# Patient Record
Sex: FEMALE | Race: WHITE | Employment: UNEMPLOYED | URBAN - METROPOLITAN AREA
[De-identification: names, ages, dates, MRNs, and addresses within clinical notes are randomized per-mention and may not be internally consistent; named-entity substitution may affect disease eponyms.]

---

## 2020-08-13 ENCOUNTER — OFFICE VISIT (OUTPATIENT)
Dept: CARDIOLOGY CLINIC | Age: 50
End: 2020-08-13
Payer: COMMERCIAL

## 2020-08-13 VITALS
HEART RATE: 60 BPM | RESPIRATION RATE: 18 BRPM | BODY MASS INDEX: 32.49 KG/M2 | DIASTOLIC BLOOD PRESSURE: 80 MMHG | WEIGHT: 207 LBS | HEIGHT: 67 IN | OXYGEN SATURATION: 98 % | SYSTOLIC BLOOD PRESSURE: 120 MMHG

## 2020-08-13 DIAGNOSIS — R06.83 SNORING: ICD-10-CM

## 2020-08-13 DIAGNOSIS — I34.1 MITRAL VALVE PROLAPSE: Primary | ICD-10-CM

## 2020-08-13 PROCEDURE — 93000 ELECTROCARDIOGRAM COMPLETE: CPT | Performed by: SPECIALIST

## 2020-08-13 PROCEDURE — 99203 OFFICE O/P NEW LOW 30 MIN: CPT | Performed by: SPECIALIST

## 2020-08-13 RX ORDER — GALCANEZUMAB 120 MG/ML
INJECTION, SOLUTION SUBCUTANEOUS
COMMUNITY
Start: 2020-07-17 | End: 2022-03-24 | Stop reason: SDUPTHER

## 2020-08-13 RX ORDER — DULOXETIN HYDROCHLORIDE 30 MG/1
CAPSULE, DELAYED RELEASE ORAL
COMMUNITY
Start: 2020-07-04 | End: 2020-08-18 | Stop reason: SDUPTHER

## 2020-08-13 RX ORDER — METOPROLOL SUCCINATE 25 MG/1
TABLET, EXTENDED RELEASE ORAL
COMMUNITY
Start: 2020-06-30 | End: 2021-03-11

## 2020-08-13 RX ORDER — MONTELUKAST SODIUM 10 MG/1
10 TABLET ORAL DAILY
COMMUNITY
End: 2021-04-27

## 2020-08-13 RX ORDER — TIZANIDINE 4 MG/1
TABLET ORAL
COMMUNITY
Start: 2020-05-24 | End: 2021-12-06 | Stop reason: SDUPTHER

## 2020-08-13 RX ORDER — OLMESARTAN MEDOXOMIL AND HYDROCHLOROTHIAZIDE 40/25 40; 25 MG/1; MG/1
TABLET ORAL
COMMUNITY
Start: 2020-07-14 | End: 2020-09-08 | Stop reason: SDUPTHER

## 2020-08-13 NOTE — PROGRESS NOTES
[facility]                                                            OFFICE NOTE        Darwin Camejo M.D.,BRODIE RUBI LINCOLN   1970  [id number]    Date/Time:  8/13/202011:35 AM        ICD-10-CM ICD-9-CM    1. Mitral valve prolapse  I34.1 424.0 AMB POC EKG ROUTINE W/ 12 LEADS, INTER & REP         SUBJECTIVE:  She does have palpitations 3-4 times a week lasting few minutes  She snores loudly  No cp or sob reported       Assessment/Plan  1. Palpitations: Her electrocardiogram today reveals a normal sinus rhythm with normal intervals. She has no documentation of complex dysrhythmias in the past.  Continue Toprol and magnesium and night. We have discussed reduction in caffeine intake. We have also discussed the presence of potentially significant sleep apnea which again could have some bearings on her palpitations and hypertension. In the meanwhile before committing her to a loop monitor she does have a hand-held device for arrhythmias have asked her to use that and send me the report. 2.  Hypertension: Seems well controlled today no changes in medications at this time. 3.  Hyperlipidemia: Closely followed by her primary care physician. 4.  Loud snoring: Likely sleep apnea. Proceed with sleep evaluation. Otherwise I will see her back in 6 months or sooner if any questions or problems arise. HPI   Very pleasant 45years old lady with a past medical history remarkable for hyperlipidemia, hypertension, depression anxiety, migraine headaches, mitral valve prolapse palpitation who has just relocated for [place] and she is establishing cardiology follow-up. She was followed by cardiologist for palpitation and shortness of breath and mitral valve prolapse. To be noted that she has undergone a calcium score in November 2019 which was 0.   She reports to me a transthoracic echocardiogram in December 2019 with normal ejection fraction mitral valve prolapse. Past surgical history: D&C, appendectomy. Social history: She drinks occasionally. She does not smoke. Family history: History of aortic valve replacement in her father. Hyperlipidemia in her family was also noted. CARDIAC STUDIES                                       EKG Results     Procedure 720 Value Units Date/Time    AMB POC EKG ROUTINE W/ 12 LEADS, INTER & REP [[id number]]     Order Status:  Sent               IMAGING      MRI Results (most recent):  No results found for this or any previous visit. CT Results (most recent):  No results found for this or any previous visit. XR Results (most recent):  No results found for this or any previous visit. No past medical history on file. No past surgical history on file. Social History     Tobacco Use    Smoking status: Never Smoker    Smokeless tobacco: Never Used   Substance Use Topics    Alcohol use: Yes    Drug use: Never     No family history on file. Allergies   Allergen Reactions    Bactrim [Sulfamethoprim] Hives         Visit Vitals  /80 (BP 1 Location: Left arm, BP Patient Position: Sitting)   Pulse 69   Resp 18   Ht 5' 7\" (1.702 m)   Wt 207 lb (93.9 kg)   SpO2 98%   BMI 32.42 kg/m²         Last 3 Recorded Weights in this Encounter    08/13/20 1123   Weight: 207 lb (93.9 kg)            Review of Systems:   Pertinent items are noted in the History of Present Illness. Visit Vitals  /80 (BP 1 Location: Left arm, BP Patient Position: Sitting)   Pulse 60   Resp 18   Ht 5' 7\" (1.702 m)   Wt 207 lb (93.9 kg)   SpO2 98%   BMI 32.42 kg/m²     General Appearance:  Well developed, well nourished,alert and oriented x 3, and individual in no acute distress. Ears/Nose/Mouth/Throat:   Hearing grossly normal.         Neck: Supple. Chest:   Lungs clear to auscultation bilaterally.    Cardiovascular:  Regular rate and rhythm, S1, S2 normal, no murmur. Abdomen:   Soft, non-tender, bowel sounds are active. Extremities: No edema bilaterally. Skin: Warm and dry. Current Outpatient Medications on File Prior to Visit   Medication Sig Dispense Refill    DULoxetine (CYMBALTA) 30 mg capsule TAKE 2 CAPSULES BY MOUTH EVERY DAY      olmesartan-hydroCHLOROthiazide (BENICAR HCT) 40-25 mg per tablet TAKE 1 TABLET BY MOUTH EVERY DAY      metoprolol succinate (TOPROL-XL) 25 mg XL tablet TAKE 1 TABLET BY MOUTH EVERY DAY      Emgality Pen 120 mg/mL injection INJECT 1ML SUBCUTANEOUSLY EVERY MONTH      tiZANidine (ZANAFLEX) 4 mg tablet TAKE 1 2 TABLETS BY MOUTH AT BEDTIME      montelukast (Singulair) 10 mg tablet Take 10 mg by mouth daily. No current facility-administered medications on file prior to visit. Jonny Chinchilla had no medications administered during this visit. Current Outpatient Medications   Medication Sig    DULoxetine (CYMBALTA) 30 mg capsule TAKE 2 CAPSULES BY MOUTH EVERY DAY    olmesartan-hydroCHLOROthiazide (BENICAR HCT) 40-25 mg per tablet TAKE 1 TABLET BY MOUTH EVERY DAY    metoprolol succinate (TOPROL-XL) 25 mg XL tablet TAKE 1 TABLET BY MOUTH EVERY DAY    Emgality Pen 120 mg/mL injection INJECT 1ML SUBCUTANEOUSLY EVERY MONTH    tiZANidine (ZANAFLEX) 4 mg tablet TAKE 1 2 TABLETS BY MOUTH AT BEDTIME    montelukast (Singulair) 10 mg tablet Take 10 mg by mouth daily. No current facility-administered medications for this visit.           No results found for: CHOL, CHOLPOCT, CHOLX, CHLST, CHOLV, HDL, HDLPOC, HDLP, LDL, LDLCPOC, LDLC, DLDLP, VLDLC, VLDL, TGLX, TRIGL, TRIGP, TGLPOCT, CHHD, CHHDX    No results found for: NA, K, CL, CO2, AGAP, GLU, BUN, CREA, BUCR, GFRAA, GFRNA, CA, GFRAA    No results found for: ALT, GGT, GGTP, AP, APIT, APX, CBIL, TBIL, TBILI    No results found for: WBC, WBCLT, HGBPOC, HGB, HGBP, HCTPOC, HCT, PHCT, RBCH, PLT, MCV, HGBEXT, HCTEXT, PLTEXT    No results found for: TSH, TSH2, TSH3, TSHP, TSHEXT      No results found for: CHOL, CHOLPOCT, CHOLX, CHLST, CHOLV, HDL, HDLP, LDL, LDLC, DLDLP, TGLX, TRIGL, TRIGP, TGLPOCT, NTGLT, CHHD, CHHDX             Please note that this dictation was completed with [employer], the computer voice recognition software. Quite often unanticipated grammatical, syntax, homophones, and other interpretative errors are inadvertently transcribed by the computer software. Please disregard these errors. Please excuse any errors that have escaped final proofreading.

## 2020-08-13 NOTE — PATIENT INSTRUCTIONS
A Referral has been sent to Dr. Meera Llanos. If you are not contacted with 10 days, please call her office at [phone]. Follow up with Dr. Anabell Mcduffie in 6 months.

## 2020-08-13 NOTE — PROGRESS NOTES
Visit Vitals  /80 (BP 1 Location: Left arm, BP Patient Position: Sitting)   Pulse 60   Resp 18   Ht 5' 7\" (1.702 m)   Wt 207 lb (93.9 kg)   SpO2 98%   BMI 32.42 kg/m²

## 2020-08-17 NOTE — PROGRESS NOTES
Jc Clarke is a 45 y.o. female who was seen by synchronous (real-time) audio-video technology on 8/18/2020 for Establish Care        Assessment & Plan:     Diagnoses and all orders for this visit:    1. Chronic migraine without aura without status migrainosus, not intractable  -     acetaminophen-caff-dihydrocod (Trezix) 320.5-30-16 mg cap; Take 1 Cap by mouth as needed for Pain (Do not exceed one tab twice weekly.) for up to 30 days. Max Daily Amount: 1 Cap.  checked  Pt will transfer migraine care to Neurology in October when she establishes with provider here in [place]. 2. Anxiety and depression  -     DULoxetine (CYMBALTA) 60 mg capsule; TAKE 1 CAPSULE BY MOUTH EVERY DAY (60mg)   Well controlled, tolerating medication, continue current regimen. 3. Other secondary hypertension  -     METABOLIC PANEL, COMPREHENSIVE; Future  -     CBC WITH AUTOMATED DIFF; Future  Follows with Cardiology, BP well controlled. 4. Mitral valve prolapse  Follows with Cardiology    5. Hypercholesteremia  -     LIPID PANEL; Future    6. Ear pain, bilateral  -     REFERRAL TO ENT-OTOLARYNGOLOGY  Pt has failed treatment for otitis media and eustachian tube dysfunction- recommend in person eval by ENT for further evaluation    7. Screening for breast cancer  -     LORRAINE MAMMO BI SCREENING INCL CAD; Future    8. Screen for colon cancer  -     COLOGUARD TEST (FECAL DNA COLORECTAL CANCER SCREENING)      Follow-up and Dispositions    · Return in about 3 months (around 11/18/2020), or if symptoms worsen or fail to improve, for HTN, migraines, XOL, depression/anxiety. Subjective:       Pt here to est care. Prev PCP Dr. Jessica Polo, [facility].  Moved here in March from [place].        Pt has hx of:  HTN- told secondary HTN by Cardiology, olmesartan/hctz 40-25, metoprolol 25mg, home bp 120/80   XOL- took statin in past (last fall) and made her too nauseous  Depression, anxiety- Cymbalta 60mg  Migraines-emgality sq monthly injection, zomig prn, Trezix, previously following with Neurologist in [place], gets 10-12 migraines a month. Had neck surgery in 2010 for neck which is when migraines worsend, told has bone spurs and stenosis, saw spine specialist in [place] and told not great surgical candidate. Tracks migraines with an fab. Has appt with Neurologist in October. Takes Trezix 2/week, zomig 2/week. Mitral valve prolapse- Seen by Dr. Martin Butterfield on 8-13-20, EKG NSR and BP well controlled at visit, pt advised continue toprol and decrease caffeine, and also referred for sleep study for snoring- states had trouble in past getting sleep study approved by insurance- was $2400. Has been two UC twice since moved here in March for ear problems- once told ear infection and given abx, 2nd time told eustachian tube dysfunction and prescribed flonase and afrin. Was better initially but now having ear stopped up feeling, right ear painful, feels like fluid in ears, will have drainage and crust in ears, ears popping. Has been using flonase daily and singulair. HM  Shingles- never had   PAP- Dr. Yamila Wilson office in last few years, no hx of abnormal PAP  Mammogram- last one in [place] approx 2 years ago  FOBT- never had colonoscopy       Lifestyle  Diet: Eats Mediterranean diet. Eats some fast foods, rare fried foods. Drinks sprite occasionally, cran-grape juice, OJ. Uses to drink a lot of sweet tea. Exercise: Was going to gym pre-covid, just opened back up. [place]. ETOH: 2-3 glasses/week  Nicotine: never      Denies chest pain, chest pressure, or palpitations. Denies SOB, orthopnea, or PND. Denies lower extremity swelling. Has hx of vertigo, denies blurred vision. Denies N/V/D, constipation, or abd pain. Denies BRBPR, melena, or blood in urine.      3 most recent PHQ Screens 8/18/2020   Little interest or pleasure in doing things Not at all   Feeling down, depressed, irritable, or hopeless Not at all   Total Score PHQ 2 0 Prior to Admission medications    Medication Sig Start Date End Date Taking? Authorizing Provider   magnesium 250 mg tab Take 500 mg by mouth. Yes Provider, Historical   ZOLMitriptan (Zomig ZMT) 5 mg disintegrating tablet Take 5 mg by mouth as needed for Migraine. Yes Provider, Historical   DULoxetine (CYMBALTA) 60 mg capsule TAKE 1 CAPSULE BY MOUTH EVERY DAY (60mg) 8/18/20  Yes Micheal Cost, NP   acetaminophen-caff-dihydrocod (Trezix) 320.5-30-16 mg cap Take 1 Cap by mouth as needed for Pain (Do not exceed one tab twice weekly.) for up to 30 days. Max Daily Amount: 1 Cap. 8/18/20 9/17/20 Yes Micheal Melgoza, NP   olmesartan-hydroCHLOROthiazide (BENICAR HCT) 40-25 mg per tablet TAKE 1 TABLET BY MOUTH EVERY DAY 7/14/20  Yes Provider, Historical   metoprolol succinate (TOPROL-XL) 25 mg XL tablet TAKE 1 TABLET BY MOUTH EVERY DAY 6/30/20  Yes Provider, Historical   Emgality Pen 120 mg/mL injection INJECT 1ML SUBCUTANEOUSLY EVERY MONTH 7/17/20  Yes Provider, Historical   tiZANidine (ZANAFLEX) 4 mg tablet TAKE 1 2 TABLETS BY MOUTH AT BEDTIME 5/24/20  Yes Provider, Historical   montelukast (Singulair) 10 mg tablet Take 10 mg by mouth daily. Yes Provider, Historical   DULoxetine (CYMBALTA) 30 mg capsule TAKE 2 CAPSULES BY MOUTH EVERY DAY 7/4/20 8/18/20  Provider, Historical     Patient Active Problem List   Diagnosis Code    Migraines G43.909    HTN (hypertension) I10    Anxiety and depression F41.9, F32.9    Mitral valve prolapse I34.1    Hypercholesteremia E78.00     Patient Active Problem List    Diagnosis Date Noted    Mitral valve prolapse 08/18/2020    Hypercholesteremia 08/18/2020    Migraines     HTN (hypertension)     Anxiety and depression      Current Outpatient Medications   Medication Sig Dispense Refill    magnesium 250 mg tab Take 500 mg by mouth.  ZOLMitriptan (Zomig ZMT) 5 mg disintegrating tablet Take 5 mg by mouth as needed for Migraine.       DULoxetine (CYMBALTA) 60 mg capsule TAKE 1 CAPSULE BY MOUTH EVERY DAY (60mg) 90 Cap 2    acetaminophen-caff-dihydrocod (Trezix) 320.5-30-16 mg cap Take 1 Cap by mouth as needed for Pain (Do not exceed one tab twice weekly.) for up to 30 days. Max Daily Amount: 1 Cap. 30 Cap 0    olmesartan-hydroCHLOROthiazide (BENICAR HCT) 40-25 mg per tablet TAKE 1 TABLET BY MOUTH EVERY DAY      metoprolol succinate (TOPROL-XL) 25 mg XL tablet TAKE 1 TABLET BY MOUTH EVERY DAY      Emgality Pen 120 mg/mL injection INJECT 1ML SUBCUTANEOUSLY EVERY MONTH      tiZANidine (ZANAFLEX) 4 mg tablet TAKE 1 2 TABLETS BY MOUTH AT BEDTIME      montelukast (Singulair) 10 mg tablet Take 10 mg by mouth daily. Allergies   Allergen Reactions    Latex Hives, Itching and Other (comments)     Swelling at contact areas    Bactrim [Sulfamethoprim] Hives     Past Medical History:   Diagnosis Date    Anxiety and depression     HTN (hypertension)     Migraines      Past Surgical History:   Procedure Laterality Date    HX  SECTION      HX DILATION AND CURETTAGE      HX ORTHOPAEDIC  2010    titanium conchita, cervical spine     Family History   Problem Relation Age of Onset    Stroke Mother     Cancer Mother         brain tumor    Heart Disease Father     Prostate Cancer Father     Hypertension Sister     Heart Attack Sister     Alzheimer Maternal Grandmother     Heart Attack Maternal Grandmother     Hypertension Maternal Grandmother     Lung Cancer Paternal Grandmother     Other Paternal Grandfather         cirrhosis      Social History     Tobacco Use    Smoking status: Never Smoker    Smokeless tobacco: Never Used   Substance Use Topics    Alcohol use: Yes     Comment: 2-3 glasses of wine/month       ROS  See hpi    Objective:   No flowsheet data found.      [INSTRUCTIONS:  \"[x]\" Indicates a positive item  \"[]\" Indicates a negative item  -- DELETE ALL ITEMS NOT EXAMINED]    Constitutional: [x] Appears well-developed and well-nourished [x] No apparent distress      [] Abnormal -     Mental status: [x] Alert and awake  [x] Oriented to person/place/time [x] Able to follow commands    [] Abnormal -     Eyes:   EOM    [x]  Normal    [] Abnormal -   Sclera  [x]  Normal    [] Abnormal -          Discharge [x]  None visible   [] Abnormal -     HENT: [x] Normocephalic, atraumatic  [] Abnormal -   [x] Mouth/Throat: Mucous membranes are moist    External Ears [x] Normal  [] Abnormal -    Neck: [x] No visualized mass [] Abnormal -     Pulmonary/Chest: [x] Respiratory effort normal   [x] No visualized signs of difficulty breathing or respiratory distress        [] Abnormal -      Musculoskeletal:   [x] Normal gait with no signs of ataxia         [x] Normal range of motion of neck        [] Abnormal -     Neurological:        [x] No Facial Asymmetry (Cranial nerve 7 motor function) (limited exam due to video visit)          [x] No gaze palsy        [] Abnormal -          Skin:        [x] No significant exanthematous lesions or discoloration noted on facial skin         [] Abnormal -            Psychiatric:       [x] Normal Affect [] Abnormal -        [x] No Hallucinations    Other pertinent observable physical exam findings:-        We discussed the expected course, resolution and complications of the diagnosis(es) in detail. Medication risks, benefits, costs, interactions, and alternatives were discussed as indicated. I advised her to contact the office if her condition worsens, changes or fails to improve as anticipated. She expressed understanding with the diagnosis(es) and plan. Paul Osman, who was evaluated through a patient-initiated, synchronous (real-time) audio-video encounter, and/or her healthcare decision maker, is aware that it is a billable service, with coverage as determined by her insurance carrier. She provided verbal consent to proceed: Yes, and patient identification was verified.  It was conducted pursuant to the emergency declaration under the [facility], [address] [place] [address] authority and the [employer] and [employer] Act. A caregiver was present when appropriate. Ability to conduct physical exam was limited. I was at home. The patient was at home.       Francisco Diamond NP

## 2020-08-18 ENCOUNTER — VIRTUAL VISIT (OUTPATIENT)
Dept: INTERNAL MEDICINE CLINIC | Age: 50
End: 2020-08-18
Payer: COMMERCIAL

## 2020-08-18 DIAGNOSIS — Z12.39 SCREENING FOR BREAST CANCER: ICD-10-CM

## 2020-08-18 DIAGNOSIS — G43.709 CHRONIC MIGRAINE WITHOUT AURA WITHOUT STATUS MIGRAINOSUS, NOT INTRACTABLE: Primary | ICD-10-CM

## 2020-08-18 DIAGNOSIS — E78.00 HYPERCHOLESTEREMIA: ICD-10-CM

## 2020-08-18 DIAGNOSIS — F41.9 ANXIETY AND DEPRESSION: ICD-10-CM

## 2020-08-18 DIAGNOSIS — H92.03 EAR PAIN, BILATERAL: ICD-10-CM

## 2020-08-18 DIAGNOSIS — Z12.11 SCREEN FOR COLON CANCER: ICD-10-CM

## 2020-08-18 DIAGNOSIS — I15.8 OTHER SECONDARY HYPERTENSION: ICD-10-CM

## 2020-08-18 DIAGNOSIS — F32.A ANXIETY AND DEPRESSION: ICD-10-CM

## 2020-08-18 DIAGNOSIS — I34.1 MITRAL VALVE PROLAPSE: ICD-10-CM

## 2020-08-18 PROCEDURE — 99204 OFFICE O/P NEW MOD 45 MIN: CPT | Performed by: NURSE PRACTITIONER

## 2020-08-18 RX ORDER — ZINC GLUCONATE 10 MG
500 LOZENGE ORAL
COMMUNITY

## 2020-08-18 RX ORDER — ACETAMINOPHEN, CAFFEINE, DIHYDROCODEINE BITARTRATE 320.5; 30; 16 MG/1; MG/1; MG/1
1 CAPSULE ORAL AS NEEDED
Qty: 30 CAP | Refills: 0 | Status: SHIPPED | OUTPATIENT
Start: 2020-08-18 | End: 2020-09-30

## 2020-08-18 RX ORDER — DULOXETIN HYDROCHLORIDE 60 MG/1
CAPSULE, DELAYED RELEASE ORAL
Qty: 90 CAP | Refills: 2 | Status: SHIPPED | OUTPATIENT
Start: 2020-08-18 | End: 2020-11-04 | Stop reason: SDUPTHER

## 2020-08-18 RX ORDER — ZOLMITRIPTAN 5 MG/1
5 TABLET, ORALLY DISINTEGRATING ORAL AS NEEDED
COMMUNITY
End: 2021-04-27

## 2020-08-18 NOTE — PATIENT INSTRUCTIONS
High Blood Pressure: Care Instructions Overview It's normal for blood pressure to go up and down throughout the day. But if it stays up, you have high blood pressure. Another name for high blood pressure is hypertension. Despite what a lot of people think, high blood pressure usually doesn't cause headaches or make you feel dizzy or lightheaded. It usually has no symptoms. But it does increase your risk of stroke, heart attack, and other problems. You and your doctor will talk about your risks of these problems based on your blood pressure. Your doctor will give you a goal for your blood pressure. Your goal will be based on your health and your age. Lifestyle changes, such as eating healthy and being active, are always important to help lower blood pressure. You might also take medicine to reach your blood pressure goal. 
Follow-up care is a key part of your treatment and safety. Be sure to make and go to all appointments, and call your doctor if you are having problems. It's also a good idea to know your test results and keep a list of the medicines you take. How can you care for yourself at home? Medical treatment · If you stop taking your medicine, your blood pressure will go back up. You may take one or more types of medicine to lower your blood pressure. Be safe with medicines. Take your medicine exactly as prescribed. Call your doctor if you think you are having a problem with your medicine. · Talk to your doctor before you start taking aspirin every day. Aspirin can help certain people lower their risk of a heart attack or stroke. But taking aspirin isn't right for everyone, because it can cause serious bleeding. · See your doctor regularly. You may need to see the doctor more often at first or until your blood pressure comes down. · If you are taking blood pressure medicine, talk to your doctor before you take decongestants or anti-inflammatory medicine, such as ibuprofen. Some of these medicines can raise blood pressure. · Learn how to check your blood pressure at home. Lifestyle changes · Stay at a healthy weight. This is especially important if you put on weight around the waist. Losing even 10 pounds can help you lower your blood pressure. · If your doctor recommends it, get more exercise. Walking is a good choice. Bit by bit, increase the amount you walk every day. Try for at least 30 minutes on most days of the week. You also may want to swim, bike, or do other activities. · Avoid or limit alcohol. Talk to your doctor about whether you can drink any alcohol. · Try to limit how much sodium you eat to less than 2,300 milligrams (mg) a day. Your doctor may ask you to try to eat less than 1,500 mg a day. · Eat plenty of fruits (such as bananas and oranges), vegetables, legumes, whole grains, and low-fat dairy products. · Lower the amount of saturated fat in your diet. Saturated fat is found in animal products such as milk, cheese, and meat. Limiting these foods may help you lose weight and also lower your risk for heart disease. · Do not smoke. Smoking increases your risk for heart attack and stroke. If you need help quitting, talk to your doctor about stop-smoking programs and medicines. These can increase your chances of quitting for good. When should you call for help? Call  911 anytime you think you may need emergency care. This may mean having symptoms that suggest that your blood pressure is causing a serious heart or blood vessel problem. Your blood pressure may be over 180/120. For example, call 911 if: 
· You have symptoms of a heart attack. These may include: 
? Chest pain or pressure, or a strange feeling in the chest. 
? Sweating. ? Shortness of breath. ? Nausea or vomiting. ? Pain, pressure, or a strange feeling in the back, neck, jaw, or upper belly or in one or both shoulders or arms. ? Lightheadedness or sudden weakness. ? A fast or irregular heartbeat. · You have symptoms of a stroke. These may include: 
? Sudden numbness, tingling, weakness, or loss of movement in your face, arm, or leg, especially on only one side of your body. ? Sudden vision changes. ? Sudden trouble speaking. ? Sudden confusion or trouble understanding simple statements. ? Sudden problems with walking or balance. ? A sudden, severe headache that is different from past headaches. · You have severe back or belly pain. Do not wait until your blood pressure comes down on its own. Get help right away. Call your doctor now or seek immediate care if: 
· Your blood pressure is much higher than normal (such as 180/120 or higher), but you don't have symptoms. · You think high blood pressure is causing symptoms, such as: 
? Severe headache. 
? Blurry vision. Watch closely for changes in your health, and be sure to contact your doctor if: 
· Your blood pressure measures higher than your doctor recommends at least 2 times. That means the top number is higher or the bottom number is higher, or both. · You think you may be having side effects from your blood pressure medicine. Where can you learn more? Go to http://bkaari-brendan.info/ Enter Z048 in the search box to learn more about \"High Blood Pressure: Care Instructions. \" Current as of: December 16, 2019               Content Version: 12.5 © 7861-6600 [employer]. Care instructions adapted under license by [employer] (which disclaims liability or warranty for this information). If you have questions about a medical condition or this instruction, always ask your healthcare professional. [place] [address] any warranty or liability for your use of this information.

## 2020-08-18 NOTE — PROGRESS NOTES
Appointment made for patient with Dr Margarita Pacheco for right ear pain/drainage on 8/20/2020 at 9:20 am Office location [id number] [facility].

## 2020-08-18 NOTE — PROGRESS NOTES
Rusty Barrera  Identified pt with two pt identifiers(name and ). Chief Complaint   Patient presents with   24 [facility] Establish Care     Has chronic neck pain from bone spur-prior surgery pain rated at a 5/10    1. Have you been to the ER, urgent care clinic since your last visit? Hospitalized since your last visit? NO    2. Have you seen or consulted any other health care providers outside of the [address] [place] [address] since your last visit? Include any pap smears or colon screening. Cardiologist-Dr Karina Ruth  Sees neuro in Oct for migraines -    Today's provider has been notified of reason for visit, vitals and flowsheets obtained on patients. Reviewed record In preparation for visit, huddled with provider and have obtained necessary documentation      Health Maintenance Due   Topic    DTaP/Tdap/Td series (1 - Tdap)    PAP AKA CERVICAL CYTOLOGY     Lipid Screen     Shingrix Vaccine Age 50> (1 of 2)    Breast Cancer Screen Mammogram     FOBT Q1Y Age 54-65     Influenza Age 5 to Adult        Wt Readings from Last 3 Encounters:   20 207 lb (93.9 kg)     Temp Readings from Last 3 Encounters:   No data found for Temp     BP Readings from Last 3 Encounters:   20 120/80     Pulse Readings from Last 3 Encounters:   20 60     There were no vitals filed for this visit. Learning Assessment:  :     No flowsheet data found. Depression Screening:  :     3 most recent PHQ Screens 2020   Little interest or pleasure in doing things Not at all   Feeling down, depressed, irritable, or hopeless Not at all   Total Score PHQ 2 0       Fall Risk Assessment:  :     Fall Risk Assessment, last 12 mths 2020   Able to walk? Yes   Fall in past 12 months? No       Abuse Screening:  :     Abuse Screening Questionnaire 2020   Do you ever feel afraid of your partner? N   Are you in a relationship with someone who physically or mentally threatens you? N   Is it safe for you to go home?  Y       ADL Screening:  :     ADL Assessment 8/18/2020   Feeding yourself No Help Needed   Getting from bed to chair No Help Needed   Getting dressed No Help Needed   Bathing or showering No Help Needed   Walk across the room (includes cane/walker) No Help Needed   Using the telphone No Help Needed   Taking your medications No Help Needed   Preparing meals No Help Needed   Managing money (expenses/bills) No Help Needed   Moderately strenuous housework (laundry) No Help Needed   Shopping for personal items (toiletries/medicines) No Help Needed   Shopping for groceries No Help Needed   Driving No Help Needed   Climbing a flight of stairs No Help Needed   Getting to places beyond walking distances No Help Needed                 Medication reconciliation up to date and corrected with patient at this time.

## 2020-09-08 RX ORDER — OLMESARTAN MEDOXOMIL AND HYDROCHLOROTHIAZIDE 40/25 40; 25 MG/1; MG/1
TABLET ORAL
Qty: 90 TAB | Refills: 1 | Status: SHIPPED | OUTPATIENT
Start: 2020-09-08 | End: 2020-09-28

## 2020-09-08 NOTE — TELEPHONE ENCOUNTER
Requested Prescriptions     Signed Prescriptions Disp Refills    olmesartan-hydroCHLOROthiazide (BENICAR HCT) 40-25 mg per tablet 90 Tab 1     Sig: TAKE 1 TABLET BY MOUTH EVERY DAY     Authorizing Provider: Oliverio Chavez     Ordering User: Caitlyn Triana     Verbal order per Dr. Heavenly Siegel.     Future Appointments   Date Time Provider Namrata Singh   11/19/2020  8:30 AM GIULIANO Penny AMB   2/16/2021 11:00 AM MD MIGUEL ANGEL Franz AMB

## 2020-09-23 ENCOUNTER — TELEPHONE (OUTPATIENT)
Dept: INTERNAL MEDICINE CLINIC | Age: 50
End: 2020-09-23

## 2020-09-23 DIAGNOSIS — R19.5 POSITIVE COLORECTAL CANCER SCREENING USING COLOGUARD TEST: Primary | ICD-10-CM

## 2020-09-23 NOTE — TELEPHONE ENCOUNTER
I left a message to call back. If I do not receive a call by tomorrow, I will send a letter with the results and referral information.

## 2020-09-23 NOTE — LETTER
9/24/2020 2:58 PM 
 
Ms. Pola Maldonado [address] [facility] [address] We received a positive cologuard result via fax. I would like for you to see a gastroenterologists for further evaluation. If you have any questions, please give me a call at [phone]. Sincerely, Luma Lewis NP

## 2020-09-24 NOTE — TELEPHONE ENCOUNTER
I called the patient and verified them by name and date of birth. I informed the patient on the message from Rima Mitchell NP. She stated understanding and had no further questions. She would like for me to send the referral information via [employer].

## 2020-09-24 NOTE — TELEPHONE ENCOUNTER
The patient called back and verified them by name and date of birth. I informed the patient on the results from Colog[facility]ard.  The patient stated they started their period later that day and wanted to know if that could be a result of the positive test.

## 2020-09-28 ENCOUNTER — TELEPHONE (OUTPATIENT)
Dept: CARDIOLOGY CLINIC | Age: 50
End: 2020-09-28

## 2020-09-28 RX ORDER — OLMESARTAN MEDOXOMIL 40 MG/1
TABLET ORAL DAILY
COMMUNITY
End: 2021-02-08 | Stop reason: SDUPTHER

## 2020-09-28 RX ORDER — HYDROCHLOROTHIAZIDE 25 MG/1
25 TABLET ORAL DAILY
COMMUNITY
End: 2021-02-08 | Stop reason: SDUPTHER

## 2020-09-28 NOTE — TELEPHONE ENCOUNTER
Received notification from pharmacy that benicar HCT is backordered. Spoke to pharmacist, Lawanda Cyr. To provide separate tablets, olmesartan 40 mg daily and HCTZ 25 mg daily. Medication profile updated.

## 2020-09-29 DIAGNOSIS — G43.709 CHRONIC MIGRAINE WITHOUT AURA WITHOUT STATUS MIGRAINOSUS, NOT INTRACTABLE: ICD-10-CM

## 2020-09-30 RX ORDER — ACETAMINOPHEN, CAFFEINE, DIHYDROCODEINE BITARTRATE 320.5; 30; 16 MG/1; MG/1; MG/1
CAPSULE ORAL
Qty: 30 CAP | Refills: 0 | Status: SHIPPED | OUTPATIENT
Start: 2020-09-30 | End: 2020-10-30

## 2020-11-04 DIAGNOSIS — G43.709 CHRONIC MIGRAINE WITHOUT AURA WITHOUT STATUS MIGRAINOSUS, NOT INTRACTABLE: ICD-10-CM

## 2020-11-05 RX ORDER — ACETAMINOPHEN, CAFFEINE, DIHYDROCODEINE BITARTRATE 320.5; 30; 16 MG/1; MG/1; MG/1
CAPSULE ORAL
Qty: 30 CAP | Refills: 0 | OUTPATIENT
Start: 2020-11-05

## 2020-11-05 NOTE — TELEPHONE ENCOUNTER
Verified patients name and date of birth. Patient given information per Deysi Olivarez NP. Patient stated understanding.

## 2020-11-05 NOTE — TELEPHONE ENCOUNTER
Verified patients name and date of birth. Advised her per BAILEY Hui note. Patient states she has >15 migraines monthly and has seen a neurologist at [facility]. Neurologist is Dr Mimi Lazar. Advised he will likely need to fill this medication now. Please advise.

## 2021-02-08 RX ORDER — OLMESARTAN MEDOXOMIL AND HYDROCHLOROTHIAZIDE 40/25 40; 25 MG/1; MG/1
TABLET ORAL
Qty: 90 TAB | Refills: 1 | Status: SHIPPED | OUTPATIENT
Start: 2021-02-08 | End: 2021-08-16

## 2021-02-08 NOTE — TELEPHONE ENCOUNTER
Cardiologist: Dr. Srdihar Kimball    Last appt: 8/13/2020  Future Appointments   Date Time Provider Namrata Kingi   2/16/2021 11:00 AM MD MIGUEL ANGEL Perez BS AMB       Requested Prescriptions     Signed Prescriptions Disp Refills    olmesartan-hydroCHLOROthiazide (BENICAR HCT) 40-25 mg per tablet 90 Tab 1     Sig: TAKE 1 TABLET BY MOUTH EVERY DAY     Authorizing Provider: Kassandra Higgins     Ordering User: TUNDE HOOVER         Refills VO per Dr. Sridhar Kimball.     Changed back to combination tab

## 2021-03-11 ENCOUNTER — TELEPHONE (OUTPATIENT)
Dept: CARDIOLOGY CLINIC | Age: 51
End: 2021-03-11

## 2021-03-11 ENCOUNTER — OFFICE VISIT (OUTPATIENT)
Dept: CARDIOLOGY CLINIC | Age: 51
End: 2021-03-11
Payer: COMMERCIAL

## 2021-03-11 VITALS
RESPIRATION RATE: 18 BRPM | SYSTOLIC BLOOD PRESSURE: 130 MMHG | HEART RATE: 97 BPM | DIASTOLIC BLOOD PRESSURE: 90 MMHG | WEIGHT: 212 LBS | BODY MASS INDEX: 33.27 KG/M2 | HEIGHT: 67 IN | OXYGEN SATURATION: 97 %

## 2021-03-11 DIAGNOSIS — I34.1 MITRAL VALVE PROLAPSE: Primary | ICD-10-CM

## 2021-03-11 DIAGNOSIS — R00.2 PALPITATIONS: ICD-10-CM

## 2021-03-11 DIAGNOSIS — I15.8 OTHER SECONDARY HYPERTENSION: ICD-10-CM

## 2021-03-11 PROCEDURE — 99214 OFFICE O/P EST MOD 30 MIN: CPT | Performed by: SPECIALIST

## 2021-03-11 PROCEDURE — 93000 ELECTROCARDIOGRAM COMPLETE: CPT | Performed by: SPECIALIST

## 2021-03-11 RX ORDER — RIMEGEPANT SULFATE 75 MG/75MG
75 TABLET, ORALLY DISINTEGRATING ORAL AS NEEDED
COMMUNITY
Start: 2021-01-30 | End: 2021-10-01 | Stop reason: SDUPTHER

## 2021-03-11 RX ORDER — METOPROLOL SUCCINATE 50 MG/1
50 TABLET, EXTENDED RELEASE ORAL DAILY
Qty: 90 TAB | Refills: 1 | Status: SHIPPED | OUTPATIENT
Start: 2021-03-11 | End: 2021-04-27 | Stop reason: SDUPTHER

## 2021-03-11 NOTE — TELEPHONE ENCOUNTER
Enrolled with [employer] - Ordered and being shipped to patient's home address on file. ETA within 5-7 business days.

## 2021-03-11 NOTE — PROGRESS NOTES
.  Visit Vitals  BP (!) 130/90 (BP 1 Location: Left upper arm, BP Patient Position: Sitting, BP Cuff Size: Adult)   Pulse 97   Resp 18   Ht 5' 7\" (1.702 m)   Wt 212 lb (96.2 kg)   SpO2 97%   BMI 33.20 kg/m²

## 2021-03-11 NOTE — PROGRESS NOTES
[address] [facility]                                                            OFFICE NOTE        Luis Fernando Mcgraw M.D.,BRODIE RUBI LINCOLN   1970  [id number]    Date/Time:  3/11/135604:18 AM        ICD-10-CM ICD-9-CM    1. Mitral valve prolapse  I34.1 424.0 AMB POC EKG ROUTINE W/ 12 LEADS, INTER & REP         SUBJECTIVE:  She has noticed palpitations increased HR 4-5 times a week   no cp or sob reported  Still snoring loudly       Assessment/Plan  1. Palpitations: She continues to have palpitations about both her Apple Watch and other hand-held device not picking up any kind of rhythm issues thus far but she tells me that the heart rate for no reason will go up to 120 bpm.    Discussed options. Proceed with 2 weeks loop monitor. Obtain BMP magnesium and TSH. Proceed with sleep study. 2.  Hypertension: Slightly hypertensive today also on account of palpitation I will increase the Toprol to 50 mg daily. She is aware of potential side effects. 3.  Hyperlipidemia: Closely followed by primary care physician. 4.  Loud snoring: Likely sleep apnea. Proceed with sleep evaluation. Otherwise I will see her back in approximately 3 weeks. HPI   Very pleasant 45years old lady with a past medical history remarkable for hyperlipidemia, hypertension, depression anxiety, migraine headaches, mitral valve prolapse palpitation who has just relocated for [place] and she is establishing cardiology follow-up.     She was followed by cardiologist for palpitation and shortness of breath and mitral valve prolapse. To be noted that she has undergone a calcium score in November 2019 which was 0. She reports to me a transthoracic echocardiogram in December 2019 with normal ejection fraction mitral valve prolapse.     Past surgical history: D&C, appendectomy.     Social history: She drinks occasionally. She does not smoke.     Family history: History of aortic valve replacement in her father. Hyperlipidemia in her family was also noted. CARDIAC STUDIES                                       EKG Results     Procedure 720 Value Units Date/Time    AMB POC EKG ROUTINE W/ 12 LEADS, INTER & REP [[id number]] Resulted: 21 1059    Order Status: Completed Updated: 21 1101              IMAGING      MRI Results (most recent):  No results found for this or any previous visit. CT Results (most recent):  Results from Abstract encounter on 20   CT CARDIAC SCORE LTD       XR Results (most recent):  No results found for this or any previous visit.         Past Medical History:   Diagnosis Date    Anxiety and depression     HTN (hypertension)     Migraines      Past Surgical History:   Procedure Laterality Date    HX  SECTION      HX DILATION AND CURETTAGE      HX ORTHOPAEDIC  2010    titanium conchita, cervical spine     Social History     Tobacco Use    Smoking status: Never Smoker    Smokeless tobacco: Never Used   Substance Use Topics    Alcohol use: Yes     Comment: 2-3 glasses of wine/month    Drug use: Never     Family History   Problem Relation Age of Onset   Jenny Collins Stroke Mother     Cancer Mother         brain tumor    Heart Disease Father     Prostate Cancer Father     Hypertension Sister     Heart Attack Sister     Alzheimer Maternal Grandmother     Heart Attack Maternal Grandmother     Hypertension Maternal Grandmother     Lung Cancer Paternal Grandmother     Other Paternal Grandfather         cirrhosis      Allergies   Allergen Reactions    Latex Hives, Itching and Other (comments)     Swelling at contact areas    Pb Swelling    Bactrim [Sulfamethoprim] Hives         Visit Vitals  BP (!) 130/90 (BP 1 Location: Left upper arm, BP Patient Position: Sitting, BP Cuff Size: Adult)   Pulse 97   Resp 18   Ht 5' 7\" (1.702 m)   Wt 212 lb (96.2 kg)   SpO2 97%   BMI 33.20 kg/m²         Last 3 Recorded Weights in this Encounter    03/11/21 1053   Weight: 212 lb (96.2 kg)            Review of Systems:   Pertinent items are noted in the History of Present Illness. Visit Vitals  BP (!) 130/90 (BP 1 Location: Left upper arm, BP Patient Position: Sitting, BP Cuff Size: Adult)   Pulse 97   Resp 18   Ht 5' 7\" (1.702 m)   Wt 212 lb (96.2 kg)   SpO2 97%   BMI 33.20 kg/m²     General Appearance:  Well developed, well nourished,alert and oriented x 3, and individual in no acute distress. Ears/Nose/Mouth/Throat:   Hearing grossly normal.         Neck: Supple. Chest:   Lungs clear to auscultation bilaterally. Cardiovascular:  Regular rate and rhythm, S1, S2 normal, no murmur. Abdomen:   Soft, non-tender, bowel sounds are active. Extremities: No edema bilaterally. Skin: Warm and dry. Current Outpatient Medications on File Prior to Visit   Medication Sig Dispense Refill    Nurtec ODT 75 mg disintegrating tablet Take 75 mg by mouth daily.  olmesartan-hydroCHLOROthiazide (BENICAR HCT) 40-25 mg per tablet TAKE 1 TABLET BY MOUTH EVERY DAY 90 Tab 1    DULoxetine (CYMBALTA) 60 mg capsule TAKE 1 CAPSULE BY MOUTH EVERY DAY (60mg) 90 Cap 2    magnesium 250 mg tab Take 500 mg by mouth.  metoprolol succinate (TOPROL-XL) 25 mg XL tablet TAKE 1 TABLET BY MOUTH EVERY DAY      Emgality Pen 120 mg/mL injection INJECT 1ML SUBCUTANEOUSLY EVERY MONTH      tiZANidine (ZANAFLEX) 4 mg tablet TAKE 1 2 TABLETS BY MOUTH AT BEDTIME      ZOLMitriptan (Zomig ZMT) 5 mg disintegrating tablet Take 5 mg by mouth as needed for Migraine.  montelukast (Singulair) 10 mg tablet Take 10 mg by mouth daily. No current facility-administered medications on file prior to visit. Patrick Tejeda had no medications administered during this visit. Current Outpatient Medications   Medication Sig    Nurtec ODT 75 mg disintegrating tablet Take 75 mg by mouth daily.     olmesartan-hydroCHLOROthiazide (BENICAR HCT) 40-25 mg per tablet TAKE 1 TABLET BY MOUTH EVERY DAY    DULoxetine (CYMBALTA) 60 mg capsule TAKE 1 CAPSULE BY MOUTH EVERY DAY (60mg)    magnesium 250 mg tab Take 500 mg by mouth.  metoprolol succinate (TOPROL-XL) 25 mg XL tablet TAKE 1 TABLET BY MOUTH EVERY DAY    Emgality Pen 120 mg/mL injection INJECT 1ML SUBCUTANEOUSLY EVERY MONTH    tiZANidine (ZANAFLEX) 4 mg tablet TAKE 1 2 TABLETS BY MOUTH AT BEDTIME    ZOLMitriptan (Zomig ZMT) 5 mg disintegrating tablet Take 5 mg by mouth as needed for Migraine.  montelukast (Singulair) 10 mg tablet Take 10 mg by mouth daily. No current facility-administered medications for this visit. No results found for: CHOL, CHOLPOCT, CHOLX, CHLST, CHOLV, HDL, HDLPOC, HDLP, LDL, LDLCPOC, LDLC, DLDLP, VLDLC, VLDL, TGLX, TRIGL, TRIGP, TGLPOCT, CHHD, CHHDX    No results found for: NA, K, CL, CO2, AGAP, GLU, BUN, CREA, BUCR, GFRAA, GFRNA, CA, GFRAA    No results found for: ALT, GGT, GGTP, AP, APIT, APX, CBIL, TBIL, TBILI    No results found for: WBC, WBCLT, HGBPOC, HGB, HGBP, HCTPOC, HCT, PHCT, RBCH, PLT, MCV, HGBEXT, HCTEXT, PLTEXT    No results found for: TSH, TSH2, TSH3, TSHP, TSHEXT      No results found for: CHOL, CHOLPOCT, CHOLX, CHLST, CHOLV, HDL, HDLP, LDL, LDLC, DLDLP, TGLX, TRIGL, TRIGP, TGLPOCT, NTGLT, CHHD, CHHDX             Please note that this dictation was completed with [employer], the [employer] voice recognition software. Quite often unanticipated grammatical, syntax, homophones, and other interpretative errors are inadvertently transcribed by the computer software. Please disregard these errors. Please excuse any errors that have escaped final proofreading.

## 2021-03-11 NOTE — PATIENT INSTRUCTIONS
Please increase your Toprol to 50mg daily. A new prescription with 50mg pills will be sent to your pharmacy. An order has been placed for you for a consult for a sleep study . Call to schedule this through [facility] at [phone]. A monitor has been ordered for you. This will be mailed to the address given to us. Please read over the instructions given to you about [employer] monitors. If you have any insurance questions regarding coverage and out of pocket cost please contact the number below. If you have any billing questions regarding deductible or responsibility call ([phone]) If you need additional supplies or issue with your monitor please call ([phone]) Please follow up with Dr. Easton Plasencia in about 3-4 weeks have lab work completed at least 1 week prior.

## 2021-03-11 NOTE — TELEPHONE ENCOUNTER
----- Message from Coral Reyes RN sent at 3/11/2021 12:12 PM EST -----  Please order 2 week loop for palpitations thanks!

## 2021-03-22 ENCOUNTER — TELEPHONE (OUTPATIENT)
Dept: INTERNAL MEDICINE CLINIC | Age: 51
End: 2021-03-22

## 2021-03-22 NOTE — TELEPHONE ENCOUNTER
----- Message from Diego Horne sent at 3/19/2021 10:41 PM EDT -----  Regarding: Test Results Question  Contact: [phone]  Gal Ab wants me to have my thyroid checked.   He said that he wanted you to order the test?

## 2021-03-22 NOTE — TELEPHONE ENCOUNTER
I sent the patient a [employer] message informing her on the information per NP [facility].

## 2021-03-24 ENCOUNTER — PATIENT MESSAGE (OUTPATIENT)
Dept: CARDIOLOGY CLINIC | Age: 51
End: 2021-03-24

## 2021-03-25 NOTE — TELEPHONE ENCOUNTER
Randee Sam  You 14 minutes ago (8:35 AM)     Yes, her monitor has been extended a week until 4/8 to allow time for pt to get new electrodes     Message text       You  Gaurav Edwards; Ana Wolff; Randee Sam 29 minutes ago (8:21 AM)     Can you guys extend her monitor for a week?  She has had to take it off due to skin irritations and is waiting for new stickers    Routing comment

## 2021-03-25 NOTE — TELEPHONE ENCOUNTER
Andrew Daniels MD  You 16 hours ago (3:23 PM)     Yes thanks    Message text       Kamar Rodrigues MD 17 hours ago (2:49 PM)     Do you want me to have them extend the time she wears her monitor since she had to take it off because of skin irritation?

## 2021-03-30 RX ORDER — HYDROCHLOROTHIAZIDE 25 MG/1
TABLET ORAL
Qty: 90 TAB | Refills: 1 | OUTPATIENT
Start: 2021-03-30

## 2021-03-30 RX ORDER — OLMESARTAN MEDOXOMIL 40 MG/1
TABLET ORAL
Qty: 90 TAB | Refills: 1 | OUTPATIENT
Start: 2021-03-30

## 2021-04-15 ENCOUNTER — TELEPHONE (OUTPATIENT)
Dept: CARDIOLOGY CLINIC | Age: 51
End: 2021-04-15

## 2021-04-15 NOTE — TELEPHONE ENCOUNTER
Tae Messina MD  You 2 hours ago (1:06 PM)     Keep appointment as scheduled   Loop ok so far    Message text       Andrea Desouza MD 6 hours ago (8:48 AM)     Please review monitor result in my [facility] [place] folder. Follow up 4/27/21 VV. Let me know if I need to call before then.     Routing comment

## 2021-04-27 ENCOUNTER — VIRTUAL VISIT (OUTPATIENT)
Dept: CARDIOLOGY CLINIC | Age: 51
End: 2021-04-27
Payer: COMMERCIAL

## 2021-04-27 DIAGNOSIS — R00.2 PALPITATIONS: ICD-10-CM

## 2021-04-27 DIAGNOSIS — I15.8 OTHER SECONDARY HYPERTENSION: Primary | ICD-10-CM

## 2021-04-27 DIAGNOSIS — R06.83 SNORING: ICD-10-CM

## 2021-04-27 PROCEDURE — 99214 OFFICE O/P EST MOD 30 MIN: CPT | Performed by: SPECIALIST

## 2021-04-27 RX ORDER — METOPROLOL SUCCINATE 100 MG/1
100 TABLET, EXTENDED RELEASE ORAL DAILY
Qty: 90 TAB | Refills: 1 | Status: SHIPPED | OUTPATIENT
Start: 2021-04-27 | End: 2022-02-14

## 2021-04-27 RX ORDER — BUTALBITAL, ACETAMINOPHEN AND CAFFEINE 50; 325; 40 MG/1; MG/1; MG/1
1 TABLET ORAL AS NEEDED
COMMUNITY
Start: 2021-04-04 | End: 2021-12-06 | Stop reason: SDUPTHER

## 2021-04-27 NOTE — PROGRESS NOTES
[facility] Card[facility] Telemedicine Encounter                                                         Pursuant to the emergency declaration under the [facility], [facility] waiver authority and the [employer] Act, this Virtual  Visit was conducted, with patient's consent, to reduce the patient's risk of exposure to COVID-19 and provide continuity of care for an established patient. Services were provided through a video synchronous discussion virtually to substitute for in-person clinic visit. Subjective            HPI  Very pleasant 45years old lady with a past medical history remarkable for hyperlipidemia, hypertension, depression anxiety, migraine headaches, mitral valve prolapse palpitation who has just relocated for [place] and she is establishing cardiology follow-up.     She was followed by cardiologist for palpitation and shortness of breath and mitral valve prolapse.  To be noted that she has undergone a calcium score in 2019 which was 0.  She reports to me a transthoracic echocardiogram in 2019 with normal ejection fraction mitral valve prolapse.     Past surgical history: D&C, appendectomy.     Social history: She drinks occasionally.  She does not smoke.     Family history: History of aortic valve replacement in her father. Equilla Miracle in her family was also noted.           Past Medical History:   Diagnosis Date    Anxiety and depression     HTN (hypertension)     Migraines          Past Surgical History:   Procedure Laterality Date    HX  SECTION      HX DILATION AND CURETTAGE      HX ORTHOPAEDIC  2010    titanium conchita, cervical spine         Social History     Tobacco Use    Smoking status: Never Smoker    Smokeless tobacco: Never Used   Substance Use Topics    Alcohol use: Yes     Comment: 2-3 glasses of wine/month    Drug use: Never             There were no vitals taken for this visit. Wt Readings from Last 3 Encounters:   03/11/21 212 lb (96.2 kg)   08/13/20 207 lb (93.9 kg)           Review of Symptoms  11 systems reviewed, negative other than as stated in the HPI    Physical Exam:    Due to this being a TeleHealth evaluation, many elements of the physical examination are unable to be assessed. General: Well developed, in no acute distress, cooperative and alert  HEENT: Pupils equal/round. No marked JVD visible on video. Respiratory: No audible wheezing, no signs of respiratory distress, lips non cyanotic  Extremities:  No edema  Neuro: A&Ox3, speech clear, no facial droop, answering questions appropriately  Skin: Skin color is normal. No rashes or lesions. Non diaphoretic on visible skin during exam          Current Outpatient Medications on File Prior to Visit   Medication Sig Dispense Refill    Nurtec ODT 75 mg disintegrating tablet Take 75 mg by mouth as needed for Migraine.  metoprolol succinate (TOPROL-XL) 50 mg XL tablet Take 1 Tab by mouth daily. 90 Tab 1    olmesartan-hydroCHLOROthiazide (BENICAR HCT) 40-25 mg per tablet TAKE 1 TABLET BY MOUTH EVERY DAY 90 Tab 1    DULoxetine (CYMBALTA) 60 mg capsule TAKE 1 CAPSULE BY MOUTH EVERY DAY (60mg) 90 Cap 2    magnesium 250 mg tab Take 500 mg by mouth.  Emgality Pen 120 mg/mL injection INJECT 1ML SUBCUTANEOUSLY EVERY MONTH      tiZANidine (ZANAFLEX) 4 mg tablet TAKE 1 2 TABLETS BY MOUTH AT BEDTIME      butalbital-acetaminophen-caffeine (FIORICET, ESGIC) -40 mg per tablet Take 1 Tab by mouth as needed for Migraine. No current facility-administered medications on file prior to visit.              No results found for: CHOL, CHOLPOCT, CHOLX, CHLST, CHOLV, HDL, HDLPOC, HDLP, LDL, LDLCPOC, LDLC, DLDLP, VLDLC, VLDL, TGLX, TRIGL, TRIGP, TGLPOCT, CHHD, CHHDX      Lab Results   Component Value Date/Time    Sodium 138 03/11/2021 12:11 PM    Potassium 4.2 03/11/2021 12:11 PM    Chloride 105 03/11/2021 12:11 PM    CO2 29 03/11/2021 12:11 PM    Anion gap 4 (L) 03/11/2021 12:11 PM    Glucose 100 03/11/2021 12:11 PM    BUN 22 (H) 03/11/2021 12:11 PM    Creatinine 0.80 03/11/2021 12:11 PM    BUN/Creatinine ratio 28 (H) 03/11/2021 12:11 PM    GFR est AA >60 03/11/2021 12:11 PM    GFR est non-AA >60 03/11/2021 12:11 PM    Calcium 9.6 03/11/2021 12:11 PM         No results found for: ALT, GGT, GGTP, AP, APIT, APX, CBIL, TBIL, TBILI      No results found for: WBC, WBCLT, HGBPOC, HGB, HGBP, HCTPOC, HCT, PHCT, RBCH, PLT, MCV, HGBEXT, HCTEXT, PLTEXT          Assessment  /Plan  :  1.  Palpitations: She continues to have off-and-on palpitations although she did not have many palpitation when she was with the loop monitor. Loop monitor failed to reveal any complex dysrhythmias. Sinus tachycardia was observed. At this time she is tolerating well the 50 mg of Toprol. Also on account of hypertension I will increase Toprol to 100 mg daily. She will let me know if any issues occur with the increased dose of Toprol. To be noted that her BMP magnesium and TSH were essentially normal in March 2021. Sleep study not done yet. 2.  Hypertension: She remains hypertensive. Continue with Benicar HCT and increase Toprol to 100 mg daily. She will keep a log of her blood pressure. 3.  CAD: There is no history of CAD but she has a strong family history for coronary events early age and her sister had a myocardial infarction at age 43 and most of her family members have significant elevation in cholesterol and coronary artery disease. I have asked her to proceed with stress echocardiogram to rule out ischemia evaluate ejection fraction and evaluate rhythm issues and potential of blood pressure elevations with exercise. I will likely ask her to consider a calcium score even if the stress test is normal to better risk stratify her for coronary artery disease.     4.  Hyperlipidemia: She tells me she has a cholesterol in the 300s. Unable to tolerate 1 statin in the past.  All of her family has a very high cholesterol probably familial hypercholesterolemia. We will discuss this further at the next office visit we may consider the utilization of PCSK9 meters. Otherwise see her back 1 week after her stress echocardiogram.  Stress echocardiogram to be done approximately 2 weeks from the time she increases the Toprol to 100 mg daily     5. FRANCISCO: needs ruling out              We discussed the expected course, resolution and complications of the diagnosis(es) in detail. Medication risks, benefits, costs, interactions, and alternatives were discussed as indicated. I advised her   to contact the office if her condition worsens, changes or fails to improve as anticipated. she expressed understanding with the diagnosis(es) and plan        Patricia Cook MD      Greater than 20 minutes was spent in direct video patient care, planning and chart review. This visit was conducted using [employer] telemedicine services.

## 2021-04-27 NOTE — PATIENT INSTRUCTIONS
Please start taking Toprol XL 100mg daily. You will be scheduled for a Treadmill Stress Test after your appointment today. Please wear comfortable clothing (shorts or pants with a shirt or blouse) and walking/athletic shoes.     Do not eat or drink anything, except water, for at least 2 hours prior to your test.    Do not take Toprol XL 24hours prior to your test.    Follow up with Dr. Ml Perdomo after your test.

## 2021-05-27 ENCOUNTER — ANCILLARY PROCEDURE (OUTPATIENT)
Dept: CARDIOLOGY CLINIC | Age: 51
End: 2021-05-27
Payer: COMMERCIAL

## 2021-05-27 VITALS
WEIGHT: 212 LBS | DIASTOLIC BLOOD PRESSURE: 84 MMHG | SYSTOLIC BLOOD PRESSURE: 110 MMHG | HEIGHT: 67 IN | BODY MASS INDEX: 33.27 KG/M2

## 2021-05-27 DIAGNOSIS — R00.2 PALPITATIONS: ICD-10-CM

## 2021-05-27 PROCEDURE — 93351 STRESS TTE COMPLETE: CPT | Performed by: SPECIALIST

## 2021-05-28 LAB
ECHO AO ASC DIAM: 3.2 CM
ECHO AO ROOT DIAM: 3.28 CM
STRESS ANGINA INDEX: 0
STRESS BASELINE DIAS BP: 84 MMHG
STRESS BASELINE HR: 96 BPM
STRESS BASELINE SYS BP: 110 MMHG
STRESS ESTIMATED WORKLOAD: 7 METS
STRESS EXERCISE DUR MIN: NORMAL
STRESS O2 SAT PEAK: 99 %
STRESS PEAK DIAS BP: 100 MMHG
STRESS PEAK SYS BP: 200 MMHG
STRESS PERCENT HR ACHIEVED: 90 %
STRESS POST PEAK HR: 151 BPM
STRESS RATE PRESSURE PRODUCT: NORMAL BPM*MMHG
STRESS TARGET HR: 168 BPM

## 2021-06-01 ENCOUNTER — VIRTUAL VISIT (OUTPATIENT)
Dept: CARDIOLOGY CLINIC | Age: 51
End: 2021-06-01
Payer: COMMERCIAL

## 2021-06-01 DIAGNOSIS — E78.00 HYPERCHOLESTEREMIA: ICD-10-CM

## 2021-06-01 DIAGNOSIS — R00.2 PALPITATIONS: Primary | ICD-10-CM

## 2021-06-01 PROCEDURE — 99214 OFFICE O/P EST MOD 30 MIN: CPT | Performed by: SPECIALIST

## 2021-06-01 RX ORDER — ROSUVASTATIN CALCIUM 5 MG/1
5 TABLET, COATED ORAL
Qty: 90 TABLET | Refills: 2 | Status: SHIPPED | OUTPATIENT
Start: 2021-06-01 | End: 2022-03-01

## 2021-06-01 NOTE — PATIENT INSTRUCTIONS
Please START taking Crestor 5mg nightly. Please have FASTING lab work done in 2 months (8 weeks). Follow up with Dr. Ml Perdomo in 6 months.

## 2021-06-01 NOTE — PROGRESS NOTES
[facility] Card[facility] Telemedicine Encounter                                                         Pursuant to the emergency declaration under the [facility] waiver authority and the [employer] Act, this Virtual  Visit was conducted, with patient's consent, to reduce the patient's risk of exposure to COVID-19 and provide continuity of care for an established patient. Services were provided through a video synchronous discussion virtually to substitute for in-person clinic visit. Subjective    She is feeling ok not much palpitations   No cp or sob reported        HPI  Very pleasant 45years old lady with a past medical history remarkable for hyperlipidemia, hypertension, depression anxiety, migraine headaches, mitral valve prolapse palpitation who has just relocated for [place] and she is establishing cardiology follow-up.     She was followed by cardiologist for palpitation and shortness of breath and mitral valve prolapse.  To be noted that she has undergone a calcium score in November 2019 which was 0.  She reports to me a transthoracic echocardiogram in December 2019 with normal ejection fraction mitral valve prolapse.     Past surgical history: D&C, appendectomy.     Social history: She drinks occasionally.  She does not smoke.     Family history: History of aortic valve replacement in her father. Chloé Hansen in her family was also noted.             05/27/21    ECHO STRESS 05/28/2021 5/28/2021    Interpretation Summary  · Baseline ECG: Normal sinus rhythm, non-specific ST-T wave abnormalities. · Echo: Normal stress echocardiogram. Low risk study.     Signed by: Mulu Rico MD on 5/28/2021  4:42 PM                        Past Medical History:   Diagnosis Date    Anxiety and depression     HTN (hypertension)     Migraines          Past Surgical History:   Procedure Laterality Date    HX  SECTION      HX DILATION AND CURETTAGE      HX ORTHOPAEDIC  2010    titanium conchita, cervical spine         Social History     Tobacco Use    Smoking status: Never Smoker    Smokeless tobacco: Never Used   Substance Use Topics    Alcohol use: Yes     Comment: 2-3 glasses of wine/month    Drug use: Never             There were no vitals taken for this visit. Wt Readings from Last 3 Encounters:   21 212 lb (96.2 kg)   21 212 lb (96.2 kg)   20 207 lb (93.9 kg)           Review of Symptoms  11 systems reviewed, negative other than as stated in the HPI    Physical Exam:    Due to this being a TeleHealth evaluation, many elements of the physical examination are unable to be assessed. General: Well developed, in no acute distress, cooperative and alert  HEENT: Pupils equal/round. No marked JVD visible on video. Respiratory: No audible wheezing, no signs of respiratory distress, lips non cyanotic  Extremities:  No edema  Neuro: A&Ox3, speech clear, no facial droop, answering questions appropriately  Skin: Skin color is normal. No rashes or lesions. Non diaphoretic on visible skin during exam          Current Outpatient Medications on File Prior to Visit   Medication Sig Dispense Refill    butalbital-acetaminophen-caffeine (FIORICET, ESGIC) -40 mg per tablet Take 1 Tab by mouth as needed for Migraine.  metoprolol succinate (TOPROL-XL) 100 mg tablet Take 1 Tab by mouth daily. 90 Tab 1    Nurtec ODT 75 mg disintegrating tablet Take 75 mg by mouth as needed for Migraine.  olmesartan-hydroCHLOROthiazide (BENICAR HCT) 40-25 mg per tablet TAKE 1 TABLET BY MOUTH EVERY DAY 90 Tab 1    DULoxetine (CYMBALTA) 60 mg capsule TAKE 1 CAPSULE BY MOUTH EVERY DAY (60mg) 90 Cap 2    magnesium 250 mg tab Take 500 mg by mouth.       Emgality Pen 120 mg/mL injection INJECT 1ML SUBCUTANEOUSLY EVERY MONTH      tiZANidine (ZANAFLEX) 4 mg tablet TAKE 1 2 TABLETS BY MOUTH AT BEDTIME No current facility-administered medications on file prior to visit. No results found for: CHOL, CHOLPOCT, CHOLX, CHLST, CHOLV, HDL, HDLPOC, HDLP, LDL, LDLCPOC, LDLC, DLDLP, VLDLC, VLDL, TGLX, TRIGL, TRIGP, TGLPOCT, CHHD, CHHDX      Lab Results   Component Value Date/Time    Sodium 138 03/11/2021 12:11 PM    Potassium 4.2 03/11/2021 12:11 PM    Chloride 105 03/11/2021 12:11 PM    CO2 29 03/11/2021 12:11 PM    Anion gap 4 (L) 03/11/2021 12:11 PM    Glucose 100 03/11/2021 12:11 PM    BUN 22 (H) 03/11/2021 12:11 PM    Creatinine 0.80 03/11/2021 12:11 PM    BUN/Creatinine ratio 28 (H) 03/11/2021 12:11 PM    GFR est AA >60 03/11/2021 12:11 PM    GFR est non-AA >60 03/11/2021 12:11 PM    Calcium 9.6 03/11/2021 12:11 PM         No results found for: ALT, GGT, GGTP, AP, APIT, APX, CBIL, TBIL, TBILI      No results found for: WBC, WBCLT, HGBPOC, HGB, HGBP, HCTPOC, HCT, PHCT, RBCH, PLT, MCV, HGBEXT, HCTEXT, PLTEXT          Assessment  /Plan  :    1.  Palpitations: No much palpitations reported. She is now on increased dose of Toprol 100 mg daily and able to tolerate with no issues. Loop monitor from April 2021 failed to reveal any complex dysrhythmias. To be noted that her BMP magnesium and TSH were normal in March 2021. Next    Sleep study not done yet. 2.  Hypertension: Her blood pressure seems to be better controlled with increased increased dose of Toprol at this time. 3.  CAD: There is no history of CAD she did have a a 0 on calcium score 2019. But she does have a strong family history for coronary events at early age her sister had a myocardial infarction at age 43 most of her family members have significant location cholesterol coronary artery disease. She has undergone nonetheless stress echocardiogram May 2021 which was negative for ischemia myocardial infarction revealed an ejection fraction of 60%. 4.  Hyperlipidemia: Likely familial hyperlipidemia.   Discussed options she is intolerant to Lipitor she is willing to try different statin. I will start Crestor 5 mg daily and obtain lipids and liver and 8 weeks. She will let me know if any side effects should occur. Otherwise I will see her back in 6 months or sooner if any question or problems arise prior to that. We discussed the expected course, resolution and complications of the diagnosis(es) in detail. Medication risks, benefits, costs, interactions, and alternatives were discussed as indicated. I advised her   to contact the office if her condition worsens, changes or fails to improve as anticipated. she expressed understanding with the diagnosis(es) and plan        Indiana Goetz MD      Greater than 20 minutes was spent in direct video patient care, planning and chart review. This visit was conducted using [employer] telemedicine services.

## 2021-06-08 ENCOUNTER — DOCUMENTATION ONLY (OUTPATIENT)
Dept: SLEEP MEDICINE | Age: 51
End: 2021-06-08

## 2021-06-08 ENCOUNTER — TELEPHONE (OUTPATIENT)
Dept: SLEEP MEDICINE | Age: 51
End: 2021-06-08

## 2021-06-08 ENCOUNTER — VIRTUAL VISIT (OUTPATIENT)
Dept: SLEEP MEDICINE | Age: 51
End: 2021-06-08
Payer: COMMERCIAL

## 2021-06-08 DIAGNOSIS — I10 ESSENTIAL HYPERTENSION: ICD-10-CM

## 2021-06-08 DIAGNOSIS — G47.33 OBSTRUCTIVE SLEEP APNEA (ADULT) (PEDIATRIC): Primary | ICD-10-CM

## 2021-06-08 PROCEDURE — 99203 OFFICE O/P NEW LOW 30 MIN: CPT | Performed by: INTERNAL MEDICINE

## 2021-06-08 NOTE — PROGRESS NOTES
No prior authorization required for HSAT with [facility] on 06/08/2021

## 2021-06-08 NOTE — Clinical Note
Thank you for the referral.  I will keep you informed of her progress.   [address],  [place]

## 2021-06-08 NOTE — TELEPHONE ENCOUNTER
Spoke to patient on 06/08/2021 at 12:39pm to schedule HSAT , patient would like to check with her insurance company before she proceeds with the at home sleep test. Cpt code was provided.

## 2021-06-08 NOTE — PATIENT INSTRUCTIONS
[address] [facility]., [place]. [address] [facility], [address] Tel.  [phone] Fax. 100 [facility] [address] [place], [address] [facility] Tel.  [phone] Fax. [phone] [address] Tel.  [phone] Fax. [phone] Sleep Apnea: After Your Visit Your Care Instructions Sleep apnea occurs when you frequently stop breathing for 10 seconds or longer during sleep. It can be mild to severe, based on the number of times per hour that you stop breathing or have slowed breathing. Blocked or narrowed airways in your nose, mouth, or throat can cause sleep apnea. Your airway can become blocked when your throat muscles and tongue relax during sleep. Sleep apnea is common, occurring in 1 out of 20 individuals. Individuals having any of the following characteristics should be evaluated and treated right away due to high risk and detrimental consequences from untreated sleep apnea: 
1. Obesity 2. Congestive Heart failure 3. Atrial Fibrillation 4. Uncontrolled Hypertension 5. Type II Diabetes 6. Night-time Arrhythmias 7. Stroke 8. Pulmonary Hypertension 9. High-risk Driving Populations (pilots, truck drivers, etc.) 10. Patients Considering Weight-loss Surgery How do you know you have sleep apnea? You probably have sleep apnea if you answer 'yes' to 3 or more of the following questions: S - Have you been told that you Snore? T - Are you often Tired during the day? O - Has anyone Observed you stop breathing while sleeping? P- Do you have (or are being treated for) high blood Pressure? B - Are you obese (Body Mass Index > 35)? A - Is your Age 48years old or older? N - Is your Neck size greater than 16 inches? G - Are you male Gender? A sleep physician can prescribe a breathing device that prevents tissues in the throat from blocking your airway.  Or your doctor may recommend using a dental device (oral breathing device) to help keep your airway open. In some cases, surgery may be needed to remove enlarged tissues in the throat. Follow-up care is a key part of your treatment and safety. Be sure to make and go to all appointments, and call your doctor if you are having problems. It's also a good idea to know your test results and keep a list of the medicines you take. How can you care for yourself at home? · Lose weight, if needed. It may reduce the number of times you stop breathing or have slowed breathing. · Go to bed at the same time every night. · Sleep on your side. It may stop mild apnea. If you tend to roll onto your back, sew a pocket in the back of your pajama top. Put a tennis ball into the pocket, and stitch the pocket shut. This will help keep you from sleeping on your back. · Avoid alcohol and medicines such as sleeping pills and sedatives before bed. · Do not smoke. Smoking can make sleep apnea worse. If you need help quitting, talk to your doctor about stop-smoking programs and medicines. These can increase your chances of quitting for good. · Prop up the head of your bed 4 to 6 inches by putting bricks under the legs of the bed. · Treat breathing problems, such as a stuffy nose, caused by a cold or allergies. · Use a continuous positive airway pressure (CPAP) breathing machine if lifestyle changes do not help your apnea and your doctor recommends it. The machine keeps your airway from closing when you sleep. · If CPAP does not help you, ask your doctor whether you should try other breathing machines. A bilevel positive airway pressure machine has two types of air pressureâone for breathing in and one for breathing out. Another device raises or lowers air pressure as needed while you breathe. · If your nose feels dry or bleeds when using one of these machines, talk with your doctor about increasing moisture in the air. A humidifier may help.  
· If your nose is runny or stuffy from using a breathing machine, talk with your doctor about using decongestants or a corticosteroid nasal spray. When should you call for help? Watch closely for changes in your health, and be sure to contact your doctor if: 
· You still have sleep apnea even though you have made lifestyle changes. · You are thinking of trying a device such as CPAP. · You are having problems using a CPAP or similar machine. Where can you learn more? Go to [employer]. Enter C673 in the search box to learn more about \"Sleep Apnea: After Your Visit. \"  
© 4654-9770 Healthwise, Incorporated. Care instructions adapted under license by [facility] (which disclaims liability or warranty for this information). This care instruction is for use with your licensed healthcare professional. If you have questions about a medical condition or this instruction, always ask your healthcare professional. Catie Colvin any warranty or liability for your use of this information. PROPER SLEEP HYGIENE What to avoid · Do not have drinks with caffeine, such as coffee or black tea, for 8 hours before bed. · Do not smoke or use other types of tobacco near bedtime. Nicotine is a stimulant and can keep you awake. · Avoid drinking alcohol late in the evening, because it can cause you to wake in the middle of the night. · Do not eat a big meal close to bedtime. If you are hungry, eat a light snack. · Do not drink a lot of water close to bedtime, because the need to urinate may wake you up during the night. · Do not read or watch TV in bed. Use the bed only for sleeping and sexual activity. What to try · Go to bed at the same time every night, and wake up at the same time every morning. Do not take naps during the day. · Keep your bedroom quiet, dark, and cool. · Get regular exercise, but not within 3 to 4 hours of your bedtime. Joaquín Ely · Sleep on a comfortable pillow and mattress.  
· If watching the clock makes you anxious, turn it facing away from you so you cannot see the time. · If you worry when you lie down, start a worry book. Well before bedtime, write down your worries, and then set the book and your concerns aside. · Try meditation or other relaxation techniques before you go to bed. · If you cannot fall asleep, get up and go to another room until you feel sleepy. Do something relaxing. Repeat your bedtime routine before you go to bed again. · Make your house quiet and calm about an hour before bedtime. Turn down the lights, turn off the TV, log off the computer, and turn down the volume on music. This can help you relax after a busy day. Drowsy Driving The [employer] cites drowsiness as a causing factor in more than 719,403 police reported crashes annually, resulting in 76,000 injuries and 1,500 deaths. Other surveys suggest 55% of people polled have driven while drowsy in the past year, 23% had fallen asleep but not crashed, 3% crashed, and 2% had and accident due to drowsy driving. Who is at risk? Young Drivers: One study of drowsy driving accidents states that 55% of the drivers were under 25 years. Of those, 75% were male. Shift Workers and Travelers: People who work overnight or travel across time zones frequently are at higher risk of experiencing Circadian Rhythm Disorders. They are trying to work and function when their body is programed to sleep. Sleep Deprived: Lack of sleep has a serious impact on your ability to pay attention or focus on a task. Consistently getting less than the average of 8 hours your body needs creates partial or cumulative sleep deprivation. Untreated Sleep Disorders: Sleep Apnea, Narcolepsy, R.L.S., and other sleep disorders (untreated) prevent a person from getting enough restful sleep. This leads to excessive daytime sleepiness and increases the risk for drowsy driving accidents by up to 7 times.  
Medications / Alcohol: Even over the counter medications can cause drowsiness. Medications that impair a drivers attention should have a warning label. Alcohol naturally makes you sleepy and on its own can cause accidents. Combined with excessive drowsiness its effects are amplified. Signs of Drowsy Driving: * You don't remember driving the last few miles * You may drift out of your kellee * You are unable to focus and your thoughts wander * You may yawn more often than normal 
 * You have difficulty keeping your eyes open / nodding off * Missing traffic signs, speeding, or tailgating Prevention-  
Good sleep hygiene, lifestyle and behavioral choices have the most impact on drowsy driving. There is no substitute for sleep and the average person requires 8 hours nightly. If you find yourself driving drowsy, stop and sleep. Consider the sleep hygiene tips provided during your visit as well. Medication Refill Policy: Refills for all medications require 1 week advance notice. Please have your pharmacy fax a refill request. We are unable to fax, or call in \"controled substance\" medications and you will need to pick these prescriptions up from our office. Fast FiBR Activation Thank you for requesting access to Fast FiBR. Please follow the instructions below to securely access and download your online medical record. Fast Fi[employer] allows you to send messages to your doctor, view your test results, renew your prescriptions, schedule appointments, and more. How Do I Sign Up? 1. In your internet browser, go to [email or web address]. 2. Click on the First Time User? Click Here link in the Sign In box. You will see the New Member Sign Up page. 3. Enter your Fast FiBR Access Code exactly as it appears below. You will not need to use this code after youve completed the sign-up process. If you do not sign up before the expiration date, you must request a new code. Fast FiBR Access Code: Activation code not generated Current Fast FiBR Status: Active (This is the date your upad access code will ) 4. Enter the last four digits of your Social Security Number (xxxx) and Date of Birth (mm/dd/yyyy) as indicated and click Submit. You will be taken to the next sign-up page. 5. Create a upad ID. This will be your upad login ID and cannot be changed, so think of one that is secure and easy to remember. 6. Create a upad password. You can change your password at any time. 7. Enter your Password Reset Question and Answer. This can be used at a later time if you forget your password. 8. Enter your e-mail address. You will receive e-mail notification when new information is available in [address] 9. Click Sign Up. You can now view and download portions of your medical record. 10. Click the Download Summary menu link to download a portable copy of your medical information. Additional Information If you have questions, please call [phone]. Remember, upad is NOT to be used for urgent needs. For medical emergencies, dial 911.

## 2021-06-08 NOTE — PROGRESS NOTES
[address] [place]. [place], [address]  Tel.  [phone]  Fax. [address] [facility] [address]  [place], [address] [facility]  Tel.  [phone]  Fax. [phone] [address] [facility] Florecita Saez   Tel.  [phone]  Fax. [phone]         Subjective:        Telemedicine visit performed with verbal consent of the patient. Patient called and identity confirmed with 2 patient identifers    Patient was seen at home  Arabella Mtz is a 45 y.o. female who was seen by synchronous (real-time) audio-video technology on 6/8/2021. Consent:  She and/or her healthcare decision maker is aware that this patient-initiated Telehealth encounter is the equivalent to a face to face encounter in the sleep disorder center and has provided verbal consent to proceed: Yes    I was in the office while conducting this encounter. Arabella Mtz is an 45 y.o. female referred for evaluation for a sleep disorder. She complains of snoring, periods of not breathing associated with excessive daytime sleepiness. Symptoms began several years ago, unchanged since that time. She usually can fall asleep in 30-45 minutes. Family or house members note snoring, choking, periods of not breathing. She denies falling asleep while driving. Arabella Mtz does wake up frequently at night. She is not bothered by waking up too early and left unable to get back to sleep. She actually sleeps about 9 hours at night and wakes up about 3 times during the night. She does not work shifts: Ralf Giron indicates she does not get too little sleep at night. Her bedtime is 1000. She awakens at 0830. She does take naps. She takes 2 naps a week lasting 1. She has the following observed behaviors: Loud snoring, Light snoring, Twitching of legs or feet, Pauses in breathing, Grinding teeth, Kicking with legs;  (waking with a gasp or snort, Vivid dreams ).   Other remarks:      [place] Sleepiness Score: 8 Allergies   Allergen Reactions    Latex Hives, Itching and Other (comments)     Swelling at contact areas    Pb Swelling    Bactrim [Sulfamethoprim] Hives         Current Outpatient Medications:     rosuvastatin (CRESTOR) 5 mg tablet, Take 1 Tablet by mouth nightly., Disp: 90 Tablet, Rfl: 2    butalbital-acetaminophen-caffeine (FIORICET, ESGIC) -40 mg per tablet, Take 1 Tab by mouth as needed for Migraine. , Disp: , Rfl:     metoprolol succinate (TOPROL-XL) 100 mg tablet, Take 1 Tab by mouth daily. , Disp: 90 Tab, Rfl: 1    Nurtec ODT 75 mg disintegrating tablet, Take 75 mg by mouth as needed for Migraine. , Disp: , Rfl:     olmesartan-hydroCHLOROthiazide (BENICAR HCT) 40-25 mg per tablet, TAKE 1 TABLET BY MOUTH EVERY DAY, Disp: 90 Tab, Rfl: 1    DULoxetine (CYMBALTA) 60 mg capsule, TAKE 1 CAPSULE BY MOUTH EVERY DAY (60mg), Disp: 90 Cap, Rfl: 2    magnesium 250 mg tab, Take 500 mg by mouth., Disp: , Rfl:     Emgality Pen 120 mg/mL injection, INJECT 1ML SUBCUTANEOUSLY EVERY MONTH, Disp: , Rfl:     tiZANidine (ZANAFLEX) 4 mg tablet, TAKE 1 2 TABLETS BY MOUTH AT BEDTIME, Disp: , Rfl:      She  has a past medical history of Anxiety and depression, HTN (hypertension), and Migraines. She  has a past surgical history that includes hx  section (); hx orthopaedic (); and hx dilation and curettage (). She family history includes Alzheimer in her maternal grandmother; Cancer in her mother; Heart Attack in her maternal grandmother and sister; Heart Disease in her father; Hypertension in her maternal grandmother and sister; Dewanda Counts in her paternal grandmother; Other in her paternal grandfather; Prostate Cancer in her father; Stroke in her mother. She  reports that she has never smoked. She has never used smokeless tobacco. She reports current alcohol use. She reports that she does not use drugs.      Review of Systems:  Constitutional:  No significant weight loss or weight gain  Eyes:  No blurred vision  CVS:  No significant chest pain  Pulm:  No significant shortness of breath  GI:  No significant nausea or vomiting, rare GERD (depends on what she has eaten)  :  No significant nocturia  Musculoskeletal:  No significant joint pain at night  Skin:  No significant rashes  Neuro:  No significant dizziness   Psych: Anxiety and depression controlled    Sleep Review of Systems: notable for some difficulty falling asleep; +frequent awakenings at night;  + dreaming noted; no nightmares ; + early morning headaches; + memory problems; + concentration issues      Objective:     Weight 208 lb  Vital Signs: (As obtained by patient/caregiver at home)        Constitutional: [x] Appears well-developed and well-nourished [x] No apparent distress      [] Abnormal -     Mental status: [x] Alert and awake  [x] Oriented to person/place/time [x] Able to follow commands    [] Abnormal -     Eyes:   EOM    [x]  Normal    [] Abnormal -   Sclera  [x]  Normal    [] Abnormal -          Discharge [x]  None visible   [] Abnormal -     HENT: [x] Normocephalic, atraumatic  [] Abnormal -   [x] Mouth/Throat: Mucous membranes are moist                External Ears [x] Normal  [] Abnormal -    Neck: [x] No visualized mass [] Abnormal -     Pulmonary/Chest: [x] Respiratory effort normal   [x] No visualized signs of difficulty breathing or respiratory distress        [] Abnormal -       Neurological:        [x] No Facial Asymmetry (Cranial nerve 7 motor function) (limited exam due to video visit)          [x] No gaze palsy        [] Abnormal -          Skin:        [x] No significant exanthematous lesions or discoloration noted on facial skin         [] Abnormal -            Psychiatric:       [x] Normal Affect [] Abnormal -       Other pertinent observable physical exam findings:-          Assessment:       ICD-10-CM ICD-9-CM    1.  Obstructive sleep apnea (adult) (pediatric)  G47.33 327.23 SLEEP STUDY UNATTENDED, 4 CHANNEL   2. Essential hypertension  I10 401.9          Plan:       * Sleep testing was ordered for initial evaluation. * She was provided information on sleep apnea including coresponding risk factors and the importance of proper treatment. * Treatment options for sleep apnea were reviewed today. Patient agrees to a trial of APAP therapy if indicated. * Counseling was provided regarding proper sleep hygiene, appropriate sleep schedule, need for sleep environment safety and safe driving. * Effect of sleep disturbance on weight was reviewed. We have recommended a dedicated weight loss through appropriate diet and an exercise regimen as significant weight reduction has been shown to reduce severity of obstructive sleep apnea. * Patient agrees to telephone follow-up by sleep technologist shortly after sleep study to review results and plan final management. 2. Hypertension - she continues on her current regimen. I have reviewed the relationship between hypertension as it relates to sleep-disordered breathing. The treatment plan was reviewed with the patient in detail   . she understands that the lead technologist will be calling her  with the results and assisting with the next step in the treatment plan as outlined today during the consultation with me. All of her questions were addressed. Thank you for allowing us to participate in your patient's medical care. We'll keep you updated on these investigations. Pursuant to the emergency declaration under the [address] [facility] waiver authority and the [employer] Act, this Virtual  Visit was conducted, with patient's consent, to reduce the patient's risk of exposure to COVID-19 and provide continuity of care for an established patient.      Services were provided through a video synchronous discussion virtually to substitute for in-person clinic visit.     Hernán Victoria MD    Electronically signed by    Sarah Coon MD  Diplomate in Sleep Medicine  [facility]

## 2021-06-16 ENCOUNTER — HOSPITAL ENCOUNTER (OUTPATIENT)
Dept: SLEEP MEDICINE | Age: 51
Discharge: HOME | End: 2021-06-16
Payer: COMMERCIAL

## 2021-06-16 ENCOUNTER — OFFICE VISIT (OUTPATIENT)
Dept: SLEEP MEDICINE | Age: 51
End: 2021-06-16

## 2021-06-16 DIAGNOSIS — G47.33 OSA (OBSTRUCTIVE SLEEP APNEA): Primary | ICD-10-CM

## 2021-06-16 PROCEDURE — 95806 SLEEP STUDY UNATT&RESP EFFT: CPT | Performed by: INTERNAL MEDICINE

## 2021-06-16 NOTE — PROGRESS NOTES
Oz Lawrence is a 45 y.o. female seen today to receive a home sleep testing unit (HST). · Patient was educated on proper hookup and operation of the HST via detailed instruction sheet (per COVID-19 precautions)  · Instruction forms with after hours contact and documentation were signed. O>    There were no vitals taken for this visit. A>  No diagnosis found. P>  · General information regarding operations and maintenance of the device was provided. · Follow-up appointment was made to return the HST. She will be contacted once the results have been reviewed. · She was asked to contact our office for any problems regarding her home sleep test study.

## 2021-06-17 ENCOUNTER — DOCUMENTATION ONLY (OUTPATIENT)
Dept: SLEEP MEDICINE | Age: 51
End: 2021-06-17

## 2021-06-25 ENCOUNTER — TELEPHONE (OUTPATIENT)
Dept: SLEEP MEDICINE | Age: 51
End: 2021-06-25

## 2021-06-25 ENCOUNTER — DOCUMENTATION ONLY (OUTPATIENT)
Dept: SLEEP MEDICINE | Age: 51
End: 2021-06-25

## 2021-06-25 NOTE — TELEPHONE ENCOUNTER
Results of sleep study in [employer]  Lead tech to convey results to patient  Results of HSAT in [employer]    The home sleep apnea test showed AHI - 2.hour. THe lowest oxygen saturation was 87%. This correlates with a test that is negative for significant sleep apnea. Significant snoring noted. We had discussed treatment plan at initial consultation. Based on the results of the home sleep apnea test, APAP is not indicated at this time. she can minimize snoring by avoiding sleeping in the supine position or sleeping with head of bed elevated. .  Alcohol intake and or sedating medications aggravate snoring and respiratory events and their use should be minimized or avoided. she would benefit from a dedicated weight loss program.      Optimizing sleep habits by keeping bedtime and waketime regular; ensuring sufficient total sleep time; avoiding caffeine after 2 pm; avoid looking at the clock during the night. Ideally, the clock face should be turned away. A regular exercise schedule, at least 3 hours before bedtime, would be beneficial to improving sleep quality. avoid evening light and to use sunglasses in the late afternoon. Watching TV, using laptops, tablets and smartphones in the evening was discouraged. keep the bedroom cool and dark. Pets should not be allowed to sleep in the bed. Repeat HSAT is indicated if symptoms worsen.

## 2021-08-16 RX ORDER — OLMESARTAN MEDOXOMIL AND HYDROCHLOROTHIAZIDE 40/25 40; 25 MG/1; MG/1
TABLET ORAL
Qty: 90 TABLET | Refills: 1 | Status: SHIPPED | OUTPATIENT
Start: 2021-08-16 | End: 2022-02-21

## 2021-08-25 RX ORDER — METOPROLOL SUCCINATE 50 MG/1
TABLET, EXTENDED RELEASE ORAL
Qty: 90 TABLET | Refills: 1 | OUTPATIENT
Start: 2021-08-25

## 2021-08-25 NOTE — TELEPHONE ENCOUNTER
Cardiologist: Dr. Evangelina Baum    Last appt: 6/1/2021  Future Appointments   Date Time Provider Namrata Singh   10/1/2021  9:00 AM Louie Schmitt MD NEUM BS AMB   12/2/2021 11:40 AM MD MIGUEL ANGEL Rome BS AMB       Requested Prescriptions     Refused Prescriptions Disp Refills    metoprolol succinate (TOPROL-XL) 50 mg XL tablet [Pharmacy Med Name: METOPROLOL SUCC ER 50 MG TAB] 90 Tablet 1     Sig: TAKE 1 TABLET BY MOUTH EVERY DAY     Refused By: Kathy Grigsby     Reason for Refusal: Medication dose changed

## 2021-10-01 ENCOUNTER — OFFICE VISIT (OUTPATIENT)
Dept: NEUROLOGY | Age: 51
End: 2021-10-01
Payer: COMMERCIAL

## 2021-10-01 VITALS
HEART RATE: 71 BPM | DIASTOLIC BLOOD PRESSURE: 84 MMHG | BODY MASS INDEX: 34.14 KG/M2 | WEIGHT: 218 LBS | SYSTOLIC BLOOD PRESSURE: 142 MMHG | RESPIRATION RATE: 16 BRPM | OXYGEN SATURATION: 97 %

## 2021-10-01 DIAGNOSIS — G43.719 INTRACTABLE CHRONIC MIGRAINE WITHOUT AURA AND WITHOUT STATUS MIGRAINOSUS: Primary | ICD-10-CM

## 2021-10-01 DIAGNOSIS — R42 DIZZINESS: ICD-10-CM

## 2021-10-01 DIAGNOSIS — M47.812 CERVICAL SPONDYLOSIS: ICD-10-CM

## 2021-10-01 DIAGNOSIS — G43.019 INTRACTABLE MIGRAINE WITHOUT AURA AND WITHOUT STATUS MIGRAINOSUS: ICD-10-CM

## 2021-10-01 DIAGNOSIS — G43.109 MIGRAINE WITH VERTIGO: ICD-10-CM

## 2021-10-01 DIAGNOSIS — R51.9 HEADACHE DISORDER: ICD-10-CM

## 2021-10-01 DIAGNOSIS — E56.9 HYPOVITAMINOSIS: ICD-10-CM

## 2021-10-01 DIAGNOSIS — M54.2 CERVICALGIA: ICD-10-CM

## 2021-10-01 PROCEDURE — 99205 OFFICE O/P NEW HI 60 MIN: CPT | Performed by: PSYCHIATRY & NEUROLOGY

## 2021-10-01 RX ORDER — PREDNISONE 10 MG/1
10 TABLET ORAL
Qty: 20 TABLET | Refills: 0 | Status: SHIPPED | OUTPATIENT
Start: 2021-10-01 | End: 2021-11-03 | Stop reason: ALTCHOICE

## 2021-10-01 RX ORDER — RIMEGEPANT SULFATE 75 MG/75MG
TABLET, ORALLY DISINTEGRATING ORAL
Qty: 16 TABLET | Refills: 3 | Status: SHIPPED | OUTPATIENT
Start: 2021-10-01 | End: 2022-02-21 | Stop reason: SDUPTHER

## 2021-10-01 NOTE — PROGRESS NOTES
Neurology Consult Note      HISTORY PROVIDED BY: patient    Chief Complaint:   Chief Complaint   Patient presents with    New Patient     chronic migraines, neck pain titanium conchita replaced two discs, headaches from neck      Subjective:    Wendie Amanda is a 45 y.o. right handed female who presents in consultation for headaches back and neck pain. This is a 50-year-old right-handed white female with history of generalized anxiety disorder, depression, hypertension, migraine headache, status post cervical spine surgery, who presented to the clinic to evaluate and continue management for migraine headache. Patient says she has been having headache for quite some time, was diagnosed with migraine headache. Patient says when she was in [place], the headache was fairly controlled, however since moving up to [place] headache has been more frequent. Headache is throbbing in nature frontal, sharp pain coming from the back of the head. Patient says since after the neck surgery with titanium in the spine, sharp pain has been coming from the back of the head translating into headache. Headache associated with dizziness, blurry vision, occasional double vision, nausea, photophobia, phonophobia. Frequency of headache has been every other day. Patient headache since she moved up to [place] was being managed at [facility]. Patient has been tried on different preventive, beta-blocker, calcium channel blocker like verapamil, antidepressant like Elavil and nortriptyline, currently on Topamax without much improvement. As patient has been tried on this different medications, and Topamax seem to be affecting her memory, I will discontinue Topamax, start patient on Nurtec every other day for preventive, Nurtec as needed for abortive. She says lately she has been having sharp pain in the head which she describes as lightning sharp pain, the pain sometimes wakes patient up at night, according to patient this is new.  She says headache sometimes is aggravated by stress and lack of sleep otherwise there is no relieving or aggravating factor. Patient says she stumbles at times, has not had any fall. Patient noted that she has been having increased neck pain, pain is sharp in nature, persistent, continuous burning sensation that goes down to the arms causing numbness and tingling sensation with weakness, sharp pain going up to the head translating into headache. Patient denies loss of consciousness, dysphagia or odynophagia. Review of Systems - General ROS: positive for  - fatigue, night sweats and sleep disturbance  Psychological ROS: positive for - anxiety, concentration difficulties, depression and sleep disturbances  Ophthalmic ROS: positive for - blurry vision, decreased vision and photophobia  ENT ROS: positive for - headaches, tinnitus, vertigo and visual changes  Allergy and Immunology ROS: negative  Hematological and Lymphatic ROS: negative  Endocrine ROS: negative  Respiratory ROS: no cough, shortness of breath, or wheezing  Cardiovascular ROS: no chest pain or dyspnea on exertion  Gastrointestinal ROS: no abdominal pain, change in bowel habits, or black or bloody stools  Genito-Urinary ROS: no dysuria, trouble voiding, or hematuria  Musculoskeletal ROS: positive for - joint pain, muscle pain and muscular weakness  Neurological ROS: positive for - dizziness, headaches, numbness/tingling, visual changes and weakness  Dermatological ROS: negative  Due to new symptoms of sharp pain in the head, I will obtain MRI of the brain with and without gadolinium to evaluate for intracranial lesion.   Past Medical History:   Diagnosis Date    Anxiety and depression     HTN (hypertension)     Migraines       Past Surgical History:   Procedure Laterality Date    HX  SECTION      HX DILATION AND CURETTAGE      HX ORTHOPAEDIC  2010    titanium conchita, cervical spine      Social History     Socioeconomic History    Marital status:      Spouse name: Not on file    Number of children: Not on file    Years of education: Not on file    Highest education level: Not on file   Occupational History    Not on file   Tobacco Use    Smoking status: Never Smoker    Smokeless tobacco: Never Used   Substance and Sexual Activity    Alcohol use: Yes     Comment: 2-3 glasses of wine/month    Drug use: Never    Sexual activity: Not on file   Other Topics Concern    Not on file   Social History Narrative    Not on file     Social Determinants of Health     Financial Resource Strain:     Difficulty of Paying Living Expenses:    Food Insecurity:     Worried About Running Out of Food in the Last Year:     [address] in the Last Year:    Transportation Needs:     Lack of Transportation (Medical):      Lack of Transportation (Non-Medical):    Physical Activity:     Days of Exercise per Week:     Minutes of Exercise per Session:    Stress:     Feeling of Stress :    Social Connections:     Frequency of Communication with Friends and Family:     Frequency of Social Gatherings with Friends and Family:     Attends Voodoo Services:     Active Member of Clubs or Organizations:     Attends Club or Organization Meetings:     Marital Status:    Intimate Partner Violence:     Fear of Current or Ex-Partner:     Emotionally Abused:     Physically Abused:     Sexually Abused:      Family History   Problem Relation Age of Onset    Stroke Mother     Cancer Mother         brain tumor    Heart Disease Father     Prostate Cancer Father     Hypertension Sister     Heart Attack Sister     Alzheimer Maternal Grandmother     Heart Attack Maternal Grandmother     Hypertension Maternal Grandmother     Lung Cancer Paternal Grandmother     Other Paternal Grandfather         cirrhosis          Objective:   ROS  As per HPI    Allergies   Allergen Reactions    Latex Hives, Itching and Other (comments)     Swelling at contact areas   [facility] Swelling    Bactrim [Sulfamethoprim] Hives        Meds:  Outpatient Medications Prior to Visit   Medication Sig Dispense Refill    olmesartan-hydroCHLOROthiazide (BENICAR HCT) 40-25 mg per tablet TAKE 1 TABLET BY MOUTH EVERY DAY 90 Tablet 1    rosuvastatin (CRESTOR) 5 mg tablet Take 1 Tablet by mouth nightly. 90 Tablet 2    butalbital-acetaminophen-caffeine (FIORICET, ESGIC) -40 mg per tablet Take 1 Tab by mouth as needed for Migraine.  metoprolol succinate (TOPROL-XL) 100 mg tablet Take 1 Tab by mouth daily. 90 Tab 1    DULoxetine (CYMBALTA) 60 mg capsule TAKE 1 CAPSULE BY MOUTH EVERY DAY (60mg) 90 Cap 2    magnesium 250 mg tab Take 500 mg by mouth.  Emgality Pen 120 mg/mL injection INJECT 1ML SUBCUTANEOUSLY EVERY MONTH      tiZANidine (ZANAFLEX) 4 mg tablet TAKE 1 2 TABLETS BY MOUTH AT BEDTIME      Nurtec ODT 75 mg disintegrating tablet Take 75 mg by mouth as needed for Migraine. No facility-administered medications prior to visit. Imaging:  MRI Results (most recent):  No results found for this or any previous visit. CT Results (most recent):  Results from Abstract encounter on 09/24/20    CT CARDIAC SCORE LTD       Reviewed records in [facility] and media tab today    Lab Review   Results for orders placed or performed in visit on 10/01/21   RHEUMATOID FACTOR, QL   Result Value Ref Range    Rheumatoid factor <10.0 0.0 - 13.9 IU/mL        Exam:  Visit Vitals  BP (!) 142/84 (BP 1 Location: Left upper arm, BP Patient Position: Sitting, BP Cuff Size: Adult)   Pulse 71   Resp 16   Wt 218 lb (98.9 kg)   SpO2 97%   BMI 34.14 kg/m²     General:  Alert, cooperative, no distress. Head:  Normocephalic, without obvious abnormality, atraumatic. Respiratory:  Heart:   Non labored breathing  Regular rate and rhythm, no murmurs   Neck:   2+ carotids, no bruits   Extremities: Warm, no cyanosis or edema. Pulses: 2+ radial pulses.        Neurologic:  MS: Alert and oriented x 4, speech intact. Language intact, able to name, repeat, and follow all commands. Attention and fund of knowledge appropriate. Recent and remote memory intact. Cranial Nerves:  II: visual fields Full to confrontation   II: pupils Equal, round, reactive to light   II: optic disc No papilledema   III,VII: ptosis none   III,IV,VI: extraocular muscles  EOMI, no nystagmus or diplopia   V: facial light touch sensation  normal   VII: facial muscle function   symmetric   VIII: hearing intact   IX: soft palate elevation  normal   XI: trapezius strength  5/5   XI: sternocleidomastoid strength 5/5   XII: tongue  Midline     Motor: normal bulk and tone, no tremor              Strength: 5/5 throughout, no PD  Sensory: Equivocal sensation t to LT, PP, temperature and vibration  Coordination: FTN and HTS intact, AMBER intact,Romberg negative  Gait: Slightly unsteady gait, unable to heel, toe, and tandem walk  Reflexes: 3+ symmetric,  Plantar: Withdrawn           Assessment/Plan       ICD-10-CM ICD-9-CM    1. Intractable chronic migraine without aura and without status migrainosus  G43.719 346.71 MRI BRAIN W WO CONT      ALDOLASE   2. Intractable migraine without aura and without status migrainosus  G43.019 346.11    3. Cervical spondylosis  M47.812 721.0 ALDOLASE      RHEUMATOID FACTOR, QL      MONICA, DIRECT, W/REFLEX      PROTEIN ELECTROPHORESIS      CK      ANGIOTENSIN CONVERTING ENZYME      RHEUMATOID FACTOR, QL   4. Cervicalgia  M54.2 723.1 RHEUMATOID FACTOR, QL      MONICA, DIRECT, W/REFLEX      PROTEIN ELECTROPHORESIS      CK      ANGIOTENSIN CONVERTING ENZYME      RHEUMATOID FACTOR, QL   5. Headache disorder  R51.9 784.0 MRI BRAIN W WO CONT      VITAMIN B12      RHEUMATOID FACTOR, QL      MONICA, DIRECT, W/REFLEX      PROTEIN ELECTROPHORESIS      CK      VITAMIN D, 25 HYDROXY      RHEUMATOID FACTOR, QL   6.  Dizziness  R42 780.4 MRI BRAIN W WO CONT      ALDOLASE      RHEUMATOID FACTOR, QL      MONICA, DIRECT, W/REFLEX      PROTEIN ELECTROPHORESIS      VITAMIN D, 25 HYDROXY      ANGIOTENSIN CONVERTING ENZYME      RHEUMATOID FACTOR, QL   7. Migraine with vertigo  G43.109 346.80 MRI BRAIN W WO CONT     780.4    8. Hypovitaminosis  E56.9 269.2 VITAMIN B12      VITAMIN D, 25 HYDROXY   Plan:  Prednisone 10 mg p.o.taper  Nurtec ODT 75 mg p.o. every other day, 75 mg p.o. as needed for severe headache not to exceed 1 dose in 24 hours  MRI of the brain with and without gadolinium  Blood for autoimmune work-up, vitamin D, vitamin B12, ESR, CK, aldolase. Follow-up and Dispositions    · Return in about 2 months (around 12/1/2021). Thank you very much for this consultation.          Signed:  Osiris Davis MD  10/1/2021

## 2021-10-02 LAB — RHEUMATOID FACT SERPL-ACNC: <10 IU/ML (ref 0–13.9)

## 2021-10-14 ENCOUNTER — HOSPITAL ENCOUNTER (OUTPATIENT)
Dept: MRI IMAGING | Age: 51
Discharge: HOME | End: 2021-10-14
Attending: PSYCHIATRY & NEUROLOGY
Payer: COMMERCIAL

## 2021-10-14 DIAGNOSIS — R51.9 HEADACHE DISORDER: ICD-10-CM

## 2021-10-14 DIAGNOSIS — R42 DIZZINESS: ICD-10-CM

## 2021-10-14 DIAGNOSIS — G43.719 INTRACTABLE CHRONIC MIGRAINE WITHOUT AURA AND WITHOUT STATUS MIGRAINOSUS: ICD-10-CM

## 2021-10-14 DIAGNOSIS — G43.109 MIGRAINE WITH VERTIGO: ICD-10-CM

## 2021-10-14 PROCEDURE — A9576 INJ PROHANCE MULTIPACK: HCPCS | Performed by: PSYCHIATRY & NEUROLOGY

## 2021-10-14 PROCEDURE — 70553 MRI BRAIN STEM W/O & W/DYE: CPT

## 2021-10-14 PROCEDURE — 74011250636 HC RX REV CODE- 250/636: Performed by: PSYCHIATRY & NEUROLOGY

## 2021-10-14 RX ADMIN — GADOTERIDOL 18 ML: 279.3 INJECTION, SOLUTION INTRAVENOUS at 12:22

## 2021-10-18 ENCOUNTER — APPOINTMENT (OUTPATIENT)
Dept: CT IMAGING | Age: 51
DRG: 339 | End: 2021-10-18
Attending: EMERGENCY MEDICINE
Payer: COMMERCIAL

## 2021-10-18 ENCOUNTER — HOSPITAL ENCOUNTER (INPATIENT)
Age: 51
LOS: 2 days | Discharge: HOME | DRG: 339 | End: 2021-10-27
Attending: EMERGENCY MEDICINE | Admitting: SURGERY
Payer: COMMERCIAL

## 2021-10-18 DIAGNOSIS — K35.32 APPENDICITIS WITH PERFORATION: ICD-10-CM

## 2021-10-18 DIAGNOSIS — K35.32 PERFORATED APPENDICITIS: Primary | ICD-10-CM

## 2021-10-18 LAB
APPEARANCE UR: CLEAR
BACTERIA URNS QL MICRO: NEGATIVE /HPF
BASOPHILS # BLD: 0.1 K/UL (ref 0–0.1)
BASOPHILS NFR BLD: 0 % (ref 0–1)
BILIRUB UR QL: NEGATIVE
COLOR UR: ABNORMAL
DIFFERENTIAL METHOD BLD: ABNORMAL
EOSINOPHIL # BLD: 0.1 K/UL (ref 0–0.4)
EOSINOPHIL NFR BLD: 0 % (ref 0–7)
EPITH CASTS URNS QL MICRO: ABNORMAL /LPF
ERYTHROCYTE [DISTWIDTH] IN BLOOD BY AUTOMATED COUNT: 13.1 % (ref 11.5–14.5)
GLUCOSE UR STRIP.AUTO-MCNC: NEGATIVE MG/DL
HCG UR QL: NEGATIVE
HCT VFR BLD AUTO: 40.3 % (ref 35–47)
HGB BLD-MCNC: 13.2 G/DL (ref 11.5–16)
HGB UR QL STRIP: ABNORMAL
IMM GRANULOCYTES # BLD AUTO: 0.2 K/UL (ref 0–0.04)
IMM GRANULOCYTES NFR BLD AUTO: 1 % (ref 0–0.5)
KETONES UR QL STRIP.AUTO: NEGATIVE MG/DL
LEUKOCYTE ESTERASE UR QL STRIP.AUTO: NEGATIVE
LYMPHOCYTES # BLD: 2.8 K/UL (ref 0.8–3.5)
LYMPHOCYTES NFR BLD: 16 % (ref 12–49)
MCH RBC QN AUTO: 30 PG (ref 26–34)
MCHC RBC AUTO-ENTMCNC: 32.8 G/DL (ref 30–36.5)
MCV RBC AUTO: 91.6 FL (ref 80–99)
MONOCYTES # BLD: 1.3 K/UL (ref 0–1)
MONOCYTES NFR BLD: 8 % (ref 5–13)
NEUTS SEG # BLD: 12.5 K/UL (ref 1.8–8)
NEUTS SEG NFR BLD: 75 % (ref 32–75)
NITRITE UR QL STRIP.AUTO: NEGATIVE
NRBC # BLD: 0 K/UL (ref 0–0.01)
NRBC BLD-RTO: 0 PER 100 WBC
PH UR STRIP: 7 [PH] (ref 5–8)
PLATELET # BLD AUTO: 287 K/UL (ref 150–400)
PMV BLD AUTO: 10.4 FL (ref 8.9–12.9)
PROT UR STRIP-MCNC: NEGATIVE MG/DL
RBC # BLD AUTO: 4.4 M/UL (ref 3.8–5.2)
RBC #/AREA URNS HPF: ABNORMAL /HPF (ref 0–5)
SP GR UR REFRACTOMETRY: 1.01 (ref 1–1.03)
UA: UC IF INDICATED,UAUC: ABNORMAL
UROBILINOGEN UR QL STRIP.AUTO: 0.2 EU/DL (ref 0.2–1)
WBC # BLD AUTO: 16.9 K/UL (ref 3.6–11)
WBC URNS QL MICRO: ABNORMAL /HPF (ref 0–4)

## 2021-10-18 PROCEDURE — 80053 COMPREHEN METABOLIC PANEL: CPT

## 2021-10-18 PROCEDURE — 83690 ASSAY OF LIPASE: CPT

## 2021-10-18 PROCEDURE — 96374 THER/PROPH/DIAG INJ IV PUSH: CPT

## 2021-10-18 PROCEDURE — 96375 TX/PRO/DX INJ NEW DRUG ADDON: CPT

## 2021-10-18 PROCEDURE — 81025 URINE PREGNANCY TEST: CPT

## 2021-10-18 PROCEDURE — 99283 EMERGENCY DEPT VISIT LOW MDM: CPT

## 2021-10-18 PROCEDURE — 83735 ASSAY OF MAGNESIUM: CPT

## 2021-10-18 PROCEDURE — 74011250636 HC RX REV CODE- 250/636: Performed by: EMERGENCY MEDICINE

## 2021-10-18 PROCEDURE — 85025 COMPLETE CBC W/AUTO DIFF WBC: CPT

## 2021-10-18 PROCEDURE — 81001 URINALYSIS AUTO W/SCOPE: CPT

## 2021-10-18 RX ORDER — ONDANSETRON 2 MG/ML
4 INJECTION INTRAMUSCULAR; INTRAVENOUS ONCE
Status: COMPLETED | OUTPATIENT
Start: 2021-10-18 | End: 2021-10-18

## 2021-10-18 RX ORDER — HYDROMORPHONE HYDROCHLORIDE 1 MG/ML
1 INJECTION, SOLUTION INTRAMUSCULAR; INTRAVENOUS; SUBCUTANEOUS
Status: COMPLETED | OUTPATIENT
Start: 2021-10-19 | End: 2021-10-18

## 2021-10-18 RX ORDER — KETOROLAC TROMETHAMINE 30 MG/ML
30 INJECTION, SOLUTION INTRAMUSCULAR; INTRAVENOUS
Status: COMPLETED | OUTPATIENT
Start: 2021-10-18 | End: 2021-10-18

## 2021-10-18 RX ADMIN — KETOROLAC TROMETHAMINE 30 MG: 30 INJECTION, SOLUTION INTRAMUSCULAR; INTRAVENOUS at 23:15

## 2021-10-18 RX ADMIN — ONDANSETRON 4 MG: 2 INJECTION INTRAMUSCULAR; INTRAVENOUS at 23:15

## 2021-10-18 RX ADMIN — HYDROMORPHONE HYDROCHLORIDE 1 MG: 1 INJECTION, SOLUTION INTRAMUSCULAR; INTRAVENOUS; SUBCUTANEOUS at 23:42

## 2021-10-18 RX ADMIN — SODIUM CHLORIDE 1000 ML: 9 INJECTION, SOLUTION INTRAVENOUS at 23:15

## 2021-10-19 ENCOUNTER — ANESTHESIA (OUTPATIENT)
Dept: SURGERY | Age: 51
DRG: 339 | End: 2021-10-19
Payer: COMMERCIAL

## 2021-10-19 ENCOUNTER — APPOINTMENT (OUTPATIENT)
Dept: CT IMAGING | Age: 51
DRG: 339 | End: 2021-10-19
Attending: EMERGENCY MEDICINE
Payer: COMMERCIAL

## 2021-10-19 ENCOUNTER — ANESTHESIA EVENT (OUTPATIENT)
Dept: SURGERY | Age: 51
DRG: 339 | End: 2021-10-19
Payer: COMMERCIAL

## 2021-10-19 PROBLEM — K37 APPENDICITIS: Status: ACTIVE | Noted: 2021-10-19

## 2021-10-19 PROBLEM — I10 HYPERTENSION: Status: ACTIVE | Noted: 2021-10-19

## 2021-10-19 LAB
ALBUMIN SERPL-MCNC: 3.9 G/DL (ref 3.5–5)
ALBUMIN/GLOB SERPL: 0.9 {RATIO} (ref 1.1–2.2)
ALP SERPL-CCNC: 121 U/L (ref 45–117)
ALT SERPL-CCNC: 32 U/L (ref 12–78)
ANION GAP SERPL CALC-SCNC: 13 MMOL/L (ref 5–15)
ANION GAP SERPL CALC-SCNC: 8 MMOL/L (ref 5–15)
AST SERPL-CCNC: 21 U/L (ref 15–37)
BILIRUB SERPL-MCNC: 0.6 MG/DL (ref 0.2–1)
BUN SERPL-MCNC: 14 MG/DL (ref 6–20)
BUN SERPL-MCNC: 17 MG/DL (ref 6–20)
BUN/CREAT SERPL: 16 (ref 12–20)
BUN/CREAT SERPL: 19 (ref 12–20)
CALCIUM SERPL-MCNC: 10 MG/DL (ref 8.5–10.1)
CALCIUM SERPL-MCNC: 9 MG/DL (ref 8.5–10.1)
CHLORIDE SERPL-SCNC: 104 MMOL/L (ref 97–108)
CHLORIDE SERPL-SCNC: 99 MMOL/L (ref 97–108)
CO2 SERPL-SCNC: 27 MMOL/L (ref 21–32)
CO2 SERPL-SCNC: 28 MMOL/L (ref 21–32)
COVID-19 RAPID TEST, COVR: NOT DETECTED
CREAT SERPL-MCNC: 0.88 MG/DL (ref 0.55–1.02)
CREAT SERPL-MCNC: 0.9 MG/DL (ref 0.55–1.02)
GLOBULIN SER CALC-MCNC: 4.5 G/DL (ref 2–4)
GLUCOSE SERPL-MCNC: 124 MG/DL (ref 65–100)
GLUCOSE SERPL-MCNC: 154 MG/DL (ref 65–100)
LIPASE SERPL-CCNC: 65 U/L (ref 73–393)
MAGNESIUM SERPL-MCNC: 1.7 MG/DL (ref 1.6–2.4)
POTASSIUM SERPL-SCNC: 2.8 MMOL/L (ref 3.5–5.1)
POTASSIUM SERPL-SCNC: 2.9 MMOL/L (ref 3.5–5.1)
PROT SERPL-MCNC: 8.4 G/DL (ref 6.4–8.2)
SODIUM SERPL-SCNC: 139 MMOL/L (ref 136–145)
SODIUM SERPL-SCNC: 140 MMOL/L (ref 136–145)
SOURCE, COVRS: NORMAL

## 2021-10-19 PROCEDURE — 74011250636 HC RX REV CODE- 250/636: Performed by: SURGERY

## 2021-10-19 PROCEDURE — 77030040361 HC SLV COMPR DVT MDII -B: Performed by: SURGERY

## 2021-10-19 PROCEDURE — 2709999900 HC NON-CHARGEABLE SUPPLY: Performed by: SURGERY

## 2021-10-19 PROCEDURE — 36415 COLL VENOUS BLD VENIPUNCTURE: CPT

## 2021-10-19 PROCEDURE — 96375 TX/PRO/DX INJ NEW DRUG ADDON: CPT

## 2021-10-19 PROCEDURE — 76060000035 HC ANESTHESIA 2 TO 2.5 HR: Performed by: SURGERY

## 2021-10-19 PROCEDURE — 99218 HC RM OBSERVATION: CPT

## 2021-10-19 PROCEDURE — 77030003578 HC NDL INSUF VERES AMR -B: Performed by: SURGERY

## 2021-10-19 PROCEDURE — 77030040830 HC CATH URETH FOL MDII -A: Performed by: SURGERY

## 2021-10-19 PROCEDURE — 74011000250 HC RX REV CODE- 250: Performed by: SURGERY

## 2021-10-19 PROCEDURE — 99024 POSTOP FOLLOW-UP VISIT: CPT | Performed by: SURGERY

## 2021-10-19 PROCEDURE — 77030037366 HC STPLR ENDO TRI-STPLR COVD -C: Performed by: SURGERY

## 2021-10-19 PROCEDURE — 74011000258 HC RX REV CODE- 258: Performed by: NURSE ANESTHETIST, CERTIFIED REGISTERED

## 2021-10-19 PROCEDURE — 76210000006 HC OR PH I REC 0.5 TO 1 HR: Performed by: SURGERY

## 2021-10-19 PROCEDURE — 77030037032 HC INSRT SCIS CLICKLLINE DISP STOR -B: Performed by: SURGERY

## 2021-10-19 PROCEDURE — 74011000258 HC RX REV CODE- 258: Performed by: SURGERY

## 2021-10-19 PROCEDURE — 96376 TX/PRO/DX INJ SAME DRUG ADON: CPT

## 2021-10-19 PROCEDURE — 74011250637 HC RX REV CODE- 250/637: Performed by: SURGERY

## 2021-10-19 PROCEDURE — 77030002933 HC SUT MCRYL J&J -A: Performed by: SURGERY

## 2021-10-19 PROCEDURE — 74011250636 HC RX REV CODE- 250/636: Performed by: ANESTHESIOLOGY

## 2021-10-19 PROCEDURE — 77030026438 HC STYL ET INTUB CARD -A: Performed by: NURSE ANESTHETIST, CERTIFIED REGISTERED

## 2021-10-19 PROCEDURE — 77030008756 HC TU IRR SUC STRY -B: Performed by: SURGERY

## 2021-10-19 PROCEDURE — 74011250636 HC RX REV CODE- 250/636: Performed by: EMERGENCY MEDICINE

## 2021-10-19 PROCEDURE — 80048 BASIC METABOLIC PNL TOTAL CA: CPT

## 2021-10-19 PROCEDURE — 74011000250 HC RX REV CODE- 250: Performed by: NURSE ANESTHETIST, CERTIFIED REGISTERED

## 2021-10-19 PROCEDURE — 74011000636 HC RX REV CODE- 636: Performed by: EMERGENCY MEDICINE

## 2021-10-19 PROCEDURE — P9045 ALBUMIN (HUMAN), 5%, 250 ML: HCPCS | Performed by: NURSE ANESTHETIST, CERTIFIED REGISTERED

## 2021-10-19 PROCEDURE — 74177 CT ABD & PELVIS W/CONTRAST: CPT

## 2021-10-19 PROCEDURE — 77030013079 HC BLNKT BAIR HGGR 3M -A: Performed by: NURSE ANESTHETIST, CERTIFIED REGISTERED

## 2021-10-19 PROCEDURE — 77030038552 HC DRN WND MDII -A: Performed by: SURGERY

## 2021-10-19 PROCEDURE — 74011000258 HC RX REV CODE- 258: Performed by: EMERGENCY MEDICINE

## 2021-10-19 PROCEDURE — 77030031139 HC SUT VCRL2 J&J -A: Performed by: SURGERY

## 2021-10-19 PROCEDURE — 77030020829: Performed by: SURGERY

## 2021-10-19 PROCEDURE — 44970 LAPAROSCOPY APPENDECTOMY: CPT | Performed by: SURGERY

## 2021-10-19 PROCEDURE — 77030008684 HC TU ET CUF COVD -B: Performed by: NURSE ANESTHETIST, CERTIFIED REGISTERED

## 2021-10-19 PROCEDURE — 77030040955 HC DSCTR SONICISION MEDT -H1: Performed by: SURGERY

## 2021-10-19 PROCEDURE — 77030007955 HC PCH ENDOSC SPEC J&J -B: Performed by: SURGERY

## 2021-10-19 PROCEDURE — 0DTJ4ZZ RESECTION OF APPENDIX, PERCUTANEOUS ENDOSCOPIC APPROACH: ICD-10-PCS | Performed by: SURGERY

## 2021-10-19 PROCEDURE — 74011250636 HC RX REV CODE- 250/636: Performed by: NURSE ANESTHETIST, CERTIFIED REGISTERED

## 2021-10-19 PROCEDURE — 77030038157 HC DEV PWR CNTR DISP SIGNIA COVD -C: Performed by: SURGERY

## 2021-10-19 PROCEDURE — 74011000250 HC RX REV CODE- 250: Performed by: ANESTHESIOLOGY

## 2021-10-19 PROCEDURE — 74011250637 HC RX REV CODE- 250/637: Performed by: EMERGENCY MEDICINE

## 2021-10-19 PROCEDURE — 77030002895 HC DEV VASC CLOSR COVD -B: Performed by: SURGERY

## 2021-10-19 PROCEDURE — 77030012770 HC TRCR OPT FX AMR -B: Performed by: SURGERY

## 2021-10-19 PROCEDURE — 88304 TISSUE EXAM BY PATHOLOGIST: CPT

## 2021-10-19 PROCEDURE — 77030008608 HC TRCR ENDOSC SMTH AMR -B: Performed by: SURGERY

## 2021-10-19 PROCEDURE — 87635 SARS-COV-2 COVID-19 AMP PRB: CPT

## 2021-10-19 PROCEDURE — 76010000131 HC OR TIME 2 TO 2.5 HR: Performed by: SURGERY

## 2021-10-19 PROCEDURE — 77030010507 HC ADH SKN DERMBND J&J -B: Performed by: SURGERY

## 2021-10-19 RX ORDER — SODIUM CHLORIDE 9 MG/ML
75 INJECTION, SOLUTION INTRAVENOUS CONTINUOUS
Status: DISCONTINUED | OUTPATIENT
Start: 2021-10-19 | End: 2021-10-26

## 2021-10-19 RX ORDER — ONDANSETRON 2 MG/ML
4 INJECTION INTRAMUSCULAR; INTRAVENOUS
Status: DISCONTINUED | OUTPATIENT
Start: 2021-10-19 | End: 2021-10-27 | Stop reason: HOSPADM

## 2021-10-19 RX ORDER — HYDROMORPHONE HYDROCHLORIDE 1 MG/ML
.5-1 INJECTION, SOLUTION INTRAMUSCULAR; INTRAVENOUS; SUBCUTANEOUS
Status: DISCONTINUED | OUTPATIENT
Start: 2021-10-19 | End: 2021-10-27 | Stop reason: HOSPADM

## 2021-10-19 RX ORDER — LIDOCAINE HYDROCHLORIDE AND EPINEPHRINE 15; 5 MG/ML; UG/ML
INJECTION, SOLUTION EPIDURAL AS NEEDED
Status: DISCONTINUED | OUTPATIENT
Start: 2021-10-19 | End: 2021-10-19 | Stop reason: HOSPADM

## 2021-10-19 RX ORDER — HYDROCHLOROTHIAZIDE 25 MG/1
25 TABLET ORAL DAILY
Status: DISCONTINUED | OUTPATIENT
Start: 2021-10-19 | End: 2021-10-20

## 2021-10-19 RX ORDER — PHENYLEPHRINE HCL IN 0.9% NACL 0.4MG/10ML
SYRINGE (ML) INTRAVENOUS AS NEEDED
Status: DISCONTINUED | OUTPATIENT
Start: 2021-10-19 | End: 2021-10-19 | Stop reason: HOSPADM

## 2021-10-19 RX ORDER — GLYCOPYRROLATE 0.2 MG/ML
INJECTION INTRAMUSCULAR; INTRAVENOUS AS NEEDED
Status: DISCONTINUED | OUTPATIENT
Start: 2021-10-19 | End: 2021-10-19 | Stop reason: HOSPADM

## 2021-10-19 RX ORDER — BUTALBITAL, ACETAMINOPHEN AND CAFFEINE 50; 325; 40 MG/1; MG/1; MG/1
1 TABLET ORAL AS NEEDED
Status: DISCONTINUED | OUTPATIENT
Start: 2021-10-19 | End: 2021-10-27 | Stop reason: HOSPADM

## 2021-10-19 RX ORDER — DEXAMETHASONE SODIUM PHOSPHATE 4 MG/ML
INJECTION, SOLUTION INTRA-ARTICULAR; INTRALESIONAL; INTRAMUSCULAR; INTRAVENOUS; SOFT TISSUE AS NEEDED
Status: DISCONTINUED | OUTPATIENT
Start: 2021-10-19 | End: 2021-10-19 | Stop reason: HOSPADM

## 2021-10-19 RX ORDER — SUCCINYLCHOLINE CHLORIDE 20 MG/ML
INJECTION INTRAMUSCULAR; INTRAVENOUS AS NEEDED
Status: DISCONTINUED | OUTPATIENT
Start: 2021-10-19 | End: 2021-10-19 | Stop reason: HOSPADM

## 2021-10-19 RX ORDER — NALOXONE HYDROCHLORIDE 0.4 MG/ML
0.4 INJECTION, SOLUTION INTRAMUSCULAR; INTRAVENOUS; SUBCUTANEOUS AS NEEDED
Status: DISCONTINUED | OUTPATIENT
Start: 2021-10-19 | End: 2021-10-27 | Stop reason: HOSPADM

## 2021-10-19 RX ORDER — METOPROLOL SUCCINATE 50 MG/1
100 TABLET, EXTENDED RELEASE ORAL DAILY
Status: DISCONTINUED | OUTPATIENT
Start: 2021-10-19 | End: 2021-10-20

## 2021-10-19 RX ORDER — HYDROMORPHONE HYDROCHLORIDE 2 MG/ML
INJECTION, SOLUTION INTRAMUSCULAR; INTRAVENOUS; SUBCUTANEOUS AS NEEDED
Status: DISCONTINUED | OUTPATIENT
Start: 2021-10-19 | End: 2021-10-19 | Stop reason: HOSPADM

## 2021-10-19 RX ORDER — HYDROMORPHONE HYDROCHLORIDE 1 MG/ML
0.5 INJECTION, SOLUTION INTRAMUSCULAR; INTRAVENOUS; SUBCUTANEOUS
Status: DISCONTINUED | OUTPATIENT
Start: 2021-10-19 | End: 2021-10-19

## 2021-10-19 RX ORDER — DULOXETIN HYDROCHLORIDE 60 MG/1
60 CAPSULE, DELAYED RELEASE ORAL DAILY
Status: DISCONTINUED | OUTPATIENT
Start: 2021-10-19 | End: 2021-10-27 | Stop reason: HOSPADM

## 2021-10-19 RX ORDER — MIDAZOLAM HYDROCHLORIDE 1 MG/ML
INJECTION, SOLUTION INTRAMUSCULAR; INTRAVENOUS AS NEEDED
Status: DISCONTINUED | OUTPATIENT
Start: 2021-10-19 | End: 2021-10-19 | Stop reason: HOSPADM

## 2021-10-19 RX ORDER — LOSARTAN POTASSIUM 50 MG/1
100 TABLET ORAL DAILY
Status: DISCONTINUED | OUTPATIENT
Start: 2021-10-19 | End: 2021-10-20

## 2021-10-19 RX ORDER — ALBUMIN HUMAN 50 G/1000ML
SOLUTION INTRAVENOUS AS NEEDED
Status: DISCONTINUED | OUTPATIENT
Start: 2021-10-19 | End: 2021-10-19 | Stop reason: HOSPADM

## 2021-10-19 RX ORDER — DIPHENHYDRAMINE HYDROCHLORIDE 50 MG/ML
12.5 INJECTION, SOLUTION INTRAMUSCULAR; INTRAVENOUS
Status: ACTIVE | OUTPATIENT
Start: 2021-10-19 | End: 2021-10-20

## 2021-10-19 RX ORDER — HYDROMORPHONE HYDROCHLORIDE 1 MG/ML
1 INJECTION, SOLUTION INTRAMUSCULAR; INTRAVENOUS; SUBCUTANEOUS ONCE
Status: COMPLETED | OUTPATIENT
Start: 2021-10-19 | End: 2021-10-19

## 2021-10-19 RX ORDER — ACETAMINOPHEN 325 MG/1
650 TABLET ORAL
Status: DISCONTINUED | OUTPATIENT
Start: 2021-10-19 | End: 2021-10-27 | Stop reason: HOSPADM

## 2021-10-19 RX ORDER — PROPOFOL 10 MG/ML
INJECTION, EMULSION INTRAVENOUS AS NEEDED
Status: DISCONTINUED | OUTPATIENT
Start: 2021-10-19 | End: 2021-10-19 | Stop reason: HOSPADM

## 2021-10-19 RX ORDER — HYDROCODONE BITARTRATE AND ACETAMINOPHEN 10; 325 MG/1; MG/1
1 TABLET ORAL
Status: DISCONTINUED | OUTPATIENT
Start: 2021-10-19 | End: 2021-10-20

## 2021-10-19 RX ORDER — FENTANYL CITRATE 50 UG/ML
INJECTION, SOLUTION INTRAMUSCULAR; INTRAVENOUS AS NEEDED
Status: DISCONTINUED | OUTPATIENT
Start: 2021-10-19 | End: 2021-10-19 | Stop reason: HOSPADM

## 2021-10-19 RX ORDER — SODIUM CHLORIDE 9 MG/ML
125 INJECTION, SOLUTION INTRAVENOUS CONTINUOUS
Status: DISCONTINUED | OUTPATIENT
Start: 2021-10-19 | End: 2021-10-19

## 2021-10-19 RX ORDER — SODIUM CHLORIDE, SODIUM LACTATE, POTASSIUM CHLORIDE, CALCIUM CHLORIDE 600; 310; 30; 20 MG/100ML; MG/100ML; MG/100ML; MG/100ML
75 INJECTION, SOLUTION INTRAVENOUS CONTINUOUS
Status: DISCONTINUED | OUTPATIENT
Start: 2021-10-19 | End: 2021-10-19 | Stop reason: HOSPADM

## 2021-10-19 RX ORDER — ONDANSETRON 2 MG/ML
INJECTION INTRAMUSCULAR; INTRAVENOUS AS NEEDED
Status: DISCONTINUED | OUTPATIENT
Start: 2021-10-19 | End: 2021-10-19 | Stop reason: HOSPADM

## 2021-10-19 RX ORDER — ROCURONIUM BROMIDE 10 MG/ML
INJECTION, SOLUTION INTRAVENOUS AS NEEDED
Status: DISCONTINUED | OUTPATIENT
Start: 2021-10-19 | End: 2021-10-19 | Stop reason: HOSPADM

## 2021-10-19 RX ORDER — CEFOXITIN 2 G/1
INJECTION, POWDER, FOR SOLUTION INTRAVENOUS AS NEEDED
Status: DISCONTINUED | OUTPATIENT
Start: 2021-10-19 | End: 2021-10-19 | Stop reason: HOSPADM

## 2021-10-19 RX ORDER — POTASSIUM CHLORIDE 750 MG/1
30 TABLET, FILM COATED, EXTENDED RELEASE ORAL 2 TIMES DAILY
Status: COMPLETED | OUTPATIENT
Start: 2021-10-19 | End: 2021-10-19

## 2021-10-19 RX ORDER — POTASSIUM CHLORIDE 750 MG/1
40 TABLET, FILM COATED, EXTENDED RELEASE ORAL
Status: COMPLETED | OUTPATIENT
Start: 2021-10-19 | End: 2021-10-19

## 2021-10-19 RX ORDER — LIDOCAINE HYDROCHLORIDE 20 MG/ML
INJECTION, SOLUTION EPIDURAL; INFILTRATION; INTRACAUDAL; PERINEURAL AS NEEDED
Status: DISCONTINUED | OUTPATIENT
Start: 2021-10-19 | End: 2021-10-19 | Stop reason: HOSPADM

## 2021-10-19 RX ORDER — HYDROCODONE BITARTRATE AND ACETAMINOPHEN 5; 325 MG/1; MG/1
1 TABLET ORAL
Status: DISCONTINUED | OUTPATIENT
Start: 2021-10-19 | End: 2021-10-20

## 2021-10-19 RX ORDER — NEOSTIGMINE METHYLSULFATE 1 MG/ML
INJECTION, SOLUTION INTRAVENOUS AS NEEDED
Status: DISCONTINUED | OUTPATIENT
Start: 2021-10-19 | End: 2021-10-19 | Stop reason: HOSPADM

## 2021-10-19 RX ORDER — ROSUVASTATIN CALCIUM 10 MG/1
5 TABLET, COATED ORAL
Status: DISCONTINUED | OUTPATIENT
Start: 2021-10-19 | End: 2021-10-20

## 2021-10-19 RX ADMIN — ACETAMINOPHEN 650 MG: 325 TABLET ORAL at 14:46

## 2021-10-19 RX ADMIN — SUCCINYLCHOLINE CHLORIDE 140 MG: 20 INJECTION, SOLUTION INTRAMUSCULAR; INTRAVENOUS at 15:35

## 2021-10-19 RX ADMIN — Medication 80 MCG: at 15:42

## 2021-10-19 RX ADMIN — HYDROCODONE BITARTRATE AND ACETAMINOPHEN 1 TABLET: 10; 325 TABLET ORAL at 04:59

## 2021-10-19 RX ADMIN — NEOSTIGMINE METHYLSULFATE 2 MG: 1 INJECTION, SOLUTION INTRAVENOUS at 17:07

## 2021-10-19 RX ADMIN — PIPERACILLIN AND TAZOBACTAM 3.38 G: 3; .375 INJECTION, POWDER, LYOPHILIZED, FOR SOLUTION INTRAVENOUS at 09:17

## 2021-10-19 RX ADMIN — IOPAMIDOL 100 ML: 755 INJECTION, SOLUTION INTRAVENOUS at 00:49

## 2021-10-19 RX ADMIN — HYDROMORPHONE HYDROCHLORIDE 1 MG: 1 INJECTION, SOLUTION INTRAMUSCULAR; INTRAVENOUS; SUBCUTANEOUS at 03:52

## 2021-10-19 RX ADMIN — LIDOCAINE HYDROCHLORIDE 100 MG: 20 INJECTION, SOLUTION EPIDURAL; INFILTRATION; INTRACAUDAL; PERINEURAL at 15:35

## 2021-10-19 RX ADMIN — LOSARTAN POTASSIUM 100 MG: 50 TABLET, FILM COATED ORAL at 09:18

## 2021-10-19 RX ADMIN — ONDANSETRON 4 MG: 2 INJECTION INTRAMUSCULAR; INTRAVENOUS at 04:16

## 2021-10-19 RX ADMIN — ROCURONIUM BROMIDE 10 MG: 10 SOLUTION INTRAVENOUS at 15:35

## 2021-10-19 RX ADMIN — Medication 80 MCG: at 16:27

## 2021-10-19 RX ADMIN — Medication 120 MCG: at 15:59

## 2021-10-19 RX ADMIN — SODIUM CHLORIDE 125 ML/HR: 9 INJECTION, SOLUTION INTRAVENOUS at 05:00

## 2021-10-19 RX ADMIN — ROSUVASTATIN 5 MG: 10 TABLET, FILM COATED ORAL at 21:02

## 2021-10-19 RX ADMIN — FENTANYL CITRATE 50 MCG: 50 INJECTION, SOLUTION INTRAMUSCULAR; INTRAVENOUS at 15:35

## 2021-10-19 RX ADMIN — POTASSIUM CHLORIDE 30 MEQ: 750 TABLET, FILM COATED, EXTENDED RELEASE ORAL at 18:58

## 2021-10-19 RX ADMIN — CEFOXITIN SODIUM 2 G: 2 POWDER, FOR SOLUTION INTRAVENOUS at 15:47

## 2021-10-19 RX ADMIN — Medication 40 MCG: at 17:03

## 2021-10-19 RX ADMIN — GLYCOPYRROLATE 0.4 MG: 0.2 INJECTION, SOLUTION INTRAMUSCULAR; INTRAVENOUS at 17:07

## 2021-10-19 RX ADMIN — ONDANSETRON 4 MG: 2 INJECTION INTRAMUSCULAR; INTRAVENOUS at 09:10

## 2021-10-19 RX ADMIN — Medication 40 MCG: at 16:50

## 2021-10-19 RX ADMIN — ROCURONIUM BROMIDE 5 MG: 10 SOLUTION INTRAVENOUS at 16:45

## 2021-10-19 RX ADMIN — FENTANYL CITRATE 25 MCG: 50 INJECTION, SOLUTION INTRAMUSCULAR; INTRAVENOUS at 16:24

## 2021-10-19 RX ADMIN — HYDROMORPHONE HYDROCHLORIDE 1 MG: 1 INJECTION, SOLUTION INTRAMUSCULAR; INTRAVENOUS; SUBCUTANEOUS at 09:10

## 2021-10-19 RX ADMIN — Medication 40 MCG: at 17:08

## 2021-10-19 RX ADMIN — Medication 40 MCG: at 16:55

## 2021-10-19 RX ADMIN — ONDANSETRON HYDROCHLORIDE 4 MG: 2 INJECTION, SOLUTION INTRAMUSCULAR; INTRAVENOUS at 16:24

## 2021-10-19 RX ADMIN — HYDROMORPHONE HYDROCHLORIDE 0.2 MG: 2 INJECTION, SOLUTION INTRAMUSCULAR; INTRAVENOUS; SUBCUTANEOUS at 17:09

## 2021-10-19 RX ADMIN — DULOXETINE HYDROCHLORIDE 60 MG: 60 CAPSULE, DELAYED RELEASE ORAL at 09:17

## 2021-10-19 RX ADMIN — Medication 80 MCG: at 16:30

## 2021-10-19 RX ADMIN — HYDROMORPHONE HYDROCHLORIDE 1 MG: 1 INJECTION, SOLUTION INTRAMUSCULAR; INTRAVENOUS; SUBCUTANEOUS at 07:08

## 2021-10-19 RX ADMIN — SODIUM CHLORIDE 150 ML/HR: 9 INJECTION, SOLUTION INTRAVENOUS at 17:51

## 2021-10-19 RX ADMIN — Medication 40 MCG: at 16:59

## 2021-10-19 RX ADMIN — Medication 40 MCG: at 16:45

## 2021-10-19 RX ADMIN — Medication 40 MCG: at 16:40

## 2021-10-19 RX ADMIN — HYDROMORPHONE HYDROCHLORIDE 1 MG: 1 INJECTION, SOLUTION INTRAMUSCULAR; INTRAVENOUS; SUBCUTANEOUS at 13:40

## 2021-10-19 RX ADMIN — ROCURONIUM BROMIDE 20 MG: 10 SOLUTION INTRAVENOUS at 15:45

## 2021-10-19 RX ADMIN — MIDAZOLAM 2 MG: 1 INJECTION INTRAMUSCULAR; INTRAVENOUS at 15:26

## 2021-10-19 RX ADMIN — ALBUMIN (HUMAN) 250 ML: 12.5 INJECTION, SOLUTION INTRAVENOUS at 16:38

## 2021-10-19 RX ADMIN — ONDANSETRON 4 MG: 2 INJECTION INTRAMUSCULAR; INTRAVENOUS at 13:40

## 2021-10-19 RX ADMIN — Medication 120 MCG: at 15:50

## 2021-10-19 RX ADMIN — ALBUMIN (HUMAN) 250 ML: 12.5 INJECTION, SOLUTION INTRAVENOUS at 15:54

## 2021-10-19 RX ADMIN — POTASSIUM CHLORIDE 40 MEQ: 750 TABLET, FILM COATED, EXTENDED RELEASE ORAL at 03:53

## 2021-10-19 RX ADMIN — SODIUM CHLORIDE, POTASSIUM CHLORIDE, SODIUM LACTATE AND CALCIUM CHLORIDE 75 ML/HR: 600; 310; 30; 20 INJECTION, SOLUTION INTRAVENOUS at 14:53

## 2021-10-19 RX ADMIN — Medication 80 MCG: at 15:44

## 2021-10-19 RX ADMIN — POTASSIUM CHLORIDE 30 MEQ: 750 TABLET, FILM COATED, EXTENDED RELEASE ORAL at 09:17

## 2021-10-19 RX ADMIN — HYDROMORPHONE HYDROCHLORIDE 1 MG: 1 INJECTION, SOLUTION INTRAMUSCULAR; INTRAVENOUS; SUBCUTANEOUS at 20:58

## 2021-10-19 RX ADMIN — PIPERACILLIN AND TAZOBACTAM 3.38 G: 3; .375 INJECTION, POWDER, LYOPHILIZED, FOR SOLUTION INTRAVENOUS at 18:58

## 2021-10-19 RX ADMIN — Medication 40 MCG: at 17:12

## 2021-10-19 RX ADMIN — PROPOFOL 170 MG: 10 INJECTION, EMULSION INTRAVENOUS at 15:35

## 2021-10-19 RX ADMIN — Medication 120 MCG: at 15:47

## 2021-10-19 RX ADMIN — DEXMEDETOMIDINE HYDROCHLORIDE 4 MCG: 100 INJECTION, SOLUTION, CONCENTRATE INTRAVENOUS at 16:24

## 2021-10-19 RX ADMIN — HYDROCHLOROTHIAZIDE 25 MG: 25 TABLET ORAL at 09:18

## 2021-10-19 RX ADMIN — METOPROLOL SUCCINATE 100 MG: 50 TABLET, EXTENDED RELEASE ORAL at 09:17

## 2021-10-19 RX ADMIN — DEXAMETHASONE SODIUM PHOSPHATE 4 MG: 4 INJECTION, SOLUTION INTRAMUSCULAR; INTRAVENOUS at 15:47

## 2021-10-19 RX ADMIN — ROCURONIUM BROMIDE 10 MG: 10 SOLUTION INTRAVENOUS at 16:17

## 2021-10-19 RX ADMIN — PIPERACILLIN AND TAZOBACTAM 3.38 G: 3; .375 INJECTION, POWDER, LYOPHILIZED, FOR SOLUTION INTRAVENOUS at 03:50

## 2021-10-19 NOTE — H&P
Surgery History and Physical    Subjective:      Elena Bryant is a 45 y.o. female who presents to [facility] emergency room, for evaluation of abdominal pain that began 2020 1 in the morning. I was called around 1:30 AM 2021 for below, patient transferred to [facility] arriving around 5-5:30 AM, 2021. She has a history of kidney stones and she thought this was originally kidney stone pain but the pain is localized to the right lower abdomen, labs and CT scan below with elevated white blood cell count of 17,000, CT scan suggested some periappendiceal fluid and appendicolith and small air bubbles consistent with perforation of the appendix. Patient has not had any history of GI or urinary symptoms, rapid Covid is negative. She has a history of hypertension. Also has history of  section. Also has history of anxiety disorder.     LAB REVIEW:    Recent Results (from the past 48 hour(s))   URINALYSIS W/ REFLEX CULTURE    Collection Time: 10/18/21 11:16 PM    Specimen: Urine   Result Value Ref Range    Color YELLOW/STRAW      Appearance CLEAR CLEAR      Specific gravity 1.010 1.003 - 1.030      pH (UA) 7.0 5.0 - 8.0      Protein Negative NEG mg/dL    Glucose Negative NEG mg/dL    Ketone Negative NEG mg/dL    Bilirubin Negative NEG      Blood TRACE (A) NEG      Urobilinogen 0.2 0.2 - 1.0 EU/dL    Nitrites Negative NEG      Leukocyte Esterase Negative NEG      WBC 0-4 0 - 4 /hpf    RBC 5-10 0 - 5 /hpf    Epithelial cells FEW FEW /lpf    Bacteria Negative NEG /hpf    UA:UC IF INDICATED CULTURE NOT INDICATED BY UA RESULT     CBC WITH AUTOMATED DIFF    Collection Time: 10/18/21 11:16 PM   Result Value Ref Range    WBC 16.9 (H) 3.6 - 11.0 K/uL    RBC 4.40 3.80 - 5.20 M/uL    HGB 13.2 11.5 - 16.0 g/dL    HCT 40.3 35.0 - 47.0 %    MCV 91.6 80.0 - 99.0 FL    MCH 30.0 26.0 - 34.0 PG    MCHC 32.8 30.0 - 36.5 g/dL    RDW 13.1 11.5 - 14.5 %    PLATELET 228 705 - 740 K/uL    MPV 10.4 8.9 - 12.9 FL    NRBC 0.0 0  WBC    ABSOLUTE NRBC 0.00 0.00 - 0.01 K/uL    NEUTROPHILS 75 32 - 75 %    LYMPHOCYTES 16 12 - 49 %    MONOCYTES 8 5 - 13 %    EOSINOPHILS 0 0 - 7 %    BASOPHILS 0 0 - 1 %    IMMATURE GRANULOCYTES 1 (H) 0.0 - 0.5 %    ABS. NEUTROPHILS 12.5 (H) 1.8 - 8.0 K/UL    ABS. LYMPHOCYTES 2.8 0.8 - 3.5 K/UL    ABS. MONOCYTES 1.3 (H) 0.0 - 1.0 K/UL    ABS. EOSINOPHILS 0.1 0.0 - 0.4 K/UL    ABS. BASOPHILS 0.1 0.0 - 0.1 K/UL    ABS. IMM. GRANS. 0.2 (H) 0.00 - 0.04 K/UL    DF AUTOMATED     METABOLIC PANEL, COMPREHENSIVE    Collection Time: 10/18/21 11:16 PM   Result Value Ref Range    Sodium 140 136 - 145 mmol/L    Potassium 2.9 (L) 3.5 - 5.1 mmol/L    Chloride 99 97 - 108 mmol/L    CO2 28 21 - 32 mmol/L    Anion gap 13 5 - 15 mmol/L    Glucose 124 (H) 65 - 100 mg/dL    BUN 17 6 - 20 MG/DL    Creatinine 0.90 0.55 - 1.02 MG/DL    BUN/Creatinine ratio 19 12 - 20      GFR est AA >60 >60 ml/min/1.73m2    GFR est non-AA >60 >60 ml/min/1.73m2    Calcium 10.0 8.5 - 10.1 MG/DL    Bilirubin, total 0.6 0.2 - 1.0 MG/DL    ALT (SGPT) 32 12 - 78 U/L    AST (SGOT) 21 15 - 37 U/L    Alk. phosphatase 121 (H) 45 - 117 U/L    Protein, total 8.4 (H) 6.4 - 8.2 g/dL    Albumin 3.9 3.5 - 5.0 g/dL    Globulin 4.5 (H) 2.0 - 4.0 g/dL    A-G Ratio 0.9 (L) 1.1 - 2.2     LIPASE    Collection Time: 10/18/21 11:16 PM   Result Value Ref Range    Lipase 65 (L) 73 - 393 U/L   MAGNESIUM    Collection Time: 10/18/21 11:16 PM   Result Value Ref Range    Magnesium 1.7 1.6 - 2.4 mg/dL   HCG URINE, QL. - POC    Collection Time: 10/18/21 11:18 PM   Result Value Ref Range    Pregnancy test,urine (POC) Negative NEG     COVID-19 RAPID TEST    Collection Time: 10/19/21  1:53 AM   Result Value Ref Range    Specimen source Nasopharyngeal      COVID-19 rapid test Not detected NOTD         XR Results (maximum last 3): No results found for this or any previous visit. CT Results (maximum last 3):   Results from [facility] encounter on 10/18/21    CT ABD PELV W CONT    Impression  1. Findings of acute appendicitis with contained perforation. A tubular  appendicolith or other enteric contents is noted at the appendiceal base  measuring 2.2 cm in length. No abscess or drainable collection. 2. Hepatic steatosis. Results from Abstract encounter on 20    CT CARDIAC SCORE LTD      MRI Results (maximum last 3): Results from [place] encounter on 10/14/21    MRI BRAIN W WO CONT    Impression  Normal MRI of the head. Nuclear Medicine Results (maximum last 3): No results found for this or any previous visit. US Results (maximum last 3): No results found for this or any previous visit. Past Medical History:   Diagnosis Date    Anxiety and depression     HTN (hypertension)     Migraines      Past Surgical History:   Procedure Laterality Date    HX  SECTION      HX DILATION AND CURETTAGE      HX ORTHOPAEDIC  2010    titanium conchita, cervical spine      Family History   Problem Relation Age of Onset    Stroke Mother     Cancer Mother         brain tumor    Heart Disease Father     Prostate Cancer Father     Hypertension Sister     Heart Attack Sister     Alzheimer Maternal Grandmother     Heart Attack Maternal Grandmother     Hypertension Maternal Grandmother     Lung Cancer Paternal Grandmother     Other Paternal Grandfather         cirrhosis      Social History     Tobacco Use    Smoking status: Never Smoker    Smokeless tobacco: Never Used   Substance Use Topics    Alcohol use: Yes     Comment: 2-3 glasses of wine/month      Prior to Admission medications    Medication Sig Start Date End Date Taking? Authorizing Provider   predniSONE (DELTASONE) 10 mg tablet Take 10 mg by mouth daily (with breakfast).  Take 1 tab p.o. tidx3 days, 1 tab p.o. bid x4 days, 1 tab p.o. every day x3 days 10/1/21   Louie Schmitt MD   Nurtec ODT 75 mg disintegrating tablet 1 tab p.o.qod, then 1 p.o. prn for severe headache 10/1/21   Louie Schmitt MD   olmesartan-hydroCHLOROthiazide (BENICAR HCT) 40-25 mg per tablet TAKE 1 TABLET BY MOUTH EVERY DAY 21   Claudell Bender, MD   rosuvastatin (CRESTOR) 5 mg tablet Take 1 Tablet by mouth nightly. 21   Claudell Bender, MD   butalbital-acetaminophen-caffeine (FIORICET, ESGIC) -40 mg per tablet Take 1 Tab by mouth as needed for Migraine. 21   Provider, Historical   metoprolol succinate (TOPROL-XL) 100 mg tablet Take 1 Tab by mouth daily. 21   Claudell Bender, MD   DULoxetine (CYMBALTA) 60 mg capsule TAKE 1 CAPSULE BY MOUTH EVERY DAY (60mg) 20   Kaycee Trinidad NP   magnesium 250 mg tab Take 500 mg by mouth. Provider, Historical   Emgality Pen 120 mg/mL injection INJECT 1ML SUBCUTANEOUSLY EVERY MONTH 20   Provider, Historical   tiZANidine (ZANAFLEX) 4 mg tablet TAKE 1 2 TABLETS BY MOUTH AT BEDTIME 20   Provider, Historical      Allergies   Allergen Reactions    Latex Hives, Itching and Other (comments)     Swelling at contact areas    Pb Swelling    Bactrim [Sulfamethoprim] Hives       Review of Systems   HENT: Negative. Eyes: Negative. Respiratory: Negative. Gastrointestinal: Positive for abdominal pain. Musculoskeletal: Negative. All other systems reviewed and are negative. Objective:     Patient Vitals for the past 8 hrs:   BP Temp Pulse Resp SpO2 Height   10/19/21 0645      5' 6.93\" (1.7 m)   10/19/21 0457 119/81 97.7 °F (36.5 °C) 76 15 97 %    10/19/21 0353 126/74        10/19/21 0346     97 %    10/19/21 0333     92 %    10/18/21 2338 (!) 148/82 97.8 °F (36.6 °C) 73 18 99 %    10/18/21 2334     98 %    10/18/21 2302 (!) 148/83    98 %        Temp (24hrs), Av.8 °F (36.6 °C), Min:97.7 °F (36.5 °C), Max:97.8 °F (36.6 °C)      Physical Exam  Vitals and nursing note reviewed.    Constitutional:       Comments: Patient was originally in a deep sleep when I entered the room for exam this morning and was somewhat difficult to awaken but once awakened she expressed difficulty rolling from side to back and was very tearful. HENT:      Mouth/Throat:      Pharynx: Oropharynx is clear. Cardiovascular:      Rate and Rhythm: Normal rate. Pulmonary:      Effort: Pulmonary effort is normal.   Abdominal:      General: Abdomen is flat. Palpations: Abdomen is soft. Comments: Objective but as well as subjective diffuse tenderness right lower abdomen maximally but also left lower abdomen and some in the right upper abdomen with localized percussion tenderness in the right lower abdomen, difficult to assess whether diffuse peritonitis or subjective pain   Musculoskeletal:         General: Normal range of motion. Skin:     General: Skin is warm and dry. Neurological:      General: No focal deficit present. Mental Status: She is alert and oriented to person, place, and time. Comments: Patient very tearful seems very anxious   Psychiatric:         Thought Content: Thought content normal.         Judgment: Judgment normal.         Assessment:     Active Problems:    Migraines ()      Anxiety and depression ()      Mitral valve prolapse (8/18/2020)      Appendicitis (10/19/2021)      Hypertension (10/19/2021)        Plan:     1. I recommend proceeding with laparoscopy and appendectomy, patient on our schedule with Dr. Nayan Cantu and partners later this morning, patient is aware of options and potential need for antibiotics alone but a higher failure rate. If this had a localizable abscess would consider percutaneous drainage first but with her exam findings and CT findings likely will need laparoscopy, washout, appendectomy if possible versus delayed procedure.     2. Discussed aspects of surgical intervention, methods, risks including by not limited to infection, bleeding, hematoma, and perforation of the intestines or solid organs,  reoperation, delayed recovery, generalized morbidity mortality and operative findings and potentially more extensive surgery,,  and other risk not limited to above,  and the risks of general anesthetic. The patient understands the risks; any and all questions were answered to the patient's satisfaction and pt concurred    FACE TO FACE TIME: (Review of imaging, hx, records, EMR, discussion with pt and providers, and  pt exam)                                   52 minutes    Signed By: Marie Mcneill MD   [facility]    October 19, 2021         I have independently examined the patient and have reviewed the chart. I agree with the above plan. She is having a little more abdominal pain now which is now more diffuse. We will proceed forward with laparoscopic appendectomy. She appears to have some perforation and I have some concern for increasing peritonitis with her pain increasing and her tachycardia. She will remain NPO on IV abx. I have discussed the above procedure with the patient in detail. We reviewed the benefits and possible complications of the surgery which include bleeding, infection, damage to adjacent organs, venous thromboembolism, need for repeat surgery, death and other unforseen complications. The patient agreed to proceed with the surgery.         Marck Pablo MD

## 2021-10-19 NOTE — ANESTHESIA POSTPROCEDURE EVALUATION
Procedure(s):  APPENDECTOMY LAPAROSCOPIC. general    <BSHSIANPOST>    INITIAL Post-op Vital signs:   Vitals Value Taken Time   /62 10/19/21 1744   Temp 37.6 °C (99.7 °F) 10/19/21 1744   Pulse 98 10/19/21 1745   Resp 19 10/19/21 1745   SpO2 94 % 10/19/21 1745   Vitals shown include unvalidated device data.

## 2021-10-19 NOTE — PROGRESS NOTES
Pt is a 44 yo female, per [place] ER staff, 24 hr hx abd pain RLQ now, and CT with ? Early perforated appendicitis and appendicolith. WBC 17 k,urine preg neg. RAPID COVID test pnd.  IV Zosyn, transfer to [facility] for surgical evaluation and consideration appendectomy  Dr Perri Burton later this a.m. Orders written.

## 2021-10-19 NOTE — PROGRESS NOTES
Patient arrived via EMT from [facility]. She has remained NPO for procedure in AM. Complains of pain to abdomen 9/10. Medicated with PRN [place] per MAR. Bed is in lowest position and call bell within reach. Unable to retrieve labs, per patient will try again later right before shift change. Will continue to monitor.

## 2021-10-19 NOTE — BRIEF OP NOTE
Brief Postoperative Note    Patient: Rachel Flood  YOB: 1970  MRN: [id number]    Date of Procedure: 10/19/2021     Pre-Op Diagnosis: Appendicitis    Post-Op Diagnosis: Same as preoperative diagnosis. Procedure(s):  APPENDECTOMY LAPAROSCOPIC    Surgeon(s):  Peyton Griffin MD    Surgical Assistant: Surg Asst-1: Kaveh Delgado    Anesthesia: General     Estimated Blood Loss (mL): less than 50     Complications: None    Specimens:   ID Type Source Tests Collected by Time Destination   1 : appendix  Fresh Abdomen  Peyton Griffin MD 10/19/2021 1637 Pathology        Implants: * No implants in log *    Drains:   Deshawn-Landers Drain 10/19/21 Mid Abdomen (Active)   Site Assessment Clean, dry, & intact 10/19/21 1711   Dressing Status Clean, dry, & intact 10/19/21 1711   Status Patent; Charged;Draining 10/19/21 1711   Drainage Color Serosanguinous 10/19/21 1711       Findings: diffuse peritonitis in the lower abdomen, no enteric contents but purulent fluid in the right lower abdomen, perforation near the base of the appendix, appendix divided at the base with stapler and epiploic fat suture over the staple line, NIMISHA in RLQ and pelvis    Electronically Signed by Ally Contreras MD on 10/19/2021 at 5:17 PM

## 2021-10-19 NOTE — ROUTINE PROCESS
Patient: Alanna Delarosa MRN: [id number]  SSN: [id number]   YOB: 1970  Age: 45 y.o. Sex: female     Patient is status post Procedure(s):  APPENDECTOMY LAPAROSCOPIC. Surgeon(s) and Role:     Mukund Rodriguez MD - Primary    Local/Dose/Irrigation:  30 mL lidocaine 1.5% with epi 1:200,000                   Peripheral IV 10/18/21 Anterior;Distal;Right Forearm (Active)   Site Assessment Clean, dry, & intact 10/19/21 0910   Phlebitis Assessment 0 10/19/21 0910   Infiltration Assessment 0 10/19/21 0910   Dressing Status Clean, dry, & intact 10/19/21 0910   Dressing Type Transparent;Tape 10/19/21 0910   Hub Color/Line Status Green; Infusing 10/19/21 0910   Action Taken Open ports on tubing capped 10/19/21 0910   Alcohol Cap Used Yes 10/19/21 0910          Deshawn-Landers Drain 10/19/21 Mid Abdomen (Active)   Site Assessment Clean, dry, & intact 10/19/21 1711   Dressing Status Clean, dry, & intact 10/19/21 1711   Status Patent; Charged;Draining 10/19/21 1711   Drainage Color Serosanguinous 10/19/21 1711         Dressing/Packing:  Incision 10/19/21 Abdomen-Dressing/Treatment: Surgical glue (10/19/21 1712)    Splint/Cast:  ]

## 2021-10-19 NOTE — PROGRESS NOTES
TRANSFER - OUT REPORT:    Verbal report given to [facility] RN(name) on Sergei Rizzo  being transferred to [facility](unit) for routine progression of care       Report consisted of patients Situation, Background, Assessment and   Recommendations(SBAR). Information from the following report(s) SBAR, Kardex, Procedure Summary, Intake/Output, MAR and Recent Results was reviewed with the receiving nurse. Lines:   Peripheral IV 10/18/21 Anterior;Distal;Right Forearm (Active)   Site Assessment Clean, dry, & intact 10/19/21 0910   Phlebitis Assessment 0 10/19/21 0910   Infiltration Assessment 0 10/19/21 0910   Dressing Status Clean, dry, & intact 10/19/21 0910   Dressing Type Transparent;Tape 10/19/21 0910   Hub Color/Line Status Green; Infusing 10/19/21 0910   Action Taken Open ports on tubing capped 10/19/21 0910   Alcohol Cap Used Yes 10/19/21 0910        Opportunity for questions and clarification was provided. Patient transported from PACU.

## 2021-10-19 NOTE — PROGRESS NOTES
RUR: Observation     VANI: Home with . Likely no DME or HH needs. Patient's  to transport home once medically stable. Follow up with PCP/specialist.     Per General Surgery Physician note, plan is to move forward with laparoscopic appendectomy. Observation notice provided in writing to patient and/or caregiver as well as verbal explanation of the policy. Patients who are in outpatient status also receive the Observation notice. Patient has received notice and or patient representative has received via secure email, fax, or certified mail based on patient representative's preference.      Fredi Cantu, MARYANNE   [phone]

## 2021-10-19 NOTE — PERIOP NOTES
TRANSFER - IN REPORT:    Verbal report received from [facility] RN(name) on Andrea Barges  being received from [facility]E(unit) for ordered procedure      Report consisted of patients Situation, Background, Assessment and   Recommendations(SBAR). Information from the following report(s) SBAR and Kardex was reviewed with the receiving nurse. Opportunity for questions and clarification was provided. Assessment completed upon patients arrival to unit and care assumed.

## 2021-10-19 NOTE — ED PROVIDER NOTES
Date of Service:  10/18/2021    Patient:  Elizabeth Strong    Chief Complaint:  Abdominal Pain       HPI:  Elizabeth Strong is a 45 y.o.  female who presents for evaluation of abdominal pain. Onset of right lower quadrant pain early this morning. Multiple episodes of vomiting and dry heaving throughout the day. No dysuria hematuria. Patient continues abdominal complaints. No history of abdominal surgeries. No chest pain or shortness of breath. Pain continues to get worse despite what she tries to do to alleviate the discomfort.   No other acute complaints           Past Medical History:   Diagnosis Date    Anxiety and depression     HTN (hypertension)     Migraines        Past Surgical History:   Procedure Laterality Date    HX  SECTION      HX DILATION AND CURETTAGE      HX ORTHOPAEDIC  2010    titanium conchita, cervical spine         Family History:   Problem Relation Age of Onset    Stroke Mother     Cancer Mother         brain tumor    Heart Disease Father     Prostate Cancer Father     Hypertension Sister     Heart Attack Sister     Alzheimer Maternal Grandmother     Heart Attack Maternal Grandmother     Hypertension Maternal Grandmother     Lung Cancer Paternal Grandmother     Other Paternal Grandfather         cirrhosis        Social History     Socioeconomic History    Marital status:      Spouse name: Not on file    Number of children: Not on file    Years of education: Not on file    Highest education level: Not on file   Occupational History    Not on file   Tobacco Use    Smoking status: Never Smoker    Smokeless tobacco: Never Used   Substance and Sexual Activity    Alcohol use: Yes     Comment: 2-3 glasses of wine/month    Drug use: Never    Sexual activity: Not on file   Other Topics Concern    Not on file   Social History Narrative    Not on file     Social Determinants of Health     Financial Resource Strain:     Difficulty of Paying Living Expenses: Food Insecurity:     Worried About Running Out of Food in the Last Year:     [address] in the Last Year:    Transportation Needs:     Lack of Transportation (Medical):  Lack of Transportation (Non-Medical):    Physical Activity:     Days of Exercise per Week:     Minutes of Exercise per Session:    Stress:     Feeling of Stress :    Social Connections:     Frequency of Communication with Friends and Family:     Frequency of Social Gatherings with Friends and Family:     Attends Sikh Services:     Active Member of Clubs or Organizations:     Attends Club or Organization Meetings:     Marital Status:    Intimate Partner Violence:     Fear of Current or Ex-Partner:     Emotionally Abused:     Physically Abused:     Sexually Abused: ALLERGIES: Latex, Swisher, and Bactrim [sulfamethoprim]    Review of Systems   Constitutional: Negative for fever. HENT: Negative for hearing loss. Eyes: Negative for visual disturbance. Respiratory: Negative for shortness of breath. Cardiovascular: Negative for chest pain. Gastrointestinal: Positive for abdominal pain, nausea and vomiting. Negative for diarrhea. Genitourinary: Negative for flank pain. Musculoskeletal: Negative for back pain. Skin: Negative for rash. Neurological: Negative for dizziness and light-headedness. Psychiatric/Behavioral: Negative for confusion. There were no vitals filed for this visit. Physical Exam  Constitutional:       General: She is not in acute distress. Appearance: She is well-developed. HENT:      Head: Normocephalic and atraumatic. Eyes:      General: No scleral icterus. Extraocular Movements: Extraocular movements intact. Conjunctiva/sclera: Conjunctivae normal.   Neck:      Vascular: No JVD. Trachea: No tracheal deviation. Cardiovascular:      Rate and Rhythm: Normal rate and regular rhythm.    Pulmonary:      Effort: Pulmonary effort is normal. No respiratory distress. Abdominal:      General: Abdomen is flat. Bowel sounds are normal.      Palpations: Abdomen is soft. Tenderness: There is abdominal tenderness. There is guarding and rebound. Positive signs include McBurney's sign, psoas sign and obturator sign. Negative signs include Guido's sign. Musculoskeletal:         General: No tenderness or deformity. Normal range of motion. Cervical back: Neck supple. Skin:     General: Skin is warm. Capillary Refill: Capillary refill takes less than 2 seconds. Findings: No rash. Neurological:      Mental Status: She is alert and oriented to person, place, and time. Psychiatric:         Behavior: Behavior normal.         Thought Content:  Thought content normal.         Judgment: Judgment normal.          [facility]  ED Course as of Oct 19 0334   Mon Oct 18, 2021   2338 WBC(!): 16.9 [GG]   Tue Oct 19, 2021   0118 Oral K+ per Dr. Sarai Schmitt    [GG]   1093 COVID-19 rapid test: Not detected [GG]      ED Course User Index  [GG] Noah Leisure, DO     VITAL SIGNS:  Patient Vitals for the past 4 hrs:   Temp Pulse Resp BP SpO2   10/18/21 2338 97.8 °F (36.6 °C) 73 18 (!) 148/82 99 %         LABS:  Recent Results (from the past 6 hour(s))   URINALYSIS W/ REFLEX CULTURE    Collection Time: 10/18/21 11:16 PM    Specimen: Urine   Result Value Ref Range    Color YELLOW/STRAW      Appearance CLEAR CLEAR      Specific gravity 1.010 1.003 - 1.030      pH (UA) 7.0 5.0 - 8.0      Protein Negative NEG mg/dL    Glucose Negative NEG mg/dL    Ketone Negative NEG mg/dL    Bilirubin Negative NEG      Blood TRACE (A) NEG      Urobilinogen 0.2 0.2 - 1.0 EU/dL    Nitrites Negative NEG      Leukocyte Esterase Negative NEG      WBC 0-4 0 - 4 /hpf    RBC 5-10 0 - 5 /hpf    Epithelial cells FEW FEW /lpf    Bacteria Negative NEG /hpf    UA:UC IF INDICATED CULTURE NOT INDICATED BY UA RESULT     CBC WITH AUTOMATED DIFF    Collection Time: 10/18/21 11:16 PM   Result Value Ref Range WBC 16.9 (H) 3.6 - 11.0 K/uL    RBC 4.40 3.80 - 5.20 M/uL    HGB 13.2 11.5 - 16.0 g/dL    HCT 40.3 35.0 - 47.0 %    MCV 91.6 80.0 - 99.0 FL    MCH 30.0 26.0 - 34.0 PG    MCHC 32.8 30.0 - 36.5 g/dL    RDW 13.1 11.5 - 14.5 %    PLATELET 680 238 - 902 K/uL    MPV 10.4 8.9 - 12.9 FL    NRBC 0.0 0  WBC    ABSOLUTE NRBC 0.00 0.00 - 0.01 K/uL    NEUTROPHILS 75 32 - 75 %    LYMPHOCYTES 16 12 - 49 %    MONOCYTES 8 5 - 13 %    EOSINOPHILS 0 0 - 7 %    BASOPHILS 0 0 - 1 %    IMMATURE GRANULOCYTES 1 (H) 0.0 - 0.5 %    ABS. NEUTROPHILS 12.5 (H) 1.8 - 8.0 K/UL    ABS. LYMPHOCYTES 2.8 0.8 - 3.5 K/UL    ABS. MONOCYTES 1.3 (H) 0.0 - 1.0 K/UL    ABS. EOSINOPHILS 0.1 0.0 - 0.4 K/UL    ABS. BASOPHILS 0.1 0.0 - 0.1 K/UL    ABS. IMM. GRANS. 0.2 (H) 0.00 - 0.04 K/UL    DF AUTOMATED     METABOLIC PANEL, COMPREHENSIVE    Collection Time: 10/18/21 11:16 PM   Result Value Ref Range    Sodium 140 136 - 145 mmol/L    Potassium 2.9 (L) 3.5 - 5.1 mmol/L    Chloride 99 97 - 108 mmol/L    CO2 28 21 - 32 mmol/L    Anion gap 13 5 - 15 mmol/L    Glucose 124 (H) 65 - 100 mg/dL    BUN 17 6 - 20 MG/DL    Creatinine 0.90 0.55 - 1.02 MG/DL    BUN/Creatinine ratio 19 12 - 20      GFR est AA >60 >60 ml/min/1.73m2    GFR est non-AA >60 >60 ml/min/1.73m2    Calcium 10.0 8.5 - 10.1 MG/DL    Bilirubin, total 0.6 0.2 - 1.0 MG/DL    ALT (SGPT) 32 12 - 78 U/L    AST (SGOT) 21 15 - 37 U/L    Alk.  phosphatase 121 (H) 45 - 117 U/L    Protein, total 8.4 (H) 6.4 - 8.2 g/dL    Albumin 3.9 3.5 - 5.0 g/dL    Globulin 4.5 (H) 2.0 - 4.0 g/dL    A-G Ratio 0.9 (L) 1.1 - 2.2     LIPASE    Collection Time: 10/18/21 11:16 PM   Result Value Ref Range    Lipase 65 (L) 73 - 393 U/L   MAGNESIUM    Collection Time: 10/18/21 11:16 PM   Result Value Ref Range    Magnesium 1.7 1.6 - 2.4 mg/dL   HCG URINE, QL. - POC    Collection Time: 10/18/21 11:18 PM   Result Value Ref Range    Pregnancy test,urine (POC) Negative NEG     COVID-19 RAPID TEST    Collection Time: 10/19/21  1:53 AM   Result Value Ref Range    Specimen source Nasopharyngeal      COVID-19 rapid test Not detected NOTD          IMAGING:  CT ABD PELV W CONT   Final Result   1. Findings of acute appendicitis with contained perforation. A tubular   appendicolith or other enteric contents is noted at the appendiceal base   measuring 2.2 cm in length. No abscess or drainable collection. 2. Hepatic steatosis. Medications During Visit:  Medications   piperacillin-tazobactam (ZOSYN) 3.375 g in 0.9% sodium chloride (MBP/ADV) 100 mL MBP (has no administration in time range)   0.9% sodium chloride infusion (has no administration in time range)   HYDROcodone-acetaminophen (NORCO) 5-325 mg per tablet 1 Tablet (has no administration in time range)   HYDROmorphone (DILAUDID) injection 0.5 mg (has no administration in time range)   ondansetron (ZOFRAN) injection 4 mg (has no administration in time range)   acetaminophen (TYLENOL) tablet 650 mg (has no administration in time range)   HYDROcodone-acetaminophen (NORCO)  mg tablet 1 Tablet (has no administration in time range)   naloxone (NARCAN) injection 0.4 mg (has no administration in time range)   diphenhydrAMINE (BENADRYL) injection 12.5 mg (has no administration in time range)   piperacillin-tazobactam (ZOSYN) 3.375 g in 0.9% sodium chloride (MBP/ADV) 100 mL MBP (has no administration in time range)   potassium chloride SR (KLOR-CON 10) tablet 30 mEq (has no administration in time range)   butalbital-acetaminophen-caffeine (FIORICET, ESGIC) -40 mg per tablet 1 Tablet (has no administration in time range)   DULoxetine (CYMBALTA) capsule 60 mg (has no administration in time range)   metoprolol succinate (TOPROL-XL) XL tablet 100 mg (has no administration in time range)   . PHARMACY TO SUBSTITUTE PER PROTOCOL (Reordered from: olmesartan-hydroCHLOROthiazide (BENICAR HCT) 40-25 mg per tablet) (has no administration in time range)   rosuvastatin (CRESTOR) tablet 5 mg (has no administration in time range)   potassium chloride SR (KLOR-CON 10) tablet 40 mEq (has no administration in time range)   ketorolac (TORADOL) injection 30 mg (30 mg IntraVENous Given 10/18/21 2315)   ondansetron (ZOFRAN) injection 4 mg (4 mg IntraVENous Given 10/18/21 2315)   sodium chloride 0.9 % bolus infusion 1,000 mL (1,000 mL IntraVENous New Bag 10/18/21 2315)   HYDROmorphone (DILAUDID) injection 1 mg (1 mg IntraVENous Given 10/18/21 2342)   iopamidoL (ISOVUE-370) 76 % injection 100 mL (100 mL IntraVENous Given 10/19/21 0049)         DECISION MAKING:  Edwina Greene is a 45 y.o. female who comes in as above. Diagnostics as above. Patient has acute appendicitis. Admission to surgical service at [facility]. Antibiotics, n.p.o., pain relief      IMPRESSION:  1. Perforated appendicitis        DISPOSITION:  Admitted      Current Discharge Medication List           Follow-up Information    None           The patient is asked to follow-up with their primary care provider in the next several days. They are to call tomorrow for an appointment. The patient is asked to return promptly for any increased concerns or worsening of symptoms. They can return to this emergency department or any other emergency department.       Procedures

## 2021-10-19 NOTE — ROUTINE PROCESS
TRANSFER - IN REPORT:    Verbal report received from [address] (name) on Elizabeth Bis  being received from [employer] (unit) for routine post - op      Report consisted of patients Situation, Background, Assessment and   Recommendations(SBAR). Information from the following report(s) SBAR, Kardex, [facility] and Cardiac Rhythm NSR was reviewed with the receiving nurse. Opportunity for questions and clarification was provided. Assessment completed upon patients arrival to unit and care assumed.

## 2021-10-19 NOTE — PERIOP NOTES
TRANSFER - OUT REPORT:    Verbal report given to Pauly Ty RN (name) on Xiomara Moore  being transferred to  (unit) for routine post - op       Report consisted of patients Situation, Background, Assessment and   Recommendations(SBAR). Time Pre op antibiotic given: 4866  Anesthesia Stop time:  7633  Abebe Present on Transfer to floor: Yes  Order for Abebe on Chart: Yes  Discharge Prescriptions with Chart: N/A    Information from the following report(s) OR Summary, Intake/Output, MAR, Recent Results, Med Rec Status and Cardiac Rhythm ST was reviewed with the receiving nurse. Opportunity for questions and clarification was provided. Is the patient on 02? YES       L/Min 2    Is the patient on a monitor? YES    Is the nurse transporting with the patient? YES    Surgical Waiting Area notified of patient's transfer from PACU? YES      The following personal items collected during your admission accompanied patient upon transfer:   Dental Appliance: Dental Appliances: None  Vision:    Hearing Aid:    Jewelry: Jewelry: None  Clothing: Clothing:  (belonging bag with underwear sent to pacu) (returned to patient in pacu)  Other Valuables:  Other Valuables: None  Valuables sent to safe: Personal Items Sent to Safe: n/a

## 2021-10-20 LAB
ANION GAP SERPL CALC-SCNC: 7 MMOL/L (ref 5–15)
BUN SERPL-MCNC: 14 MG/DL (ref 6–20)
BUN/CREAT SERPL: 18 (ref 12–20)
CALCIUM SERPL-MCNC: 8.9 MG/DL (ref 8.5–10.1)
CHLORIDE SERPL-SCNC: 107 MMOL/L (ref 97–108)
CO2 SERPL-SCNC: 26 MMOL/L (ref 21–32)
CREAT SERPL-MCNC: 0.77 MG/DL (ref 0.55–1.02)
ERYTHROCYTE [DISTWIDTH] IN BLOOD BY AUTOMATED COUNT: 13.9 % (ref 11.5–14.5)
GLUCOSE SERPL-MCNC: 132 MG/DL (ref 65–100)
HCT VFR BLD AUTO: 35.3 % (ref 35–47)
HGB BLD-MCNC: 11.6 G/DL (ref 11.5–16)
MAGNESIUM SERPL-MCNC: 1.8 MG/DL (ref 1.6–2.4)
MCH RBC QN AUTO: 30.9 PG (ref 26–34)
MCHC RBC AUTO-ENTMCNC: 32.9 G/DL (ref 30–36.5)
MCV RBC AUTO: 93.9 FL (ref 80–99)
NRBC # BLD: 0 K/UL (ref 0–0.01)
NRBC BLD-RTO: 0 PER 100 WBC
PLATELET # BLD AUTO: 236 K/UL (ref 150–400)
PMV BLD AUTO: 10.3 FL (ref 8.9–12.9)
POTASSIUM SERPL-SCNC: 3.5 MMOL/L (ref 3.5–5.1)
RBC # BLD AUTO: 3.76 M/UL (ref 3.8–5.2)
SODIUM SERPL-SCNC: 140 MMOL/L (ref 136–145)
WBC # BLD AUTO: 13.5 K/UL (ref 3.6–11)

## 2021-10-20 PROCEDURE — 74011250636 HC RX REV CODE- 250/636: Performed by: SURGERY

## 2021-10-20 PROCEDURE — 94760 N-INVAS EAR/PLS OXIMETRY 1: CPT

## 2021-10-20 PROCEDURE — 83735 ASSAY OF MAGNESIUM: CPT

## 2021-10-20 PROCEDURE — 36415 COLL VENOUS BLD VENIPUNCTURE: CPT

## 2021-10-20 PROCEDURE — 99218 HC RM OBSERVATION: CPT

## 2021-10-20 PROCEDURE — 77010033678 HC OXYGEN DAILY

## 2021-10-20 PROCEDURE — 74011250637 HC RX REV CODE- 250/637: Performed by: SURGERY

## 2021-10-20 PROCEDURE — 74011000258 HC RX REV CODE- 258: Performed by: SURGERY

## 2021-10-20 PROCEDURE — C9113 INJ PANTOPRAZOLE SODIUM, VIA: HCPCS | Performed by: SURGERY

## 2021-10-20 PROCEDURE — 85027 COMPLETE CBC AUTOMATED: CPT

## 2021-10-20 PROCEDURE — 74011000250 HC RX REV CODE- 250: Performed by: SURGERY

## 2021-10-20 PROCEDURE — 80048 BASIC METABOLIC PNL TOTAL CA: CPT

## 2021-10-20 PROCEDURE — 96376 TX/PRO/DX INJ SAME DRUG ADON: CPT

## 2021-10-20 RX ORDER — POTASSIUM CHLORIDE 7.45 MG/ML
10 INJECTION INTRAVENOUS
Status: DISPENSED | OUTPATIENT
Start: 2021-10-20 | End: 2021-10-20

## 2021-10-20 RX ORDER — POTASSIUM CHLORIDE 7.45 MG/ML
10 INJECTION INTRAVENOUS ONCE
Status: COMPLETED | OUTPATIENT
Start: 2021-10-20 | End: 2021-10-20

## 2021-10-20 RX ORDER — KETOROLAC TROMETHAMINE 30 MG/ML
15 INJECTION, SOLUTION INTRAMUSCULAR; INTRAVENOUS EVERY 6 HOURS
Status: COMPLETED | OUTPATIENT
Start: 2021-10-20 | End: 2021-10-25

## 2021-10-20 RX ORDER — OXYCODONE AND ACETAMINOPHEN 5; 325 MG/1; MG/1
1 TABLET ORAL
Status: DISCONTINUED | OUTPATIENT
Start: 2021-10-20 | End: 2021-10-27 | Stop reason: HOSPADM

## 2021-10-20 RX ADMIN — PIPERACILLIN AND TAZOBACTAM 3.38 G: 3; .375 INJECTION, POWDER, LYOPHILIZED, FOR SOLUTION INTRAVENOUS at 00:58

## 2021-10-20 RX ADMIN — DULOXETINE HYDROCHLORIDE 60 MG: 60 CAPSULE, DELAYED RELEASE ORAL at 08:33

## 2021-10-20 RX ADMIN — HYDROMORPHONE HYDROCHLORIDE 1 MG: 1 INJECTION, SOLUTION INTRAMUSCULAR; INTRAVENOUS; SUBCUTANEOUS at 15:12

## 2021-10-20 RX ADMIN — HYDROMORPHONE HYDROCHLORIDE 1 MG: 1 INJECTION, SOLUTION INTRAMUSCULAR; INTRAVENOUS; SUBCUTANEOUS at 04:10

## 2021-10-20 RX ADMIN — PIPERACILLIN AND TAZOBACTAM 3.38 G: 3; .375 INJECTION, POWDER, LYOPHILIZED, FOR SOLUTION INTRAVENOUS at 18:10

## 2021-10-20 RX ADMIN — KETOROLAC TROMETHAMINE 15 MG: 30 INJECTION, SOLUTION INTRAMUSCULAR; INTRAVENOUS at 23:20

## 2021-10-20 RX ADMIN — KETOROLAC TROMETHAMINE 15 MG: 30 INJECTION, SOLUTION INTRAMUSCULAR; INTRAVENOUS at 12:11

## 2021-10-20 RX ADMIN — HYDROCODONE BITARTRATE AND ACETAMINOPHEN 1 TABLET: 5; 325 TABLET ORAL at 01:18

## 2021-10-20 RX ADMIN — POTASSIUM CHLORIDE 10 MEQ: 10 INJECTION, SOLUTION INTRAVENOUS at 12:11

## 2021-10-20 RX ADMIN — SODIUM CHLORIDE 125 ML/HR: 9 INJECTION, SOLUTION INTRAVENOUS at 08:20

## 2021-10-20 RX ADMIN — KETOROLAC TROMETHAMINE 15 MG: 30 INJECTION, SOLUTION INTRAMUSCULAR; INTRAVENOUS at 18:10

## 2021-10-20 RX ADMIN — SODIUM CHLORIDE 40 MG: 9 INJECTION, SOLUTION INTRAMUSCULAR; INTRAVENOUS; SUBCUTANEOUS at 08:32

## 2021-10-20 RX ADMIN — ONDANSETRON 4 MG: 2 INJECTION INTRAMUSCULAR; INTRAVENOUS at 08:19

## 2021-10-20 RX ADMIN — HYDROMORPHONE HYDROCHLORIDE 1 MG: 1 INJECTION, SOLUTION INTRAMUSCULAR; INTRAVENOUS; SUBCUTANEOUS at 08:32

## 2021-10-20 RX ADMIN — POTASSIUM CHLORIDE 10 MEQ: 7.45 INJECTION INTRAVENOUS at 08:33

## 2021-10-20 RX ADMIN — PIPERACILLIN AND TAZOBACTAM 3.38 G: 3; .375 INJECTION, POWDER, LYOPHILIZED, FOR SOLUTION INTRAVENOUS at 09:48

## 2021-10-20 NOTE — PROGRESS NOTES
0955-Abebe catheter removed; pt tolerated well. Pt due to void in 6 hours; will continue to monitor. 1200-pt made first attempt to void; up to bedside commode with assist x1.

## 2021-10-20 NOTE — PROGRESS NOTES
Bedside and Verbal shift change report given to Mary Paulson (oncoming nurse) by Cuauhtemoc Alonzo (offgoing nurse).  Report included the following information SBAR, Kardex, ED Summary, Procedure Summary, Intake/Output, MAR, Recent Results and Cardiac Rhythm ST.

## 2021-10-20 NOTE — ROUTINE PROCESS
Bedside shift change report [id number] (oncoming nurse) by Sidney Woodward (offgoing nurse).  Report included the following information SBAR, Kardex, MAR and Cardiac Rhythm ST.

## 2021-10-20 NOTE — PROGRESS NOTES
0955-Abebe catheter removed; pt tolerated well. Pt due to void in 6 hours; will continue to monitor.

## 2021-10-20 NOTE — PROGRESS NOTES
[facility] at [facility]    POD #1    Subjective     Doing well, tolerating ice, mild nausea, having pain    Objective     Patient Vitals for the past 24 hrs:   Temp Pulse Resp BP SpO2   10/20/21 0600  (!) 108      10/20/21 0400  (!) 117      10/20/21 0359 99.5 °F (37.5 °C) (!) 115 21 130/84 95 %   10/20/21 0200  (!) 112      10/19/21 2210 98.3 °F (36.8 °C) (!) 113 22 115/64 93 %   10/19/21 2027  (!) 121      10/19/21 1946 99.1 °F (37.3 °C) (!) 111 19 115/66 94 %   10/19/21 1852 100.4 °F (38 °C) (!) 111 24 109/73 95 %   10/19/21 1815 99.3 °F (37.4 °C) (!) 109 21 109/67 100 %   10/19/21 1800  (!) 108 20 101/72 94 %   10/19/21 1755  (!) 105 17 110/65 95 %   10/19/21 1750  (!) 108 25 (!) 118/55 94 %   10/19/21 1745  98 19 103/64 94 %   10/19/21 1744 99.7 °F (37.6 °C) 96 18 105/62 94 %   10/19/21 1442     97 %   10/19/21 1436 100.1 °F (37.8 °C) (!) 124 16 128/77 (!) 86 %   10/19/21 0917  (!) 120  113/73        Date 10/19/21 0700 - 10/20/21 0659 10/20/21 0700 - 10/21/21 0659   Shift 4938-9701 9575-0135 24 Hour Total 2525-7655 8872-6361 24 Hour Total   INTAKE   I.V. 1800  1800(0.7)        Volume (lactated Ringers infusion) 1300  1300        Volume (albumin human 5% (BUMINATE) solution) 500  500      Shift Total(mL/kg) 1800  1800(17.3)      OUTPUT   Urine 350 400(0.3) 750(0.3)        Urine Occurrence(s) 1 x  1 x        Urine Output 350  350        Urine Output (mL) (Urinary Catheter 10/19/21 2- way; Abebe)  400 400      Drains 50 120 170        Output (ml) (Deshawn-Landers Drain 10/19/21 Mid;Lower Abdomen) 50 120 170      Shift Total(mL/kg) 400 520(5) 920(8.9)      NET 1400 -520 880      Weight (kg)  103.9 103.9 103.9 103.9 103.9       PE  Pulm - CTAB  CV - RRR  Abd - soft, ND, BS hypo, incisions c/d/i, NIMISHA ss    Labs  Recent Results (from the past 12 hour(s))   METABOLIC PANEL, BASIC    Collection Time: 10/20/21  4:19 AM   Result Value Ref Range    Sodium 140 136 - 145 mmol/L    Potassium 3.5 3.5 - 5.1 mmol/L    Chloride 107 97 - 108 mmol/L    CO2 26 21 - 32 mmol/L    Anion gap 7 5 - 15 mmol/L    Glucose 132 (H) 65 - 100 mg/dL    BUN 14 6 - 20 MG/DL    Creatinine 0.77 0.55 - 1.02 MG/DL    BUN/Creatinine ratio 18 12 - 20      GFR est AA >60 >60 ml/min/1.73m2    GFR est non-AA >60 >60 ml/min/1.73m2    Calcium 8.9 8.5 - 10.1 MG/DL   MAGNESIUM    Collection Time: 10/20/21  4:19 AM   Result Value Ref Range    Magnesium 1.8 1.6 - 2.4 mg/dL   CBC W/O DIFF    Collection Time: 10/20/21  4:19 AM   Result Value Ref Range    WBC 13.5 (H) 3.6 - 11.0 K/uL    RBC 3.76 (L) 3.80 - 5.20 M/uL    HGB 11.6 11.5 - 16.0 g/dL    HCT 35.3 35.0 - 47.0 %    MCV 93.9 80.0 - 99.0 FL    MCH 30.9 26.0 - 34.0 PG    MCHC 32.9 30.0 - 36.5 g/dL    RDW 13.9 11.5 - 14.5 %    PLATELET 809 545 - 517 K/uL    MPV 10.3 8.9 - 12.9 FL    NRBC 0.0 0  WBC    ABSOLUTE NRBC 0.00 0.00 - 0.01 K/uL         Assessment     Xiomara Moore is a 45 y. o.yr old female s/p laparoscopic appendectomy for perforated appendicitis    Plan     Sepsis seems to have improved post op  Continue IV abx  Starting on clears today  Start toradol and PO pain meds in addition to IV pain medication   DC morgan today, UOP and Cr looking good  OOB more today  NIMISHA is clear, no signs of leak  Awaiting return of bowel function, she had a lot of peritonitis and will need some time to open up and regain bowel function    Disha Gaming MD

## 2021-10-20 NOTE — OP NOTES
[facility]  OPERATIVE REPORT    Name:  Lesa Carias  MR#:  [id number]  :  1970  ACCOUNT #:  [de-identified]  DATE OF SERVICE:  10/19/2021      PREOPERATIVE DIAGNOSIS:  Appendicitis    POSTOPERATIVE DIAGNOSIS:  Appendicitis. PROCEDURE PERFORMED:  Laparoscopic appendectomy. SURGEON:  Leta Goel MD    ASSISTANT:  Justin Puentes SA    ANESTHESIA:  General.    COMPLICATIONS:  None. SPECIMENS REMOVED:  Appendix. IMPLANTS:  None. ESTIMATED BLOOD LOSS:  Less than 50 mL. DRAINS:  A 19-Scottish Eric drain. FINDINGS:  Diffuse peritonitis in the lower abdomen, no enteric contents, but purulent fluid was present in the right lower abdomen. Perforation near the base of the appendix. The appendix was divided at the base with the stapler and epiploic fat was sutured over the staple line in multiple locations and NIMISHA drain was placed in the right lower quadrant and pelvis. INDICATIONS FOR THE OPERATION:  The patient is a 39-year-old female who has a recent history of appendicitis with what appears to be a contained perforation on CT scan that is now getting more septic and is needing laparoscopic exploration and appendectomy. DESCRIPTION OF THE OPERATION:  The patient was met in the preop holding area. The H and P was updated. Consent was signed. All risks and benefits were explained to the patient prior to the start of the operation. She was taken back to the operating room. She was lying the supine position. The abdomen was prepped and draped in standard sterile fashion. Time-out was called. Antibiotics were given. SCDs were on lower extremities. Started the operation by making a 5-mm incision into the left lower quadrant, inserting a VisiPort trocar into the intra-abdominal cavity insufflating to 15 mmHg. I then placed a 5-mm trocar supraumbilical and then a 5-mm suprapubic port and switched out to a 12-mm right lower quadrant port.   We looked down into the right lower quadrant finding diffuse peritonitis. There were a few adhesions of the omentum to the anterior abdominal wall on the lower midline which were taken down with the Sonicision device. We swept down some of the bowel adhesions to the anterior abdominal wall which were very loose and just from the inflammation. Once those were swept down, we could see diffuse peritonitis in the lower abdomen, indicating the patient had a perforation that was not contained. We placed another 5-mm trocar in the upper midline and dissected down into the right lower quadrant area, breaking up some of the adhesions of the terminal ileum to the right lower quadrant and cecum area. We were able to dissect out the ligament of Teddy Jacy and the terminal ileum. We rotated the appendix going around, going a little bit more retrocecal and we could see the appendix going down around into the right paracolic gutter. We were able to mobilize that up into the operative field and then divided the mesentery of the appendix with the Sonicision device all the way down to the base. We could see the perforation was very close to the base, maybe about a centimeter away from the base with multiple appendicoliths coming from that area which were removed from the opening where the perforation was. These appendicoliths removed off the field with a stone grasper and then dissected out the base a little bit more. We had about a centimeter of semi-viable stump of the appendix which we divided with a 30-mm tan load Signia stapler, trying to get a little bit of the cecum into that but the cecum was very thickened due to the amount of inflammation down there in that area. The staple line did appear to be sealed. We did not see any sign of any enteric contamination. There was just some purulence down in the pelvis which was washed out into the right lower quadrant, but there was no sign of any succus or any obvious stool.   To help secure the appendiceal division at the stump, we used some of the ligament of Treves and some epiploic fat and sutured it over that area with multiple interrupted 3-0 Vicryl sutures to help seal the area of the staple line and ensure that it stayed sealed. We then washed out the lower abdomen with 1000 mL of saline irrigation. All the purulence was removed. We then placed a 19-Setswana Eric drain into the pelvis through the lower suprapubic port into the right lower quadrant area and the pelvis. It was in good positioning. We then closed our 12-mm port trocar site fascial defect with an Endo Close suture passing device in interrupted fashion with 0 Vicryl suture. We looked back at our operative anatomy one more time. Everything was hemostatic. Everything looked good. A drain was in place. We then desufflated the abdominal cavity, removed the trocars and closed the skin with 4-0 Monocryl and Dermabond to complete the operation. Dr. Pawel Forrest was present and scrubbed during the entire operation. The counts were correct.         MD HUMBERTO [id number]  D:  10/19/2021 17:28  T:  10/20/2021 1:22  JOB #:  [id number]

## 2021-10-21 ENCOUNTER — APPOINTMENT (OUTPATIENT)
Dept: GENERAL RADIOLOGY | Age: 51
DRG: 339 | End: 2021-10-21
Attending: SURGERY
Payer: COMMERCIAL

## 2021-10-21 LAB
ANION GAP SERPL CALC-SCNC: 3 MMOL/L (ref 5–15)
BUN SERPL-MCNC: 16 MG/DL (ref 6–20)
BUN/CREAT SERPL: 23 (ref 12–20)
CALCIUM SERPL-MCNC: 8.6 MG/DL (ref 8.5–10.1)
CHLORIDE SERPL-SCNC: 110 MMOL/L (ref 97–108)
CO2 SERPL-SCNC: 26 MMOL/L (ref 21–32)
CREAT SERPL-MCNC: 0.7 MG/DL (ref 0.55–1.02)
ERYTHROCYTE [DISTWIDTH] IN BLOOD BY AUTOMATED COUNT: 14 % (ref 11.5–14.5)
GLUCOSE SERPL-MCNC: 99 MG/DL (ref 65–100)
HCT VFR BLD AUTO: 34 % (ref 35–47)
HGB BLD-MCNC: 10.3 G/DL (ref 11.5–16)
MAGNESIUM SERPL-MCNC: 1.9 MG/DL (ref 1.6–2.4)
MCH RBC QN AUTO: 30.4 PG (ref 26–34)
MCHC RBC AUTO-ENTMCNC: 30.3 G/DL (ref 30–36.5)
MCV RBC AUTO: 100.3 FL (ref 80–99)
NRBC # BLD: 0 K/UL (ref 0–0.01)
NRBC BLD-RTO: 0 PER 100 WBC
PHOSPHATE SERPL-MCNC: 1.2 MG/DL (ref 2.6–4.7)
PLATELET # BLD AUTO: 161 K/UL (ref 150–400)
PMV BLD AUTO: 10.3 FL (ref 8.9–12.9)
POTASSIUM SERPL-SCNC: 3.6 MMOL/L (ref 3.5–5.1)
RBC # BLD AUTO: 3.39 M/UL (ref 3.8–5.2)
SODIUM SERPL-SCNC: 139 MMOL/L (ref 136–145)
WBC # BLD AUTO: 10.9 K/UL (ref 3.6–11)

## 2021-10-21 PROCEDURE — 96375 TX/PRO/DX INJ NEW DRUG ADDON: CPT

## 2021-10-21 PROCEDURE — C9113 INJ PANTOPRAZOLE SODIUM, VIA: HCPCS | Performed by: SURGERY

## 2021-10-21 PROCEDURE — 36415 COLL VENOUS BLD VENIPUNCTURE: CPT

## 2021-10-21 PROCEDURE — 74011250637 HC RX REV CODE- 250/637: Performed by: SURGERY

## 2021-10-21 PROCEDURE — 80048 BASIC METABOLIC PNL TOTAL CA: CPT

## 2021-10-21 PROCEDURE — 74011250636 HC RX REV CODE- 250/636: Performed by: SURGERY

## 2021-10-21 PROCEDURE — 74011000250 HC RX REV CODE- 250: Performed by: SURGERY

## 2021-10-21 PROCEDURE — 96376 TX/PRO/DX INJ SAME DRUG ADON: CPT

## 2021-10-21 PROCEDURE — 74018 RADEX ABDOMEN 1 VIEW: CPT

## 2021-10-21 PROCEDURE — 83735 ASSAY OF MAGNESIUM: CPT

## 2021-10-21 PROCEDURE — 74011000258 HC RX REV CODE- 258: Performed by: SURGERY

## 2021-10-21 PROCEDURE — 99218 HC RM OBSERVATION: CPT

## 2021-10-21 PROCEDURE — 85027 COMPLETE CBC AUTOMATED: CPT

## 2021-10-21 PROCEDURE — 84100 ASSAY OF PHOSPHORUS: CPT

## 2021-10-21 RX ORDER — HYDROMORPHONE HYDROCHLORIDE 1 MG/ML
0.5 INJECTION, SOLUTION INTRAMUSCULAR; INTRAVENOUS; SUBCUTANEOUS ONCE
Status: COMPLETED | OUTPATIENT
Start: 2021-10-21 | End: 2021-10-21

## 2021-10-21 RX ORDER — HYDRALAZINE HYDROCHLORIDE 20 MG/ML
20 INJECTION INTRAMUSCULAR; INTRAVENOUS
Status: DISCONTINUED | OUTPATIENT
Start: 2021-10-21 | End: 2021-10-27 | Stop reason: HOSPADM

## 2021-10-21 RX ORDER — METOPROLOL SUCCINATE 50 MG/1
100 TABLET, EXTENDED RELEASE ORAL DAILY
Status: DISCONTINUED | OUTPATIENT
Start: 2021-10-21 | End: 2021-10-27 | Stop reason: HOSPADM

## 2021-10-21 RX ADMIN — HYDROMORPHONE HYDROCHLORIDE 0.5 MG: 1 INJECTION, SOLUTION INTRAMUSCULAR; INTRAVENOUS; SUBCUTANEOUS at 13:20

## 2021-10-21 RX ADMIN — HYDROMORPHONE HYDROCHLORIDE 1 MG: 1 INJECTION, SOLUTION INTRAMUSCULAR; INTRAVENOUS; SUBCUTANEOUS at 18:14

## 2021-10-21 RX ADMIN — HYDROMORPHONE HYDROCHLORIDE 0.5 MG: 1 INJECTION, SOLUTION INTRAMUSCULAR; INTRAVENOUS; SUBCUTANEOUS at 10:01

## 2021-10-21 RX ADMIN — ONDANSETRON 4 MG: 2 INJECTION INTRAMUSCULAR; INTRAVENOUS at 12:46

## 2021-10-21 RX ADMIN — SODIUM CHLORIDE 40 MG: 9 INJECTION, SOLUTION INTRAMUSCULAR; INTRAVENOUS; SUBCUTANEOUS at 09:09

## 2021-10-21 RX ADMIN — KETOROLAC TROMETHAMINE 15 MG: 30 INJECTION, SOLUTION INTRAMUSCULAR; INTRAVENOUS at 23:18

## 2021-10-21 RX ADMIN — PIPERACILLIN AND TAZOBACTAM 3.38 G: 3; .375 INJECTION, POWDER, LYOPHILIZED, FOR SOLUTION INTRAVENOUS at 09:09

## 2021-10-21 RX ADMIN — DULOXETINE HYDROCHLORIDE 60 MG: 60 CAPSULE, DELAYED RELEASE ORAL at 09:08

## 2021-10-21 RX ADMIN — HYDROMORPHONE HYDROCHLORIDE 0.5 MG: 1 INJECTION, SOLUTION INTRAMUSCULAR; INTRAVENOUS; SUBCUTANEOUS at 13:10

## 2021-10-21 RX ADMIN — PIPERACILLIN AND TAZOBACTAM 3.38 G: 3; .375 INJECTION, POWDER, LYOPHILIZED, FOR SOLUTION INTRAVENOUS at 18:13

## 2021-10-21 RX ADMIN — SODIUM CHLORIDE 75 ML/HR: 9 INJECTION, SOLUTION INTRAVENOUS at 09:09

## 2021-10-21 RX ADMIN — HYDRALAZINE HYDROCHLORIDE 20 MG: 20 INJECTION INTRAMUSCULAR; INTRAVENOUS at 23:40

## 2021-10-21 RX ADMIN — METOPROLOL SUCCINATE 100 MG: 50 TABLET, EXTENDED RELEASE ORAL at 14:15

## 2021-10-21 RX ADMIN — OXYCODONE HYDROCHLORIDE AND ACETAMINOPHEN 1 TABLET: 5; 325 TABLET ORAL at 09:09

## 2021-10-21 RX ADMIN — POTASSIUM PHOSPHATE, MONOBASIC AND POTASSIUM PHOSPHATE, DIBASIC: 224; 236 INJECTION, SOLUTION, CONCENTRATE INTRAVENOUS at 09:55

## 2021-10-21 RX ADMIN — KETOROLAC TROMETHAMINE 15 MG: 30 INJECTION, SOLUTION INTRAMUSCULAR; INTRAVENOUS at 05:23

## 2021-10-21 RX ADMIN — HYDROMORPHONE HYDROCHLORIDE 1 MG: 1 INJECTION, SOLUTION INTRAMUSCULAR; INTRAVENOUS; SUBCUTANEOUS at 22:12

## 2021-10-21 RX ADMIN — HYDROMORPHONE HYDROCHLORIDE 1 MG: 1 INJECTION, SOLUTION INTRAMUSCULAR; INTRAVENOUS; SUBCUTANEOUS at 03:07

## 2021-10-21 RX ADMIN — KETOROLAC TROMETHAMINE 15 MG: 30 INJECTION, SOLUTION INTRAMUSCULAR; INTRAVENOUS at 13:04

## 2021-10-21 RX ADMIN — PIPERACILLIN AND TAZOBACTAM 3.38 G: 3; .375 INJECTION, POWDER, LYOPHILIZED, FOR SOLUTION INTRAVENOUS at 02:05

## 2021-10-21 RX ADMIN — KETOROLAC TROMETHAMINE 15 MG: 30 INJECTION, SOLUTION INTRAMUSCULAR; INTRAVENOUS at 18:14

## 2021-10-21 NOTE — PROGRESS NOTES
Bedside shift change report given to Everett Perdomo RN (oncoming nurse) by DENISE Hidalgo RN (offgoing nurse).  Report included the following information SBAR, Kardex and Cardiac Rhythm SR.

## 2021-10-21 NOTE — PROGRESS NOTES
[facility] at [facility] Surgery    POD #2    Subjective     Doingwell, no fever, tolerating clears well, no N/V, no BM or flatus yet    Objective     Patient Vitals for the past 24 hrs:   Temp Pulse Resp BP SpO2   10/21/21 0633  99      10/21/21 0430  93      10/21/21 0324 98.3 °F (36.8 °C) 98 21 (!) 176/85 95 %   10/21/21 0231  84      10/21/21 0023  77      10/20/21 2324 97.9 °F (36.6 °C) 80 20 (!) 142/85 92 %   10/20/21 2238  87      10/20/21 2051 97.9 °F (36.6 °C) 86 24 (!) 154/88 96 %   10/20/21 2034  83      10/20/21 1923 98.6 °F (37 °C) 89 22 138/84 96 %   10/20/21 1547 97.4 °F (36.3 °C) 81 14 126/78 97 %   10/20/21 1511 98.9 °F (37.2 °C) 82 22 131/78 97 %   10/20/21 1410  88      10/20/21 0818 98.2 °F (36.8 °C) 98 24 130/85 94 %       Date 10/20/21 0700 - 10/21/21 0659 10/21/21 0700 - 10/22/21 0659   Shift 3551-2953 9683-7022 24 Hour Total 6796-4644 6226-4205 24 Hour Total   INTAKE   I.V.(mL/kg/hr) 2435.2(4.4)  2667.1(1.1)        Volume (0.9% sodium chloride infusion) 2167.1  2167.1        Volume (piperacillin-tazobactam (ZOSYN) 3.375 g in 0.9% sodium chloride (MBP/ADV) 100 mL MBP) 400  400        Volume (potassium chloride 10 mEq in 100 ml IVPB) 100  100      Shift Total(mL/kg) 2667. 1(25.7)  2667. 1(25.8)      OUTPUT   Urine(mL/kg/hr) 650(0.5)  650(0.3)        Urine Voided 300  300        Urine Occurrence(s)  2 x 2 x        Urine Output (mL) ([REMOVED] Urinary Catheter 10/19/21 2- way; [place]) 350  350      Drains 110 170 280        Output (ml) (Deshawn-Landers Drain 10/19/21 Mid;Lower Abdomen) 110 170 280      Shift Total(mL/kg) 760(7.3) 170(1.6) 930(9)      NET 1907.1 -170 1737.1      Weight (kg) 103.9 103.5 103.5 103.5 103.5 103.5       PE  Pulm - CTAB  CV - RRR  Abd - soft,ND, BS present, incisions c/d/i, jevon ss    Labs  Recent Results (from the past 12 hour(s))   METABOLIC PANEL, BASIC    Collection Time: 10/21/21  2:30 AM   Result Value Ref Range    Sodium 139 136 - 145 mmol/L    Potassium 3.6 3.5 - 5.1 mmol/L    Chloride 110 (H) 97 - 108 mmol/L    CO2 26 21 - 32 mmol/L    Anion gap 3 (L) 5 - 15 mmol/L    Glucose 99 65 - 100 mg/dL    BUN 16 6 - 20 MG/DL    Creatinine 0.70 0.55 - 1.02 MG/DL    BUN/Creatinine ratio 23 (H) 12 - 20      GFR est AA >60 >60 ml/min/1.73m2    GFR est non-AA >60 >60 ml/min/1.73m2    Calcium 8.6 8.5 - 10.1 MG/DL   CBC W/O DIFF    Collection Time: 10/21/21  2:30 AM   Result Value Ref Range    WBC 10.9 3.6 - 11.0 K/uL    RBC 3.39 (L) 3.80 - 5.20 M/uL    HGB 10.3 (L) 11.5 - 16.0 g/dL    HCT 34.0 (L) 35.0 - 47.0 %    .3 (H) 80.0 - 99.0 FL    MCH 30.4 26.0 - 34.0 PG    MCHC 30.3 30.0 - 36.5 g/dL    RDW 14.0 11.5 - 14.5 %    PLATELET 483 912 - 801 K/uL    MPV 10.3 8.9 - 12.9 FL    NRBC 0.0 0  WBC    ABSOLUTE NRBC 0.00 0.00 - 0.01 K/uL   MAGNESIUM    Collection Time: 10/21/21  2:30 AM   Result Value Ref Range    Magnesium 1.9 1.6 - 2.4 mg/dL   PHOSPHORUS    Collection Time: 10/21/21  2:30 AM   Result Value Ref Range    Phosphorus 1.2 (L) 2.6 - 4.7 MG/DL         Assessment     Lázaro Duarte is a 45 y. o.yr old female s/p laparoscopic appendectomy for perforated appendicitis    Plan     Starting full liquids today  OOB more today  Decrease IVF  Awaiting more bowel function  Replace phos  WBC decreasing, sepsis resolved and improving as expected    Cleopatra Rodriguez MD

## 2021-10-21 NOTE — PROGRESS NOTES
Problem: Falls - Risk of  Goal: *Absence of Falls  Description: Document Starleen Freeze Fall Risk and appropriate interventions in the flowsheet.   Outcome: Progressing Towards Goal  Note: Fall Risk Interventions:            Medication Interventions: Bed/chair exit alarm

## 2021-10-22 LAB
ANION GAP SERPL CALC-SCNC: 8 MMOL/L (ref 5–15)
BUN SERPL-MCNC: 16 MG/DL (ref 6–20)
BUN/CREAT SERPL: 31 (ref 12–20)
CALCIUM SERPL-MCNC: 8.4 MG/DL (ref 8.5–10.1)
CHLORIDE SERPL-SCNC: 107 MMOL/L (ref 97–108)
CO2 SERPL-SCNC: 18 MMOL/L (ref 21–32)
CREAT SERPL-MCNC: 0.52 MG/DL (ref 0.55–1.02)
ERYTHROCYTE [DISTWIDTH] IN BLOOD BY AUTOMATED COUNT: 13.8 % (ref 11.5–14.5)
GLUCOSE SERPL-MCNC: 114 MG/DL (ref 65–100)
HCT VFR BLD AUTO: 40.3 % (ref 35–47)
HGB BLD-MCNC: 12.4 G/DL (ref 11.5–16)
MAGNESIUM SERPL-MCNC: 1.9 MG/DL (ref 1.6–2.4)
MCH RBC QN AUTO: 30.5 PG (ref 26–34)
MCHC RBC AUTO-ENTMCNC: 30.8 G/DL (ref 30–36.5)
MCV RBC AUTO: 99 FL (ref 80–99)
NRBC # BLD: 0 K/UL (ref 0–0.01)
NRBC BLD-RTO: 0 PER 100 WBC
PHOSPHATE SERPL-MCNC: 2.4 MG/DL (ref 2.6–4.7)
PLATELET # BLD AUTO: 228 K/UL (ref 150–400)
PMV BLD AUTO: 10 FL (ref 8.9–12.9)
POTASSIUM SERPL-SCNC: 4.1 MMOL/L (ref 3.5–5.1)
RBC # BLD AUTO: 4.07 M/UL (ref 3.8–5.2)
SODIUM SERPL-SCNC: 133 MMOL/L (ref 136–145)
WBC # BLD AUTO: 12.1 K/UL (ref 3.6–11)

## 2021-10-22 PROCEDURE — 74011250637 HC RX REV CODE- 250/637: Performed by: SURGERY

## 2021-10-22 PROCEDURE — 74011250636 HC RX REV CODE- 250/636: Performed by: SURGERY

## 2021-10-22 PROCEDURE — 77010033678 HC OXYGEN DAILY

## 2021-10-22 PROCEDURE — 74011000250 HC RX REV CODE- 250: Performed by: SURGERY

## 2021-10-22 PROCEDURE — C9113 INJ PANTOPRAZOLE SODIUM, VIA: HCPCS | Performed by: SURGERY

## 2021-10-22 PROCEDURE — 80048 BASIC METABOLIC PNL TOTAL CA: CPT

## 2021-10-22 PROCEDURE — 96376 TX/PRO/DX INJ SAME DRUG ADON: CPT

## 2021-10-22 PROCEDURE — 36415 COLL VENOUS BLD VENIPUNCTURE: CPT

## 2021-10-22 PROCEDURE — 99218 HC RM OBSERVATION: CPT

## 2021-10-22 PROCEDURE — 74011000258 HC RX REV CODE- 258: Performed by: SURGERY

## 2021-10-22 PROCEDURE — 83735 ASSAY OF MAGNESIUM: CPT

## 2021-10-22 PROCEDURE — 94760 N-INVAS EAR/PLS OXIMETRY 1: CPT

## 2021-10-22 PROCEDURE — 74011250637 HC RX REV CODE- 250/637: Performed by: NURSE PRACTITIONER

## 2021-10-22 PROCEDURE — 84100 ASSAY OF PHOSPHORUS: CPT

## 2021-10-22 PROCEDURE — 85027 COMPLETE CBC AUTOMATED: CPT

## 2021-10-22 RX ORDER — SODIUM,POTASSIUM PHOSPHATES 280-250MG
1 POWDER IN PACKET (EA) ORAL 2 TIMES DAILY
Status: DISCONTINUED | OUTPATIENT
Start: 2021-10-22 | End: 2021-10-24

## 2021-10-22 RX ORDER — ZOLMITRIPTAN 5 MG/1
5 TABLET, FILM COATED ORAL AS NEEDED
Status: DISCONTINUED | OUTPATIENT
Start: 2021-10-22 | End: 2021-10-27 | Stop reason: HOSPADM

## 2021-10-22 RX ADMIN — HYDRALAZINE HYDROCHLORIDE 20 MG: 20 INJECTION INTRAMUSCULAR; INTRAVENOUS at 13:02

## 2021-10-22 RX ADMIN — POTASSIUM & SODIUM PHOSPHATES POWDER PACK 280-160-250 MG 1 PACKET: 280-160-250 PACK at 18:14

## 2021-10-22 RX ADMIN — KETOROLAC TROMETHAMINE 15 MG: 30 INJECTION, SOLUTION INTRAMUSCULAR; INTRAVENOUS at 18:14

## 2021-10-22 RX ADMIN — HYDRALAZINE HYDROCHLORIDE 20 MG: 20 INJECTION INTRAMUSCULAR; INTRAVENOUS at 23:36

## 2021-10-22 RX ADMIN — SODIUM CHLORIDE 100 ML/HR: 9 INJECTION, SOLUTION INTRAVENOUS at 19:54

## 2021-10-22 RX ADMIN — PIPERACILLIN AND TAZOBACTAM 3.38 G: 3; .375 INJECTION, POWDER, LYOPHILIZED, FOR SOLUTION INTRAVENOUS at 10:43

## 2021-10-22 RX ADMIN — POTASSIUM & SODIUM PHOSPHATES POWDER PACK 280-160-250 MG 1 PACKET: 280-160-250 PACK at 11:14

## 2021-10-22 RX ADMIN — HYDROMORPHONE HYDROCHLORIDE 1 MG: 1 INJECTION, SOLUTION INTRAMUSCULAR; INTRAVENOUS; SUBCUTANEOUS at 21:24

## 2021-10-22 RX ADMIN — HYDROMORPHONE HYDROCHLORIDE 1 MG: 1 INJECTION, SOLUTION INTRAMUSCULAR; INTRAVENOUS; SUBCUTANEOUS at 02:46

## 2021-10-22 RX ADMIN — HYDROMORPHONE HYDROCHLORIDE 1 MG: 1 INJECTION, SOLUTION INTRAMUSCULAR; INTRAVENOUS; SUBCUTANEOUS at 10:59

## 2021-10-22 RX ADMIN — ZOLMITRIPTAN 5 MG: 5 TABLET ORAL at 17:00

## 2021-10-22 RX ADMIN — DULOXETINE HYDROCHLORIDE 60 MG: 60 CAPSULE, DELAYED RELEASE ORAL at 11:16

## 2021-10-22 RX ADMIN — PIPERACILLIN AND TAZOBACTAM 3.38 G: 3; .375 INJECTION, POWDER, LYOPHILIZED, FOR SOLUTION INTRAVENOUS at 18:14

## 2021-10-22 RX ADMIN — PIPERACILLIN AND TAZOBACTAM 3.38 G: 3; .375 INJECTION, POWDER, LYOPHILIZED, FOR SOLUTION INTRAVENOUS at 02:23

## 2021-10-22 RX ADMIN — METOPROLOL SUCCINATE 100 MG: 50 TABLET, EXTENDED RELEASE ORAL at 11:16

## 2021-10-22 RX ADMIN — KETOROLAC TROMETHAMINE 15 MG: 30 INJECTION, SOLUTION INTRAMUSCULAR; INTRAVENOUS at 23:37

## 2021-10-22 RX ADMIN — SODIUM CHLORIDE 40 MG: 9 INJECTION, SOLUTION INTRAMUSCULAR; INTRAVENOUS; SUBCUTANEOUS at 10:45

## 2021-10-22 RX ADMIN — HYDROMORPHONE HYDROCHLORIDE 1 MG: 1 INJECTION, SOLUTION INTRAMUSCULAR; INTRAVENOUS; SUBCUTANEOUS at 14:17

## 2021-10-22 RX ADMIN — KETOROLAC TROMETHAMINE 15 MG: 30 INJECTION, SOLUTION INTRAMUSCULAR; INTRAVENOUS at 13:03

## 2021-10-22 RX ADMIN — KETOROLAC TROMETHAMINE 15 MG: 30 INJECTION, SOLUTION INTRAMUSCULAR; INTRAVENOUS at 05:36

## 2021-10-22 NOTE — PROGRESS NOTES
Problem: Falls - Risk of  Goal: *Absence of Falls  Description: Document Nivia Le Fall Risk and appropriate interventions in the flowsheet.   Outcome: Progressing Towards Goal  Note: Fall Risk Interventions:            Medication Interventions: Patient to call before getting OOB

## 2021-10-22 NOTE — PROGRESS NOTES
VANI: Anticipate discharge home with  pending medical progress. Transportation in car with . RUR: Observation    Disposition: Patient underwent laparoscopic appendectomy on 10/19. Per nursing staff, patient has developed an ileus. Must have a bowel movement before she can be discharged. No CM needs at this time.     Dayan Chirinos, [address] [place] [address],[address]  [phone]

## 2021-10-22 NOTE — PROGRESS NOTES
[employer] [facility] at [facility] [place] [facility]    POD #3    Subjective     NPO with ice, no nausea now, passing some flatus now, pain controlled but still having a good bit of pain, voiding    Objective     Patient Vitals for the past 24 hrs:   Temp Pulse Resp BP SpO2   10/22/21 0821 99.1 °F (37.3 °C) 83 17 (!) 187/98 99 %   10/22/21 0600  88      10/22/21 0400  86      10/22/21 0338 98.4 °F (36.9 °C) 92 17 138/82 95 %   10/22/21 0200  93      10/21/21 2318 97.7 °F (36.5 °C) 81 17 (!) 180/111 95 %   10/21/21 2200  82      10/21/21 2014 99 °F (37.2 °C) 81 20 (!) 162/94 96 %   10/21/21 2000  89      10/21/21 1817  99      10/21/21 1524 98.9 °F (37.2 °C) 90 18 (!) 150/94 97 %   10/21/21 1419  94      10/21/21 1358  95 20 (!) 145/80 96 %   10/21/21 1215  100      10/21/21 1119 97.7 °F (36.5 °C) 98 19 (!) 147/84 95 %   10/21/21 1016  98      10/21/21 0956  98 24 (!) 154/85 92 %       Date 10/21/21 0700 - 10/22/21 0659 10/22/21 0700 - 10/23/21 0659   Shift 1897-9426 8778-3301 24 Hour Total 7587-5606 1906-0713 24 Hour Total   INTAKE   I.V.(mL/kg/hr) 3339.6(2.7)  3339.6(1.4)        Volume (0.9% sodium chloride infusion) 3039.6  3039.6        Volume (potassium phosphate 20 mmol in 0.9% sodium chloride 250 mL infusion) 0  0        Volume (piperacillin-tazobactam (ZOSYN) 3.375 g in 0.9% sodium chloride (MBP/ADV) 100 mL MBP) 300  300      Shift Total(mL/kg) 3339. 6(32.3)  3339. 6(32.5)      OUTPUT   Urine(mL/kg/hr)           Urine Occurrence(s)  1 x 1 x      Drains 440 210 650        Output (ml) (Deshawn-Landers Drain 10/19/21 Mid;Lower Abdomen) 440 210 650      Shift Total(mL/kg) 440(4.3) 210(2) 650(6.3)      NET 2899.6 -210 2689. 6      Weight (kg) 103.5 102.7 102.7 102.7 102.7 102.7       PE  Pulm - CTAB  CV - RRR  Abd - soft, distended, BS hypo, incisions c/d/i, NIMISHA serous    Labs  Recent Results (from the past 12 hour(s))   METABOLIC PANEL, BASIC    Collection Time: 10/22/21  3:10 AM   Result Value Ref Range    Sodium 133 (L) 136 - 145 mmol/L    Potassium 4.1 3.5 - 5.1 mmol/L    Chloride 107 97 - 108 mmol/L    CO2 18 (L) 21 - 32 mmol/L    Anion gap 8 5 - 15 mmol/L    Glucose 114 (H) 65 - 100 mg/dL    BUN 16 6 - 20 MG/DL    Creatinine 0.52 (L) 0.55 - 1.02 MG/DL    BUN/Creatinine ratio 31 (H) 12 - 20      GFR est AA >60 >60 ml/min/1.73m2    GFR est non-AA >60 >60 ml/min/1.73m2    Calcium 8.4 (L) 8.5 - 10.1 MG/DL   MAGNESIUM    Collection Time: 10/22/21  3:10 AM   Result Value Ref Range    Magnesium 1.9 1.6 - 2.4 mg/dL   PHOSPHORUS    Collection Time: 10/22/21  3:10 AM   Result Value Ref Range    Phosphorus 2.4 (L) 2.6 - 4.7 MG/DL   CBC W/O DIFF    Collection Time: 10/22/21  3:10 AM   Result Value Ref Range    WBC 12.1 (H) 3.6 - 11.0 K/uL    RBC 4.07 3.80 - 5.20 M/uL    HGB 12.4 11.5 - 16.0 g/dL    HCT 40.3 35.0 - 47.0 %    MCV 99.0 80.0 - 99.0 FL    MCH 30.5 26.0 - 34.0 PG    MCHC 30.8 30.0 - 36.5 g/dL    RDW 13.8 11.5 - 14.5 %    PLATELET 107 213 - 478 K/uL    MPV 10.0 8.9 - 12.9 FL    NRBC 0.0 0  WBC    ABSOLUTE NRBC 0.00 0.00 - 0.01 K/uL         Assessment     Drew Brewster is a 45 y. o.yr old female s/p laparoscopic appendectomy for perforated appendicitis    Plan     She still has some post op ileus as expected, there was a lot of small bowel secondary inflammation from the contamination from the perforation  I will give her sips of clears today  Continue IVF  Replace Na phos today  OOB more today  Keeping NIMISHA drain in place, output is clear, no signs of stump leak  She is passing some flatus which is new and good  We will slowly advance her diet as she improves and feels better    Ana Smith MD

## 2021-10-22 NOTE — PROGRESS NOTES
Bedside shift change report given to [address] (oncoming nurse) by Bentley Thurman RN (offgoing nurse). Report included the following information SBAR, Intake/Output, MAR, Med Rec Status and Cardiac Rhythm nsr.

## 2021-10-23 LAB
ANION GAP SERPL CALC-SCNC: 6 MMOL/L (ref 5–15)
BUN SERPL-MCNC: 12 MG/DL (ref 6–20)
BUN/CREAT SERPL: 24 (ref 12–20)
CALCIUM SERPL-MCNC: 8.9 MG/DL (ref 8.5–10.1)
CHLORIDE SERPL-SCNC: 107 MMOL/L (ref 97–108)
CO2 SERPL-SCNC: 24 MMOL/L (ref 21–32)
CREAT SERPL-MCNC: 0.49 MG/DL (ref 0.55–1.02)
ERYTHROCYTE [DISTWIDTH] IN BLOOD BY AUTOMATED COUNT: 14.2 % (ref 11.5–14.5)
GLUCOSE SERPL-MCNC: 99 MG/DL (ref 65–100)
HCT VFR BLD AUTO: 34.9 % (ref 35–47)
HGB BLD-MCNC: 11.5 G/DL (ref 11.5–16)
MAGNESIUM SERPL-MCNC: 2 MG/DL (ref 1.6–2.4)
MCH RBC QN AUTO: 30.3 PG (ref 26–34)
MCHC RBC AUTO-ENTMCNC: 33 G/DL (ref 30–36.5)
MCV RBC AUTO: 92.1 FL (ref 80–99)
NRBC # BLD: 0 K/UL (ref 0–0.01)
NRBC BLD-RTO: 0 PER 100 WBC
PHOSPHATE SERPL-MCNC: 3.2 MG/DL (ref 2.6–4.7)
PLATELET # BLD AUTO: 287 K/UL (ref 150–400)
PMV BLD AUTO: 9.4 FL (ref 8.9–12.9)
POTASSIUM SERPL-SCNC: 3.1 MMOL/L (ref 3.5–5.1)
RBC # BLD AUTO: 3.79 M/UL (ref 3.8–5.2)
SODIUM SERPL-SCNC: 137 MMOL/L (ref 136–145)
WBC # BLD AUTO: 9.5 K/UL (ref 3.6–11)

## 2021-10-23 PROCEDURE — 74011250637 HC RX REV CODE- 250/637: Performed by: SURGERY

## 2021-10-23 PROCEDURE — 74011000258 HC RX REV CODE- 258: Performed by: SURGERY

## 2021-10-23 PROCEDURE — 80048 BASIC METABOLIC PNL TOTAL CA: CPT

## 2021-10-23 PROCEDURE — 96376 TX/PRO/DX INJ SAME DRUG ADON: CPT

## 2021-10-23 PROCEDURE — 83735 ASSAY OF MAGNESIUM: CPT

## 2021-10-23 PROCEDURE — 85027 COMPLETE CBC AUTOMATED: CPT

## 2021-10-23 PROCEDURE — 74011250636 HC RX REV CODE- 250/636: Performed by: SURGERY

## 2021-10-23 PROCEDURE — 84100 ASSAY OF PHOSPHORUS: CPT

## 2021-10-23 PROCEDURE — 36415 COLL VENOUS BLD VENIPUNCTURE: CPT

## 2021-10-23 PROCEDURE — C9113 INJ PANTOPRAZOLE SODIUM, VIA: HCPCS | Performed by: SURGERY

## 2021-10-23 PROCEDURE — 99218 HC RM OBSERVATION: CPT

## 2021-10-23 PROCEDURE — 99024 POSTOP FOLLOW-UP VISIT: CPT | Performed by: SURGERY

## 2021-10-23 PROCEDURE — 74011000250 HC RX REV CODE- 250: Performed by: SURGERY

## 2021-10-23 RX ORDER — POTASSIUM CHLORIDE 7.45 MG/ML
10 INJECTION INTRAVENOUS
Status: COMPLETED | OUTPATIENT
Start: 2021-10-23 | End: 2021-10-23

## 2021-10-23 RX ADMIN — SODIUM CHLORIDE 40 MG: 9 INJECTION, SOLUTION INTRAMUSCULAR; INTRAVENOUS; SUBCUTANEOUS at 08:41

## 2021-10-23 RX ADMIN — POTASSIUM & SODIUM PHOSPHATES POWDER PACK 280-160-250 MG 1 PACKET: 280-160-250 PACK at 08:41

## 2021-10-23 RX ADMIN — HYDROMORPHONE HYDROCHLORIDE 1 MG: 1 INJECTION, SOLUTION INTRAMUSCULAR; INTRAVENOUS; SUBCUTANEOUS at 13:29

## 2021-10-23 RX ADMIN — POTASSIUM CHLORIDE 10 MEQ: 10 INJECTION, SOLUTION INTRAVENOUS at 11:20

## 2021-10-23 RX ADMIN — POTASSIUM CHLORIDE 10 MEQ: 10 INJECTION, SOLUTION INTRAVENOUS at 12:22

## 2021-10-23 RX ADMIN — PIPERACILLIN AND TAZOBACTAM 3.38 G: 3; .375 INJECTION, POWDER, LYOPHILIZED, FOR SOLUTION INTRAVENOUS at 11:20

## 2021-10-23 RX ADMIN — POTASSIUM CHLORIDE 10 MEQ: 10 INJECTION, SOLUTION INTRAVENOUS at 13:29

## 2021-10-23 RX ADMIN — HYDROMORPHONE HYDROCHLORIDE 1 MG: 1 INJECTION, SOLUTION INTRAMUSCULAR; INTRAVENOUS; SUBCUTANEOUS at 08:40

## 2021-10-23 RX ADMIN — HYDROMORPHONE HYDROCHLORIDE 1 MG: 1 INJECTION, SOLUTION INTRAMUSCULAR; INTRAVENOUS; SUBCUTANEOUS at 17:15

## 2021-10-23 RX ADMIN — KETOROLAC TROMETHAMINE 15 MG: 30 INJECTION, SOLUTION INTRAMUSCULAR; INTRAVENOUS at 11:20

## 2021-10-23 RX ADMIN — PIPERACILLIN AND TAZOBACTAM 3.38 G: 3; .375 INJECTION, POWDER, LYOPHILIZED, FOR SOLUTION INTRAVENOUS at 03:23

## 2021-10-23 RX ADMIN — KETOROLAC TROMETHAMINE 15 MG: 30 INJECTION, SOLUTION INTRAMUSCULAR; INTRAVENOUS at 06:32

## 2021-10-23 RX ADMIN — KETOROLAC TROMETHAMINE 15 MG: 30 INJECTION, SOLUTION INTRAMUSCULAR; INTRAVENOUS at 17:15

## 2021-10-23 RX ADMIN — HYDROMORPHONE HYDROCHLORIDE 1 MG: 1 INJECTION, SOLUTION INTRAMUSCULAR; INTRAVENOUS; SUBCUTANEOUS at 03:19

## 2021-10-23 RX ADMIN — HYDRALAZINE HYDROCHLORIDE 20 MG: 20 INJECTION INTRAMUSCULAR; INTRAVENOUS at 21:08

## 2021-10-23 RX ADMIN — SODIUM CHLORIDE 100 ML/HR: 9 INJECTION, SOLUTION INTRAVENOUS at 22:42

## 2021-10-23 RX ADMIN — METOPROLOL SUCCINATE 100 MG: 50 TABLET, EXTENDED RELEASE ORAL at 08:39

## 2021-10-23 RX ADMIN — HYDROMORPHONE HYDROCHLORIDE 1 MG: 1 INJECTION, SOLUTION INTRAMUSCULAR; INTRAVENOUS; SUBCUTANEOUS at 21:08

## 2021-10-23 RX ADMIN — POTASSIUM CHLORIDE 10 MEQ: 10 INJECTION, SOLUTION INTRAVENOUS at 13:00

## 2021-10-23 RX ADMIN — PIPERACILLIN AND TAZOBACTAM 3.38 G: 3; .375 INJECTION, POWDER, LYOPHILIZED, FOR SOLUTION INTRAVENOUS at 17:14

## 2021-10-23 RX ADMIN — POTASSIUM & SODIUM PHOSPHATES POWDER PACK 280-160-250 MG 1 PACKET: 280-160-250 PACK at 17:15

## 2021-10-23 RX ADMIN — KETOROLAC TROMETHAMINE 15 MG: 30 INJECTION, SOLUTION INTRAMUSCULAR; INTRAVENOUS at 23:35

## 2021-10-23 RX ADMIN — DULOXETINE HYDROCHLORIDE 60 MG: 60 CAPSULE, DELAYED RELEASE ORAL at 08:39

## 2021-10-23 RX ADMIN — BUTALBITAL, ACETAMINOPHEN, AND CAFFEINE 1 TABLET: 50; 325; 40 TABLET ORAL at 23:35

## 2021-10-23 NOTE — PROGRESS NOTES
Problem: Falls - Risk of  Goal: *Absence of Falls  Description: Document Raji Fernandez Fall Risk and appropriate interventions in the flowsheet.   Outcome: Progressing Towards Goal  Note: Fall Risk Interventions:            Medication Interventions: Patient to call before getting OOB         History of Falls Interventions: Door open when patient unattended

## 2021-10-23 NOTE — PROGRESS NOTES
SBAR report was given to Mason Krause from [employer]. Discussion of meds, assessment, O2 sats and chief compliants was reported.

## 2021-10-23 NOTE — PROGRESS NOTES
Progress Note    Patient: Alanna Delarosa MRN: [id number]  SSN: [id number]    YOB: 1970  Age: 45 y.o. Sex: female      Admit Date: 10/18/2021    4 Days Post-Op    Procedure:  Procedure(s):  APPENDECTOMY LAPAROSCOPIC    Subjective:     Patient denies nausea or vomiting. Some regurgitation yesterday but none today. She has been passing gas. Objective:     Visit Vitals  BP (!) 158/90   Pulse 60   Temp 98.4 °F (36.9 °C)   Resp 14   Ht 5' 6.93\" (1.7 m)   Wt 224 lb 13.9 oz (102 kg)   SpO2 94%   BMI 35.29 kg/m²       Temp (24hrs), Av.4 °F (36.9 °C), Min:97.9 °F (36.6 °C), Max:98.8 °F (37.1 °C)      Physical Exam:    General alert no acute distress   lungs clear bilaterally  Heart regular rate and rhythm  Abdomen soft incisional tenderness mild right upper quadrant tenderness NIMISHA drain serous  Some bowel sounds    Data Review: images and reports reviewed    Lab Review: All lab results for the last 24 hours reviewed.   Recent Results (from the past 24 hour(s))   METABOLIC PANEL, BASIC    Collection Time: 10/23/21  3:36 AM   Result Value Ref Range    Sodium 137 136 - 145 mmol/L    Potassium 3.1 (L) 3.5 - 5.1 mmol/L    Chloride 107 97 - 108 mmol/L    CO2 24 21 - 32 mmol/L    Anion gap 6 5 - 15 mmol/L    Glucose 99 65 - 100 mg/dL    BUN 12 6 - 20 MG/DL    Creatinine 0.49 (L) 0.55 - 1.02 MG/DL    BUN/Creatinine ratio 24 (H) 12 - 20      GFR est AA >60 >60 ml/min/1.73m2    GFR est non-AA >60 >60 ml/min/1.73m2    Calcium 8.9 8.5 - 10.1 MG/DL   MAGNESIUM    Collection Time: 10/23/21  3:36 AM   Result Value Ref Range    Magnesium 2.0 1.6 - 2.4 mg/dL   PHOSPHORUS    Collection Time: 10/23/21  3:36 AM   Result Value Ref Range    Phosphorus 3.2 2.6 - 4.7 MG/DL   CBC W/O DIFF    Collection Time: 10/23/21  3:36 AM   Result Value Ref Range    WBC 9.5 3.6 - 11.0 K/uL    RBC 3.79 (L) 3.80 - 5.20 M/uL    HGB 11.5 11.5 - 16.0 g/dL    HCT 34.9 (L) 35.0 - 47.0 %    MCV 92.1 80.0 - 99.0 FL    MCH 30.3 26.0 - 34.0 PG    MCHC 33.0 30.0 - 36.5 g/dL    RDW 14.2 11.5 - 14.5 %    PLATELET 711 903 - 934 K/uL    MPV 9.4 8.9 - 12.9 FL    NRBC 0.0 0  WBC    ABSOLUTE NRBC 0.00 0.00 - 0.01 K/uL       Assessment:     Hospital Problems  Date Reviewed: 10/4/2021        Codes Class Noted POA    Appendicitis ICD-10-CM: K37  ICD-9-CM: 580  10/19/2021 Unknown        Hypertension ICD-10-CM: I10  ICD-9-CM: 401.9  10/19/2021 Unknown        Migraines ICD-10-CM: O49.786  ICD-9-CM: 346.90  Unknown Yes        Anxiety and depression ICD-10-CM: F41.9, F32. A  ICD-9-CM: 300.00, 311  Unknown Yes        Mitral valve prolapse ICD-10-CM: I34.1  ICD-9-CM: 424.0  8/18/2020 Yes              Plan/Recommendations/Medical Decision Making:   Seems to be improving  Will advance diet to clear liquids. Needs to be out of bed and ambulating. Continue IV antibiotics.   Hypokalemia replete IV

## 2021-10-24 LAB
ANION GAP SERPL CALC-SCNC: 6 MMOL/L (ref 5–15)
BUN SERPL-MCNC: 9 MG/DL (ref 6–20)
BUN/CREAT SERPL: 19 (ref 12–20)
CALCIUM SERPL-MCNC: 8.6 MG/DL (ref 8.5–10.1)
CHLORIDE SERPL-SCNC: 106 MMOL/L (ref 97–108)
CO2 SERPL-SCNC: 24 MMOL/L (ref 21–32)
CREAT SERPL-MCNC: 0.48 MG/DL (ref 0.55–1.02)
ERYTHROCYTE [DISTWIDTH] IN BLOOD BY AUTOMATED COUNT: 14.2 % (ref 11.5–14.5)
GLUCOSE SERPL-MCNC: 128 MG/DL (ref 65–100)
HCT VFR BLD AUTO: 35.4 % (ref 35–47)
HGB BLD-MCNC: 11.4 G/DL (ref 11.5–16)
MAGNESIUM SERPL-MCNC: 1.7 MG/DL (ref 1.6–2.4)
MCH RBC QN AUTO: 30 PG (ref 26–34)
MCHC RBC AUTO-ENTMCNC: 32.2 G/DL (ref 30–36.5)
MCV RBC AUTO: 93.2 FL (ref 80–99)
NRBC # BLD: 0 K/UL (ref 0–0.01)
NRBC BLD-RTO: 0 PER 100 WBC
PHOSPHATE SERPL-MCNC: 3.6 MG/DL (ref 2.6–4.7)
PLATELET # BLD AUTO: 302 K/UL (ref 150–400)
PMV BLD AUTO: 9.6 FL (ref 8.9–12.9)
POTASSIUM SERPL-SCNC: 3.4 MMOL/L (ref 3.5–5.1)
RBC # BLD AUTO: 3.8 M/UL (ref 3.8–5.2)
SODIUM SERPL-SCNC: 136 MMOL/L (ref 136–145)
WBC # BLD AUTO: 10.5 K/UL (ref 3.6–11)

## 2021-10-24 PROCEDURE — 74011250637 HC RX REV CODE- 250/637: Performed by: SURGERY

## 2021-10-24 PROCEDURE — 84100 ASSAY OF PHOSPHORUS: CPT

## 2021-10-24 PROCEDURE — 74011000258 HC RX REV CODE- 258: Performed by: SURGERY

## 2021-10-24 PROCEDURE — 74011250637 HC RX REV CODE- 250/637: Performed by: NURSE PRACTITIONER

## 2021-10-24 PROCEDURE — 99024 POSTOP FOLLOW-UP VISIT: CPT | Performed by: SURGERY

## 2021-10-24 PROCEDURE — 36415 COLL VENOUS BLD VENIPUNCTURE: CPT

## 2021-10-24 PROCEDURE — 85027 COMPLETE CBC AUTOMATED: CPT

## 2021-10-24 PROCEDURE — 74011250636 HC RX REV CODE- 250/636: Performed by: SURGERY

## 2021-10-24 PROCEDURE — 80048 BASIC METABOLIC PNL TOTAL CA: CPT

## 2021-10-24 PROCEDURE — 74011000250 HC RX REV CODE- 250: Performed by: SURGERY

## 2021-10-24 PROCEDURE — 83735 ASSAY OF MAGNESIUM: CPT

## 2021-10-24 PROCEDURE — C9113 INJ PANTOPRAZOLE SODIUM, VIA: HCPCS | Performed by: SURGERY

## 2021-10-24 PROCEDURE — 99218 HC RM OBSERVATION: CPT

## 2021-10-24 PROCEDURE — 96376 TX/PRO/DX INJ SAME DRUG ADON: CPT

## 2021-10-24 RX ORDER — POTASSIUM CHLORIDE 750 MG/1
20 TABLET, FILM COATED, EXTENDED RELEASE ORAL 2 TIMES DAILY
Status: DISCONTINUED | OUTPATIENT
Start: 2021-10-24 | End: 2021-10-27 | Stop reason: HOSPADM

## 2021-10-24 RX ORDER — LOSARTAN POTASSIUM 50 MG/1
100 TABLET ORAL DAILY
Status: DISCONTINUED | OUTPATIENT
Start: 2021-10-25 | End: 2021-10-27 | Stop reason: HOSPADM

## 2021-10-24 RX ORDER — HYDROCHLOROTHIAZIDE 25 MG/1
25 TABLET ORAL DAILY
Status: DISCONTINUED | OUTPATIENT
Start: 2021-10-25 | End: 2021-10-27 | Stop reason: HOSPADM

## 2021-10-24 RX ADMIN — ZOLMITRIPTAN 5 MG: 5 TABLET ORAL at 00:39

## 2021-10-24 RX ADMIN — OXYCODONE HYDROCHLORIDE AND ACETAMINOPHEN 1 TABLET: 5; 325 TABLET ORAL at 15:37

## 2021-10-24 RX ADMIN — OXYCODONE HYDROCHLORIDE AND ACETAMINOPHEN 1 TABLET: 5; 325 TABLET ORAL at 09:07

## 2021-10-24 RX ADMIN — PIPERACILLIN AND TAZOBACTAM 3.38 G: 3; .375 INJECTION, POWDER, LYOPHILIZED, FOR SOLUTION INTRAVENOUS at 03:31

## 2021-10-24 RX ADMIN — DULOXETINE HYDROCHLORIDE 60 MG: 60 CAPSULE, DELAYED RELEASE ORAL at 08:49

## 2021-10-24 RX ADMIN — POTASSIUM & SODIUM PHOSPHATES POWDER PACK 280-160-250 MG 1 PACKET: 280-160-250 PACK at 08:49

## 2021-10-24 RX ADMIN — POTASSIUM CHLORIDE 20 MEQ: 750 TABLET, FILM COATED, EXTENDED RELEASE ORAL at 17:52

## 2021-10-24 RX ADMIN — SODIUM CHLORIDE 40 MG: 9 INJECTION, SOLUTION INTRAMUSCULAR; INTRAVENOUS; SUBCUTANEOUS at 08:49

## 2021-10-24 RX ADMIN — KETOROLAC TROMETHAMINE 15 MG: 30 INJECTION, SOLUTION INTRAMUSCULAR; INTRAVENOUS at 17:52

## 2021-10-24 RX ADMIN — PIPERACILLIN AND TAZOBACTAM 3.38 G: 3; .375 INJECTION, POWDER, LYOPHILIZED, FOR SOLUTION INTRAVENOUS at 09:07

## 2021-10-24 RX ADMIN — KETOROLAC TROMETHAMINE 15 MG: 30 INJECTION, SOLUTION INTRAMUSCULAR; INTRAVENOUS at 06:30

## 2021-10-24 RX ADMIN — METOPROLOL SUCCINATE 100 MG: 50 TABLET, EXTENDED RELEASE ORAL at 08:49

## 2021-10-24 RX ADMIN — HYDROMORPHONE HYDROCHLORIDE 1 MG: 1 INJECTION, SOLUTION INTRAMUSCULAR; INTRAVENOUS; SUBCUTANEOUS at 22:05

## 2021-10-24 RX ADMIN — HYDRALAZINE HYDROCHLORIDE 20 MG: 20 INJECTION INTRAMUSCULAR; INTRAVENOUS at 15:37

## 2021-10-24 RX ADMIN — OXYCODONE HYDROCHLORIDE AND ACETAMINOPHEN 1 TABLET: 5; 325 TABLET ORAL at 03:31

## 2021-10-24 RX ADMIN — PIPERACILLIN AND TAZOBACTAM 3.38 G: 3; .375 INJECTION, POWDER, LYOPHILIZED, FOR SOLUTION INTRAVENOUS at 17:57

## 2021-10-24 RX ADMIN — KETOROLAC TROMETHAMINE 15 MG: 30 INJECTION, SOLUTION INTRAMUSCULAR; INTRAVENOUS at 12:15

## 2021-10-24 NOTE — PROGRESS NOTES
Bedside shift change report given to [address] [place] [address] (oncoming nurse) by Gwendolyn Sanchez (offgoing nurse).  Report included the following information SBAR, Intake/Output, MAR, Recent Results and Cardiac Rhythm SR-ST.

## 2021-10-24 NOTE — PROGRESS NOTES
Progress Note    Patient: Drew Brewster MRN: [id number]  SSN: [id number]    YOB: 1970  Age: 45 y.o. Sex: female      Admit Date: 10/18/2021    5 Days Post-Op    Procedure:  Procedure(s):  APPENDECTOMY LAPAROSCOPIC    Subjective:     Patient patient feeling a little depressed. Had issues with migraine last night. Feeling bloated. Objective:     Visit Vitals  BP (!) 175/86 (BP 1 Location: Right upper arm, BP Patient Position: At rest)   Pulse 64   Temp 98.4 °F (36.9 °C)   Resp 18   Ht 5' 6.93\" (1.7 m)   Wt 224 lb 13.9 oz (102 kg)   SpO2 100%   BMI 35.29 kg/m²       Temp (24hrs), Av.4 °F (36.9 °C), Min:97.9 °F (36.6 °C), Max:99.1 °F (37.3 °C)      Physical Exam:    General alert in no acute distress  Lungs clear bilaterally  Heart regular rate and rhythm  Abdomen soft mildly distended incisions healing well NIMISHA to serosanguineous    Data Review: images and reports reviewed    Lab Review: All lab results for the last 24 hours reviewed. Assessment:     Hospital Problems  Date Reviewed: 10/4/2021        Codes Class Noted POA    Appendicitis ICD-10-CM: K37  ICD-9-CM: 519  10/19/2021 Unknown        Hypertension ICD-10-CM: I10  ICD-9-CM: 401.9  10/19/2021 Unknown        Migraines ICD-10-CM: N28.633  ICD-9-CM: 346.90  Unknown Yes        Anxiety and depression ICD-10-CM: F41.9, F32. A  ICD-9-CM: 300.00, 311  Unknown Yes        Mitral valve prolapse ICD-10-CM: I34.1  ICD-9-CM: 424.0  2020 Yes              Plan/Recommendations/Medical Decision Making:   Continue IV antibiotics. Out of bed and ambulate. Continue clear liquid diet.   Await better bowel function to advance diet

## 2021-10-24 NOTE — PROGRESS NOTES
Bedside shift change report given to Evan Harp RN (oncoming nurse) by RASHAAD Dahl (offgoing nurse). Report included the following information SBAR, Kardex, Intake/Output, MAR, Accordion and Cardiac Rhythm NSR-Sinus tach.

## 2021-10-25 PROBLEM — K35.32 APPENDICITIS WITH PERFORATION: Status: ACTIVE | Noted: 2021-10-25

## 2021-10-25 LAB
ANION GAP SERPL CALC-SCNC: 6 MMOL/L (ref 5–15)
BUN SERPL-MCNC: 5 MG/DL (ref 6–20)
BUN/CREAT SERPL: 11 (ref 12–20)
CALCIUM SERPL-MCNC: 8.3 MG/DL (ref 8.5–10.1)
CHLORIDE SERPL-SCNC: 107 MMOL/L (ref 97–108)
CO2 SERPL-SCNC: 24 MMOL/L (ref 21–32)
CREAT SERPL-MCNC: 0.45 MG/DL (ref 0.55–1.02)
ERYTHROCYTE [DISTWIDTH] IN BLOOD BY AUTOMATED COUNT: 14.1 % (ref 11.5–14.5)
GLUCOSE SERPL-MCNC: 101 MG/DL (ref 65–100)
HCT VFR BLD AUTO: 35.1 % (ref 35–47)
HGB BLD-MCNC: 10.8 G/DL (ref 11.5–16)
MAGNESIUM SERPL-MCNC: 1.6 MG/DL (ref 1.6–2.4)
MCH RBC QN AUTO: 29.8 PG (ref 26–34)
MCHC RBC AUTO-ENTMCNC: 30.8 G/DL (ref 30–36.5)
MCV RBC AUTO: 96.7 FL (ref 80–99)
NRBC # BLD: 0 K/UL (ref 0–0.01)
NRBC BLD-RTO: 0 PER 100 WBC
PHOSPHATE SERPL-MCNC: 2.9 MG/DL (ref 2.6–4.7)
PLATELET # BLD AUTO: 287 K/UL (ref 150–400)
PMV BLD AUTO: 9.6 FL (ref 8.9–12.9)
POTASSIUM SERPL-SCNC: 3.3 MMOL/L (ref 3.5–5.1)
RBC # BLD AUTO: 3.63 M/UL (ref 3.8–5.2)
SODIUM SERPL-SCNC: 137 MMOL/L (ref 136–145)
WBC # BLD AUTO: 9.2 K/UL (ref 3.6–11)

## 2021-10-25 PROCEDURE — 36415 COLL VENOUS BLD VENIPUNCTURE: CPT

## 2021-10-25 PROCEDURE — 99218 HC RM OBSERVATION: CPT

## 2021-10-25 PROCEDURE — 74011250637 HC RX REV CODE- 250/637: Performed by: SURGERY

## 2021-10-25 PROCEDURE — 74011250636 HC RX REV CODE- 250/636: Performed by: SURGERY

## 2021-10-25 PROCEDURE — 96376 TX/PRO/DX INJ SAME DRUG ADON: CPT

## 2021-10-25 PROCEDURE — 84100 ASSAY OF PHOSPHORUS: CPT

## 2021-10-25 PROCEDURE — 65660000001 HC RM ICU INTERMED STEPDOWN

## 2021-10-25 PROCEDURE — 80048 BASIC METABOLIC PNL TOTAL CA: CPT

## 2021-10-25 PROCEDURE — 74011000258 HC RX REV CODE- 258: Performed by: SURGERY

## 2021-10-25 PROCEDURE — C9113 INJ PANTOPRAZOLE SODIUM, VIA: HCPCS | Performed by: SURGERY

## 2021-10-25 PROCEDURE — 85027 COMPLETE CBC AUTOMATED: CPT

## 2021-10-25 PROCEDURE — 83735 ASSAY OF MAGNESIUM: CPT

## 2021-10-25 PROCEDURE — 74011000250 HC RX REV CODE- 250: Performed by: SURGERY

## 2021-10-25 RX ORDER — KETOROLAC TROMETHAMINE 30 MG/ML
15 INJECTION, SOLUTION INTRAMUSCULAR; INTRAVENOUS EVERY 6 HOURS
Status: DISCONTINUED | OUTPATIENT
Start: 2021-10-25 | End: 2021-10-27 | Stop reason: HOSPADM

## 2021-10-25 RX ORDER — POTASSIUM CHLORIDE 7.45 MG/ML
10 INJECTION INTRAVENOUS
Status: DISPENSED | OUTPATIENT
Start: 2021-10-25 | End: 2021-10-25

## 2021-10-25 RX ORDER — PANTOPRAZOLE SODIUM 40 MG/1
40 TABLET, DELAYED RELEASE ORAL
Status: DISCONTINUED | OUTPATIENT
Start: 2021-10-26 | End: 2021-10-27 | Stop reason: HOSPADM

## 2021-10-25 RX ORDER — POTASSIUM CHLORIDE 7.45 MG/ML
10 INJECTION INTRAVENOUS
Status: DISCONTINUED | OUTPATIENT
Start: 2021-10-25 | End: 2021-10-25

## 2021-10-25 RX ADMIN — HYDROCHLOROTHIAZIDE 25 MG: 25 TABLET ORAL at 08:37

## 2021-10-25 RX ADMIN — OXYCODONE HYDROCHLORIDE AND ACETAMINOPHEN 1 TABLET: 5; 325 TABLET ORAL at 23:15

## 2021-10-25 RX ADMIN — OXYCODONE HYDROCHLORIDE AND ACETAMINOPHEN 1 TABLET: 5; 325 TABLET ORAL at 15:47

## 2021-10-25 RX ADMIN — HYDROMORPHONE HYDROCHLORIDE 1 MG: 1 INJECTION, SOLUTION INTRAMUSCULAR; INTRAVENOUS; SUBCUTANEOUS at 07:28

## 2021-10-25 RX ADMIN — SODIUM CHLORIDE 75 ML/HR: 9 INJECTION, SOLUTION INTRAVENOUS at 17:09

## 2021-10-25 RX ADMIN — KETOROLAC TROMETHAMINE 15 MG: 30 INJECTION, SOLUTION INTRAMUSCULAR; INTRAVENOUS at 18:27

## 2021-10-25 RX ADMIN — SODIUM CHLORIDE 40 MG: 9 INJECTION, SOLUTION INTRAMUSCULAR; INTRAVENOUS; SUBCUTANEOUS at 08:36

## 2021-10-25 RX ADMIN — KETOROLAC TROMETHAMINE 15 MG: 30 INJECTION, SOLUTION INTRAMUSCULAR; INTRAVENOUS at 01:11

## 2021-10-25 RX ADMIN — PIPERACILLIN AND TAZOBACTAM 3.38 G: 3; .375 INJECTION, POWDER, LYOPHILIZED, FOR SOLUTION INTRAVENOUS at 11:05

## 2021-10-25 RX ADMIN — POTASSIUM CHLORIDE 20 MEQ: 750 TABLET, FILM COATED, EXTENDED RELEASE ORAL at 18:27

## 2021-10-25 RX ADMIN — KETOROLAC TROMETHAMINE 15 MG: 30 INJECTION, SOLUTION INTRAMUSCULAR; INTRAVENOUS at 11:02

## 2021-10-25 RX ADMIN — OXYCODONE HYDROCHLORIDE AND ACETAMINOPHEN 1 TABLET: 5; 325 TABLET ORAL at 11:10

## 2021-10-25 RX ADMIN — KETOROLAC TROMETHAMINE 15 MG: 30 INJECTION, SOLUTION INTRAMUSCULAR; INTRAVENOUS at 23:00

## 2021-10-25 RX ADMIN — POTASSIUM CHLORIDE 20 MEQ: 750 TABLET, FILM COATED, EXTENDED RELEASE ORAL at 08:37

## 2021-10-25 RX ADMIN — PIPERACILLIN AND TAZOBACTAM 3.38 G: 3; .375 INJECTION, POWDER, LYOPHILIZED, FOR SOLUTION INTRAVENOUS at 18:27

## 2021-10-25 RX ADMIN — HYDROMORPHONE HYDROCHLORIDE 1 MG: 1 INJECTION, SOLUTION INTRAMUSCULAR; INTRAVENOUS; SUBCUTANEOUS at 03:49

## 2021-10-25 RX ADMIN — METOPROLOL SUCCINATE 100 MG: 50 TABLET, EXTENDED RELEASE ORAL at 08:37

## 2021-10-25 RX ADMIN — POTASSIUM CHLORIDE 10 MEQ: 10 INJECTION, SOLUTION INTRAVENOUS at 11:02

## 2021-10-25 RX ADMIN — PIPERACILLIN AND TAZOBACTAM 3.38 G: 3; .375 INJECTION, POWDER, LYOPHILIZED, FOR SOLUTION INTRAVENOUS at 01:11

## 2021-10-25 RX ADMIN — DULOXETINE HYDROCHLORIDE 60 MG: 60 CAPSULE, DELAYED RELEASE ORAL at 08:37

## 2021-10-25 RX ADMIN — HYDRALAZINE HYDROCHLORIDE 20 MG: 20 INJECTION INTRAMUSCULAR; INTRAVENOUS at 23:00

## 2021-10-25 RX ADMIN — POTASSIUM CHLORIDE 10 MEQ: 10 INJECTION, SOLUTION INTRAVENOUS at 11:00

## 2021-10-25 RX ADMIN — KETOROLAC TROMETHAMINE 15 MG: 30 INJECTION, SOLUTION INTRAMUSCULAR; INTRAVENOUS at 05:06

## 2021-10-25 RX ADMIN — POTASSIUM CHLORIDE 10 MEQ: 10 INJECTION, SOLUTION INTRAVENOUS at 08:37

## 2021-10-25 RX ADMIN — LOSARTAN POTASSIUM 100 MG: 50 TABLET, FILM COATED ORAL at 08:37

## 2021-10-25 NOTE — PROGRESS NOTES
Bedside shift change report given to Juno (oncoming nurse) by Aranza Freitas (offgoing nurse). Report included the following information SBAR, Cardiac Rhythm NSR and Alarm Parameters .

## 2021-10-25 NOTE — PROGRESS NOTES
Bedside shift change report given to [address] [place] [address] (oncoming nurse) by Meg Espana (offgoing nurse).  Report included the following information SBAR, Intake/Output, MAR, Recent Results and Cardiac Rhythm SR-ST.

## 2021-10-25 NOTE — PROGRESS NOTES
09:15 Alerted by LPN student that potassium was burning hand. Assessed site and Pt and Pt asked if rate could be slowed d/t burning. Called pharmacy and they said slow rate to over 2hrs and if that did not improve burning reach out to MD for bolus dose of potassium pill (40meq/1x). Will adjust rate and continue to monitor.

## 2021-10-25 NOTE — PROGRESS NOTES
[facility] Surgical Specialists at [facility] Surgery    POD #6    Subjective     On clears, no N/V, passing more flatus, had a tiny BM, no fever    Objective     Patient Vitals for the past 24 hrs:   Temp Pulse Resp BP SpO2   10/25/21 0553  (!) 56      10/25/21 0354  65      10/25/21 0324 97.8 °F (36.6 °C) 62 15 (!) 177/82 98 %   10/25/21 0158  79      10/24/21 2159  66      10/24/21 2117     99 %   10/24/21 2110  64 17 (!) 171/91 98 %   10/24/21 1957  82      10/24/21 1800  75      10/24/21 1537  65  (!) 190/81    10/24/21 1500 97.8 °F (36.6 °C) 72 18 (!) 176/101 97 %   10/24/21 1400  74      10/24/21 1200  64      10/24/21 1113 98.4 °F (36.9 °C) 64 18 (!) 175/86 100 %   10/24/21 1000  63      10/24/21 0824 99.1 °F (37.3 °C) 60 14 (!) 186/75 98 %       Date 10/24/21 0700 - 10/25/21 0659 10/25/21 0700 - 10/26/21 0659   Shift 9494-8000 3631-3282 24 Hour Total 4206-3006 1564-2785 24 Hour Total   INTAKE   Shift Total(mL/kg)         OUTPUT   Urine(mL/kg/hr)           Urine Occurrence(s) 2 x 1 x 3 x      Drains 100  100        Output (ml) (Deshawn-Landers Drain 10/19/21 Mid;Lower Abdomen) 100  100      Shift Total(mL/kg) 100(1)  100(1)      NET -100  -100      Weight (kg) 102 102 102 102 102 102       PE  Pulm - CTAB  CV - RRR  Abd - soft, mild distention, BS present, incisions c/d/i, NIMISHA serous    Labs  Recent Results (from the past 12 hour(s))   METABOLIC PANEL, BASIC    Collection Time: 10/25/21  1:32 AM   Result Value Ref Range    Sodium 137 136 - 145 mmol/L    Potassium 3.3 (L) 3.5 - 5.1 mmol/L    Chloride 107 97 - 108 mmol/L    CO2 24 21 - 32 mmol/L    Anion gap 6 5 - 15 mmol/L    Glucose 101 (H) 65 - 100 mg/dL    BUN 5 (L) 6 - 20 MG/DL    Creatinine 0.45 (L) 0.55 - 1.02 MG/DL    BUN/Creatinine ratio 11 (L) 12 - 20      GFR est AA >60 >60 ml/min/1.73m2    GFR est non-AA >60 >60 ml/min/1.73m2    Calcium 8.3 (L) 8.5 - 10.1 MG/DL   MAGNESIUM    Collection Time: 10/25/21  1:32 AM   Result Value Ref Range    Magnesium 1.6 1.6 - 2.4 mg/dL   PHOSPHORUS    Collection Time: 10/25/21  1:32 AM   Result Value Ref Range    Phosphorus 2.9 2.6 - 4.7 MG/DL   CBC W/O DIFF    Collection Time: 10/25/21  1:32 AM   Result Value Ref Range    WBC 9.2 3.6 - 11.0 K/uL    RBC 3.63 (L) 3.80 - 5.20 M/uL    HGB 10.8 (L) 11.5 - 16.0 g/dL    HCT 35.1 35.0 - 47.0 %    MCV 96.7 80.0 - 99.0 FL    MCH 29.8 26.0 - 34.0 PG    MCHC 30.8 30.0 - 36.5 g/dL    RDW 14.1 11.5 - 14.5 %    PLATELET 425 092 - 726 K/uL    MPV 9.6 8.9 - 12.9 FL    NRBC 0.0 0  WBC    ABSOLUTE NRBC 0.00 0.00 - 0.01 K/uL         Assessment     Miguel Rachel is a 45 y. o.yr old female s/p laparoscopic appendectomy for perforated appendicitis    Plan     Post op ileus seems to be improving, bowel function seems to be slowly improving  Start full liquid diet today  OOB more today  Decrease IVF  Hypokalemia - replace KCL  Continue IV abx  WBC decreasing and normalizing    Kasey Mcnamara MD

## 2021-10-25 NOTE — PROGRESS NOTES
Clinical Pharmacy Note: IV to PO Automatic Conversion  Please note: Nitesh Pruitt medication(s) (pantoprazole) has/have been changed from IV to PO based on the following critiera:    Patient is tolerating oral medications  Patient is tolerating a diet more advanced than clear liquids  Patient is not requiring vasopressors    This IV to PO conversion is based on the [employer] approved automatic conversion policy for eligible patients. Please call with questions.

## 2021-10-26 LAB
ANION GAP SERPL CALC-SCNC: 10 MMOL/L (ref 5–15)
BUN SERPL-MCNC: 6 MG/DL (ref 6–20)
BUN/CREAT SERPL: 10 (ref 12–20)
CALCIUM SERPL-MCNC: 9.3 MG/DL (ref 8.5–10.1)
CHLORIDE SERPL-SCNC: 103 MMOL/L (ref 97–108)
CO2 SERPL-SCNC: 25 MMOL/L (ref 21–32)
CREAT SERPL-MCNC: 0.58 MG/DL (ref 0.55–1.02)
GLUCOSE SERPL-MCNC: 114 MG/DL (ref 65–100)
MAGNESIUM SERPL-MCNC: 2 MG/DL (ref 1.6–2.4)
PHOSPHATE SERPL-MCNC: 3.2 MG/DL (ref 2.6–4.7)
POTASSIUM SERPL-SCNC: 3.4 MMOL/L (ref 3.5–5.1)
SODIUM SERPL-SCNC: 138 MMOL/L (ref 136–145)

## 2021-10-26 PROCEDURE — 65660000001 HC RM ICU INTERMED STEPDOWN

## 2021-10-26 PROCEDURE — 74011250637 HC RX REV CODE- 250/637: Performed by: SURGERY

## 2021-10-26 PROCEDURE — 74011250636 HC RX REV CODE- 250/636: Performed by: SURGERY

## 2021-10-26 PROCEDURE — 83735 ASSAY OF MAGNESIUM: CPT

## 2021-10-26 PROCEDURE — 74011250637 HC RX REV CODE- 250/637: Performed by: NURSE PRACTITIONER

## 2021-10-26 PROCEDURE — 74011000258 HC RX REV CODE- 258: Performed by: SURGERY

## 2021-10-26 PROCEDURE — 36415 COLL VENOUS BLD VENIPUNCTURE: CPT

## 2021-10-26 PROCEDURE — 84100 ASSAY OF PHOSPHORUS: CPT

## 2021-10-26 PROCEDURE — 80048 BASIC METABOLIC PNL TOTAL CA: CPT

## 2021-10-26 RX ORDER — POTASSIUM CHLORIDE 7.45 MG/ML
10 INJECTION INTRAVENOUS
Status: COMPLETED | OUTPATIENT
Start: 2021-10-26 | End: 2021-10-26

## 2021-10-26 RX ADMIN — KETOROLAC TROMETHAMINE 15 MG: 30 INJECTION, SOLUTION INTRAMUSCULAR; INTRAVENOUS at 10:42

## 2021-10-26 RX ADMIN — LOSARTAN POTASSIUM 100 MG: 50 TABLET, FILM COATED ORAL at 08:48

## 2021-10-26 RX ADMIN — KETOROLAC TROMETHAMINE 15 MG: 30 INJECTION, SOLUTION INTRAMUSCULAR; INTRAVENOUS at 16:13

## 2021-10-26 RX ADMIN — DULOXETINE HYDROCHLORIDE 60 MG: 60 CAPSULE, DELAYED RELEASE ORAL at 08:49

## 2021-10-26 RX ADMIN — KETOROLAC TROMETHAMINE 15 MG: 30 INJECTION, SOLUTION INTRAMUSCULAR; INTRAVENOUS at 05:35

## 2021-10-26 RX ADMIN — KETOROLAC TROMETHAMINE 15 MG: 30 INJECTION, SOLUTION INTRAMUSCULAR; INTRAVENOUS at 23:43

## 2021-10-26 RX ADMIN — POTASSIUM CHLORIDE 10 MEQ: 10 INJECTION, SOLUTION INTRAVENOUS at 11:26

## 2021-10-26 RX ADMIN — POTASSIUM CHLORIDE 10 MEQ: 10 INJECTION, SOLUTION INTRAVENOUS at 08:45

## 2021-10-26 RX ADMIN — PIPERACILLIN AND TAZOBACTAM 3.38 G: 3; .375 INJECTION, POWDER, LYOPHILIZED, FOR SOLUTION INTRAVENOUS at 03:12

## 2021-10-26 RX ADMIN — POTASSIUM CHLORIDE 20 MEQ: 750 TABLET, FILM COATED, EXTENDED RELEASE ORAL at 08:49

## 2021-10-26 RX ADMIN — PIPERACILLIN AND TAZOBACTAM 3.38 G: 3; .375 INJECTION, POWDER, LYOPHILIZED, FOR SOLUTION INTRAVENOUS at 18:28

## 2021-10-26 RX ADMIN — PIPERACILLIN AND TAZOBACTAM 3.38 G: 3; .375 INJECTION, POWDER, LYOPHILIZED, FOR SOLUTION INTRAVENOUS at 10:42

## 2021-10-26 RX ADMIN — HYDROCHLOROTHIAZIDE 25 MG: 25 TABLET ORAL at 08:49

## 2021-10-26 RX ADMIN — OXYCODONE HYDROCHLORIDE AND ACETAMINOPHEN 1 TABLET: 5; 325 TABLET ORAL at 10:42

## 2021-10-26 RX ADMIN — BUTALBITAL, ACETAMINOPHEN, AND CAFFEINE 1 TABLET: 50; 325; 40 TABLET ORAL at 04:07

## 2021-10-26 RX ADMIN — OXYCODONE HYDROCHLORIDE AND ACETAMINOPHEN 1 TABLET: 5; 325 TABLET ORAL at 18:35

## 2021-10-26 RX ADMIN — ONDANSETRON 4 MG: 2 INJECTION INTRAMUSCULAR; INTRAVENOUS at 04:07

## 2021-10-26 RX ADMIN — ZOLMITRIPTAN 5 MG: 5 TABLET ORAL at 07:03

## 2021-10-26 RX ADMIN — POTASSIUM CHLORIDE 20 MEQ: 750 TABLET, FILM COATED, EXTENDED RELEASE ORAL at 18:28

## 2021-10-26 RX ADMIN — PANTOPRAZOLE SODIUM 40 MG: 40 TABLET, DELAYED RELEASE ORAL at 07:03

## 2021-10-26 RX ADMIN — OXYCODONE HYDROCHLORIDE AND ACETAMINOPHEN 1 TABLET: 5; 325 TABLET ORAL at 23:43

## 2021-10-26 RX ADMIN — METOPROLOL SUCCINATE 100 MG: 50 TABLET, EXTENDED RELEASE ORAL at 08:49

## 2021-10-26 NOTE — PROGRESS NOTES
Bedside and Verbal shift change report given to [address] [place] [address] (oncoming nurse) by Gabby Pool (offgoing nurse). Report included the following information SBAR, Kardex, ED Summary, Intake/Output, MAR, Recent Results, Med Rec Status and Cardiac Rhythm Sinus Rhythm.

## 2021-10-26 NOTE — PROGRESS NOTES
Problem: Falls - Risk of  Goal: *Absence of Falls  Description: Document Darron Luna Fall Risk and appropriate interventions in the flowsheet.   Outcome: Progressing Towards Goal  Note: Fall Risk Interventions:            Medication Interventions: Assess postural VS orthostatic hypotension, Patient to call before getting OOB         History of Falls Interventions: Door open when patient unattended

## 2021-10-26 NOTE — PROGRESS NOTES
[facility] Surgical Specialists at [facility] Surgery    POD #7    Subjective     Having a migraine now, just got Fioricet, tolerating fulls, had 2BM, no N/V, passing flatus, feeling much better     Objective     Patient Vitals for the past 24 hrs:   Temp Pulse Resp BP SpO2   10/26/21 0354  68      10/26/21 0313 99 °F (37.2 °C) 61 12 (!) 195/106 96 %   10/26/21 0158  (!) 58      10/26/21 0000  70      10/25/21 2316    136/68    10/25/21 2300 99.2 °F (37.3 °C) 71 18 (!) 179/95 95 %   10/25/21 2202  69      10/25/21 2039 98.5 °F (36.9 °C) 77 13 (!) 189/106 97 %   10/25/21 2001  71      10/25/21 1843  74      10/25/21 1549 98.5 °F (36.9 °C) 79 17 (!) 158/82 96 %   10/25/21 1420  80      10/25/21 1223  75      10/25/21 1113 98.6 °F (37 °C) 82 15 (!) 152/72 98 %   10/25/21 1102  77      10/25/21 0826 97.8 °F (36.6 °C) 63 13 (!) 168/78 97 %       Date 10/25/21 0700 - 10/26/21 0659 10/26/21 0700 - 10/27/21 0659   Shift 9720-3949 7893-7914 24 Hour Total 7931-7911 9903-8002 24 Hour Total   INTAKE   I.V.(mL/kg/hr)  100(0.1) 100(0)        Volume (piperacillin-tazobactam (ZOSYN) 3.375 g in 0.9% sodium chloride (MBP/ADV) 100 mL MBP)  100 100      Shift Total(mL/kg)  100(1) 100(1)      OUTPUT   Urine(mL/kg/hr)           Urine Occurrence(s) 1 x  1 x      Drains 85  85        Output (ml) (Deshawn-Landers Drain 10/19/21 Mid;Lower Abdomen) 85  85      Shift Total(mL/kg) 85(0.8)  85(0.9)      NET -85 100 15      Weight (kg) 102 99 99 99 99 99       PE  Pulm - CTAB  CV - RRR  Abd - soft, ND, BS present, incisions c/d/i, NIMISHA ss    Labs  Recent Results (from the past 12 hour(s))   METABOLIC PANEL, BASIC    Collection Time: 10/26/21  4:18 AM   Result Value Ref Range    Sodium 138 136 - 145 mmol/L    Potassium 3.4 (L) 3.5 - 5.1 mmol/L    Chloride 103 97 - 108 mmol/L    CO2 25 21 - 32 mmol/L    Anion gap 10 5 - 15 mmol/L    Glucose 114 (H) 65 - 100 mg/dL    BUN 6 6 - 20 MG/DL    Creatinine 0.58 0.55 - 1.02 MG/DL    BUN/Creatinine ratio 10 (L) 12 - 20      GFR est AA >60 >60 ml/min/1.73m2    GFR est non-AA >60 >60 ml/min/1.73m2    Calcium 9.3 8.5 - 10.1 MG/DL   MAGNESIUM    Collection Time: 10/26/21  4:18 AM   Result Value Ref Range    Magnesium 2.0 1.6 - 2.4 mg/dL   PHOSPHORUS    Collection Time: 10/26/21  4:18 AM   Result Value Ref Range    Phosphorus 3.2 2.6 - 4.7 MG/DL         Assessment     Yanely Ceja is a 45 y. o.yr old female s/p laparoscopic appendectomy for perforated appendicitis    Plan     Doing well, tolerating fulls and having BM now, bowel function returned  Increase to regular diet today  DC IVF  Hypokalemia - replace KCl  OOB more today  PRN meds for migraine  Prn hydralazine for HTN  Keeping drain today  Possible DC home tomorrow assuming she does well with the diet and then will remove the drain tomorrow    Vinh Almonte MD

## 2021-10-27 VITALS
TEMPERATURE: 99 F | WEIGHT: 218.26 LBS | HEART RATE: 87 BPM | BODY MASS INDEX: 34.26 KG/M2 | OXYGEN SATURATION: 98 % | DIASTOLIC BLOOD PRESSURE: 97 MMHG | SYSTOLIC BLOOD PRESSURE: 165 MMHG | HEIGHT: 67 IN | RESPIRATION RATE: 20 BRPM

## 2021-10-27 LAB
ANION GAP SERPL CALC-SCNC: 5 MMOL/L (ref 5–15)
BUN SERPL-MCNC: 8 MG/DL (ref 6–20)
BUN/CREAT SERPL: 12 (ref 12–20)
CALCIUM SERPL-MCNC: 8.9 MG/DL (ref 8.5–10.1)
CHLORIDE SERPL-SCNC: 105 MMOL/L (ref 97–108)
CO2 SERPL-SCNC: 27 MMOL/L (ref 21–32)
CREAT SERPL-MCNC: 0.68 MG/DL (ref 0.55–1.02)
GLUCOSE SERPL-MCNC: 114 MG/DL (ref 65–100)
POTASSIUM SERPL-SCNC: 4.2 MMOL/L (ref 3.5–5.1)
SODIUM SERPL-SCNC: 137 MMOL/L (ref 136–145)

## 2021-10-27 PROCEDURE — 36415 COLL VENOUS BLD VENIPUNCTURE: CPT

## 2021-10-27 PROCEDURE — 74011250636 HC RX REV CODE- 250/636: Performed by: SURGERY

## 2021-10-27 PROCEDURE — 74011000258 HC RX REV CODE- 258: Performed by: SURGERY

## 2021-10-27 PROCEDURE — 74011250637 HC RX REV CODE- 250/637: Performed by: SURGERY

## 2021-10-27 PROCEDURE — 80048 BASIC METABOLIC PNL TOTAL CA: CPT

## 2021-10-27 RX ORDER — AMOXICILLIN AND CLAVULANATE POTASSIUM 875; 125 MG/1; MG/1
1 TABLET, FILM COATED ORAL EVERY 12 HOURS
Qty: 10 TABLET | Refills: 0 | Status: SHIPPED | OUTPATIENT
Start: 2021-10-27 | End: 2021-11-01

## 2021-10-27 RX ORDER — OXYCODONE AND ACETAMINOPHEN 5; 325 MG/1; MG/1
1 TABLET ORAL
Qty: 20 TABLET | Refills: 0 | Status: SHIPPED | OUTPATIENT
Start: 2021-10-27 | End: 2021-11-03 | Stop reason: SDUPTHER

## 2021-10-27 RX ORDER — ONDANSETRON 4 MG/1
4 TABLET, ORALLY DISINTEGRATING ORAL
Qty: 20 TABLET | Refills: 0 | Status: SHIPPED | OUTPATIENT
Start: 2021-10-27

## 2021-10-27 RX ADMIN — METOPROLOL SUCCINATE 100 MG: 50 TABLET, EXTENDED RELEASE ORAL at 10:00

## 2021-10-27 RX ADMIN — DULOXETINE HYDROCHLORIDE 60 MG: 60 CAPSULE, DELAYED RELEASE ORAL at 09:59

## 2021-10-27 RX ADMIN — PANTOPRAZOLE SODIUM 40 MG: 40 TABLET, DELAYED RELEASE ORAL at 06:48

## 2021-10-27 RX ADMIN — KETOROLAC TROMETHAMINE 15 MG: 30 INJECTION, SOLUTION INTRAMUSCULAR; INTRAVENOUS at 06:48

## 2021-10-27 RX ADMIN — PIPERACILLIN AND TAZOBACTAM 3.38 G: 3; .375 INJECTION, POWDER, LYOPHILIZED, FOR SOLUTION INTRAVENOUS at 06:48

## 2021-10-27 RX ADMIN — OXYCODONE HYDROCHLORIDE AND ACETAMINOPHEN 1 TABLET: 5; 325 TABLET ORAL at 12:34

## 2021-10-27 RX ADMIN — BUTALBITAL, ACETAMINOPHEN, AND CAFFEINE 1 TABLET: 50; 325; 40 TABLET ORAL at 04:05

## 2021-10-27 RX ADMIN — HYDROCHLOROTHIAZIDE 25 MG: 25 TABLET ORAL at 09:59

## 2021-10-27 RX ADMIN — LOSARTAN POTASSIUM 100 MG: 50 TABLET, FILM COATED ORAL at 09:59

## 2021-10-27 RX ADMIN — POTASSIUM CHLORIDE 20 MEQ: 750 TABLET, FILM COATED, EXTENDED RELEASE ORAL at 09:59

## 2021-10-27 NOTE — PROGRESS NOTES
Bedside and Verbal shift change report given to RASHAAD Shea (oncoming nurse) by Nataliia Collins (offgoing nurse).  Report included the following information SBAR, Kardex, OR Summary, Intake/Output, MAR, Recent Results and Cardiac Rhythm NSR.

## 2021-10-27 NOTE — PROGRESS NOTES
Hospital follow-up PCP transitional care appointment has been scheduled with Dr. Aparna Price for Wednesday, 11/3/21 at 10:30 a.m. Pending patient discharge.   Andres Marquez, Care Management Specialist.

## 2021-10-27 NOTE — PROGRESS NOTES
3 [facility] at [facility] Surgery    POD #8    Subjective     Doing well, tolerating regular diet, no N/V, pain minimal    Objective     Patient Vitals for the past 24 hrs:   Temp Pulse Resp BP SpO2   10/27/21 0608  70      10/27/21 0414  (!) 57      10/27/21 0403  (!) 56 20 (!) 187/75    10/27/21 0226  65      10/26/21 2347 98.3 °F (36.8 °C) 62 17 (!) 143/70 99 %   10/26/21 2204  70      10/26/21 2054 98.5 °F (36.9 °C) 67 14 (!) 167/81 98 %   10/26/21 2023  67      10/26/21 1825  66      10/26/21 1812 99.3 °F (37.4 °C) 74 16 (!) 159/87 97 %   10/26/21 1404  69      10/26/21 1212  71      10/26/21 1127 98.5 °F (36.9 °C) 71 14 (!) 154/69 96 %   10/26/21 1015  61      10/26/21 0930 98.2 °F (36.8 °C) 66 16 (!) 171/85 97 %       Date 10/26/21 0700 - 10/27/21 0659 10/27/21 0700 - 10/28/21 0659   Shift 0793-4343 4419-6411 24 Hour Total 6833-6623 3895-5330 24 Hour Total   INTAKE   I.V.(mL/kg/hr) 1400(1.2)  1400(0.6)        Volume (0.9% sodium chloride infusion) 1000  1000        Volume (piperacillin-tazobactam (ZOSYN) 3.375 g in 0.9% sodium chloride (MBP/ADV) 100 mL MBP) 200  200        Volume (potassium chloride 10 mEq in 100 ml IVPB) 200  200      Shift Total(mL/kg) 1400(14.1)  1400(14.1)      OUTPUT   Urine(mL/kg/hr)  300(0.3) 300(0.1)        Urine Voided  300 300        Urine Occurrence(s)  1 x 1 x      Drains 50 20 70        Output (ml) (Deshawn-Landers Drain 10/19/21 Mid;Lower Abdomen) 50 20 70      Shift Total(mL/kg) 50(0.5) 320(3.2) 370(3.7)      NET 1350 -320 1030      Weight (kg) 99 99 99 99 99 99       PE  Pulm - CTAB  CV - RRR  Abd - soft, ND, BS present, incisions c/d/i, jevon ss    Labs  Recent Results (from the past 12 hour(s))   METABOLIC PANEL, BASIC    Collection Time: 10/27/21  4:12 AM   Result Value Ref Range    Sodium 137 136 - 145 mmol/L    Potassium 4.2 3.5 - 5.1 mmol/L    Chloride 105 97 - 108 mmol/L    CO2 27 21 - 32 mmol/L    Anion gap 5 5 - 15 mmol/L    Glucose 114 (H) 65 - 100 mg/dL    BUN 8 6 - 20 MG/DL    Creatinine 0.68 0.55 - 1.02 MG/DL    BUN/Creatinine ratio 12 12 - 20      GFR est AA >60 >60 ml/min/1.73m2    GFR est non-AA >60 >60 ml/min/1.73m2    Calcium 8.9 8.5 - 10.1 MG/DL         Assessment     Gali Mcclure is a 45 y. o.yr old female s/p laparoscopic appendectomy with perforation    Plan     DC home today  DC NIMISHA prior to Kelle Adams MD

## 2021-10-27 NOTE — PROGRESS NOTES
Bedside and Verbal shift change report given to Cyndi Harris (oncoming nurse) by Rivera Johnson (offgoing nurse). Report included the following information SBAR, Kardex, OR Summary, Intake/Output, MAR, Recent Results and Cardiac Rhythm NSR.

## 2021-10-27 NOTE — DISCHARGE SUMMARY
Physician Discharge Summary     Patient ID:  Patrick Tejeda  [id number]  18 y.o.  1970    Admit Date: 10/18/2021    Discharge Date:     Admission Diagnoses: Appendicitis [place]; Hypertension [I10]; Appendicitis with perforation [K35.32]    Discharge Diagnoses: Active Problems:    Migraines ()      Anxiety and depression ()      Mitral valve prolapse (8/18/2020)      Appendicitis (10/19/2021)      Hypertension (10/19/2021)      Appendicitis with perforation (10/25/2021)         Admission Condition: Fair    Discharge Condition: Good    Procedure(s):    10/19/2021 - Procedure(s):  [address] Course:   She was admitted post op and had a post op ileus. She was NPO for a few days post op and then moved to clears. Once she was having some flatus then moved to full liquids and then tolerated a regular diet for 24hours prior to DC home. She had a Ham drain post op which was removed prior to DC home that was serous in output. Consults: None    Significant Diagnostic Studies: none    Disposition: home    Patient Instructions:   Current Discharge Medication List      START taking these medications    Details   oxyCODONE-acetaminophen (PERCOCET) 5-325 mg per tablet Take 1 Tablet by mouth every four (4) hours as needed for Pain for up to 7 days. Max Daily Amount: 6 Tablets. Qty: 20 Tablet, Refills: 0    Associated Diagnoses: Appendicitis with perforation      amoxicillin-clavulanate (AUGMENTIN) 875-125 mg per tablet Take 1 Tablet by mouth every twelve (12) hours for 5 days. Qty: 10 Tablet, Refills: 0      ondansetron (ZOFRAN ODT) 4 mg disintegrating tablet Take 1 Tablet by mouth every eight (8) hours as needed for Nausea or Vomiting. Qty: 20 Tablet, Refills: 0         CONTINUE these medications which have NOT CHANGED    Details   predniSONE (DELTASONE) 10 mg tablet Take 10 mg by mouth daily (with breakfast).  Take 1 tab p.o. tidx3 days, 1 tab p.o. bid x4 days, 1 tab p.o. every day x3 days  Qty: 20 Tablet, Refills: 0      Nurtec ODT 75 mg disintegrating tablet 1 tab p.o.qod, then 1 p.o. prn for severe headache  Qty: 16 Tablet, Refills: 3      olmesartan-hydroCHLOROthiazide (BENICAR HCT) 40-25 mg per tablet TAKE 1 TABLET BY MOUTH EVERY DAY  Qty: 90 Tablet, Refills: 1      rosuvastatin (CRESTOR) 5 mg tablet Take 1 Tablet by mouth nightly. Qty: 90 Tablet, Refills: 2      butalbital-acetaminophen-caffeine (FIORICET, ESGIC) -40 mg per tablet Take 1 Tab by mouth as needed for Migraine. metoprolol succinate (TOPROL-XL) 100 mg tablet Take 1 Tab by mouth daily. Qty: 90 Tab, Refills: 1    Comments: Dose increased to 100mg daily 4/27/21      DULoxetine (CYMBALTA) 60 mg capsule TAKE 1 CAPSULE BY MOUTH EVERY DAY (60mg)  Qty: 90 Cap, Refills: 2    Associated Diagnoses: Anxiety and depression      magnesium 250 mg tab Take 500 mg by mouth.       Emgality Pen 120 mg/mL injection INJECT 1ML SUBCUTANEOUSLY EVERY MONTH      tiZANidine (ZANAFLEX) 4 mg tablet TAKE 1 2 TABLETS BY MOUTH AT BEDTIME             Activity: No heavy lifting for 2 weeks  Diet: Regular Diet  Wound Care: Keep wound clean and dry    Follow-up with Nicolas Bang MD in 2 week(s)  Follow-up tests/labs none    Signed:  Nicolas Bang MD  10/27/2021  9:00 AM   .

## 2021-10-27 NOTE — DISCHARGE INSTRUCTIONS
Patient Discharge Instructions    Nancy Morley / [id number] : 1970    Admitted 10/18/2021 Discharged: 10/27/2021       LAPAROSCOPIC APPENDECTOMY      FOLLOW-UP:  Please make an appointment with your physician in 10 - 14 day(s). Call your physician immediately if you have any fevers greater than 101.5, drainage from your wound that is not clear or looks infected, persistent bleeding, increasing abdominal pain, problems urinating, or persistent nausea/vomiting. You should be aware that you may have shoulder pain after surgery and that this will progressively go away. This is called 'referred pain' and is from the area of the diaphragm caused by gas that may be trapped under the diaphragm from laparoscopic surgery. This gas will progressively get reabsorbed by your body. WOUND CARE INSTRUCTIONS:   You may shower at home. If clothing rubs against the wound or causes irritation and the wound is not draining you may cover it with a dry dressing during the daytime. Try to keep the wound dry and avoid ointments on the wound unless directed to do so. If the wound becomes bright red and painful or starts to drain infected material that is not clear, please contact your physician immediately. You should also call if you begin to drain fluid that is thin and greenish-brown from the wound and appears to look like bile. If the wound though is mildly pink and has a thick firm ridge underneath it, this is normal, and is referred to as a healing ridge. This will resolve over the next 4-6 weeks. Place an ice pack on the incisions for the next 48 hours. After that, you may use a heating pad if you feel muscle tightening or pulling. DIET:  You may eat any foods that you can tolerate. It is a good idea to eat a high fiber diet and take in plenty of fluids to prevent constipation.   If you do become constipated you may want to take a mild laxative or take ducolax tablets on a daily basis until your bowel habits are regular. Constipation can be very uncomfortable, along with straining, after recent abdominal surgery. ACTIVITY:  You are encouraged to cough and deep breath or use your incentive spirometer if you were given one, every 15-30 minutes when awake. This will help prevent respiratory complications and low grade fevers post-operatively. You may want to hug a pillow when coughing and sneezing to add additional support to the surgical area(s) which will decrease pain during these times. You are encouraged to walk and engage in light activity for the next two weeks. You should not lift more than 20 pounds during this time frame as it could put you at increased risk for a post-operative hernia. Twenty pounds is roughly equivalent to a plastic bag of groceries. · Most people are able to return to work within 1 to 2 weeks after surgery. · You may shower 24 hours after surgery. Pat the cut (incision) dry. Do not take a bath for the first week. · Your doctor will tell you when you can have sex again. MEDICATIONS:  Try to take narcotic medications and anti-inflammatory medications, such as tylenol, ibuprofen, naprosyn, etc., with food. This will minimize stomach upset from the medication. Should you develop nausea and vomiting from the pain medication, or develop a rash, please discontinue the medication and contact your physician. You should not drive, make important decisions, or operate machinery when taking narcotic pain medication. · It is important that you take the medication exactly as they are prescribed. · Keep your medication in the bottles provided by the pharmacist and keep a list of the medication names, dosages, and times to be taken in your wallet. · Do not take other medications without consulting your doctor. · Take ibuprofen (Motrin) as scheduled then combine with oxycodone/acetaminophen (Percocet, Roxicet, Tylox) as needed for severe pain.       QUESTIONS:  Please feel free to call Dr. Mary Kay Valles office ([phone]) if you have any questions, and they will be glad to assist you. Follow-up with Dr. Irlanda White in 2 week(s). Call the office to schedule your appointment. Information obtained by :    I understand that if any problems occur once I am at home I am to contact my physician. I understand and acknowledge receipt of the instructions indicated above.                                                                                                                                            Physician's or R.N.'s Signature                                                                  Date/Time                                                                                                                                              Patient or Representative Signature                                                          Date/Time

## 2021-11-03 ENCOUNTER — VIRTUAL VISIT (OUTPATIENT)
Dept: INTERNAL MEDICINE CLINIC | Age: 51
End: 2021-11-03
Payer: COMMERCIAL

## 2021-11-03 DIAGNOSIS — K35.32 APPENDICITIS WITH PERFORATION: ICD-10-CM

## 2021-11-03 DIAGNOSIS — Z09 HOSPITAL DISCHARGE FOLLOW-UP: Primary | ICD-10-CM

## 2021-11-03 DIAGNOSIS — Z90.49 STATUS POST APPENDECTOMY: ICD-10-CM

## 2021-11-03 PROCEDURE — 1111F DSCHRG MED/CURRENT MED MERGE: CPT | Performed by: INTERNAL MEDICINE

## 2021-11-03 PROCEDURE — 99214 OFFICE O/P EST MOD 30 MIN: CPT | Performed by: INTERNAL MEDICINE

## 2021-11-03 RX ORDER — OXYCODONE AND ACETAMINOPHEN 5; 325 MG/1; MG/1
1 TABLET ORAL
Qty: 10 TABLET | Refills: 0 | Status: SHIPPED | OUTPATIENT
Start: 2021-11-03 | End: 2021-11-06

## 2021-11-03 NOTE — PROGRESS NOTES
Xiomara Moore is a 45 y.o. female who was seen by synchronous (real-time) audio-video technology on 11/3/2021 for Hospital Follow Up        [address] [place] [address]:     Diagnoses and all orders for this visit:    1. Hospital discharge follow-up  -     ME DISCHARGE MEDS RECONCILED W/ CURRENT OUTPATIENT MED LIST    2. Appendicitis with perforation  Making good recovery but still has some pain. -      Refill oxyCODONE-acetaminophen (PERCOCET) 5-325 mg per tablet; Take 1 Tablet by mouth every eight (8) hours as needed for Pain for up to 3 days. Max Daily Amount: 3 Tablets. (#10, NR)    3. Status post appendectomy  Instructed her to schedule a follow up appointment with Dr. Disha Gaming in 2 weeks. She has his number and agrees to do so. Follow-up and Dispositions    · Return in about 3 months (around 2/3/2022), or if symptoms worsen or fail to improve, for HTN, chol, health maintenance with Sushila Dominguez NP (PCP). Routing History           712  Subjective:     Presents for hospital discharge follow-up. Hospitalized at [facility] from 10/18-10/27/2021 for appendicitis with perforation. She underwent laparoscopic appendectomy on 09/97/8357 that was complicated by post-op ileus. She was kept n.p.o. with a NIMISHA drain in place. Also managed with IV antibiotics. Discharged on Percocet (#20 tabs, NR), Augmentin for 5 days, and Zofran. Instructed to do no heavy lifting for 2 weeks and to follow-up with Dr. Disha Gaming in 2 weeks. Still with some abdominal pain at RLQ incision site since being home. Took 1 Percocet tablet last night, takes only when pain is severe; has 3 tablets left. Tolerating regular foods with no nausea or vomiting. No longer taking Zofran; took for about first 2 days home. Mild loose stools over past 2 days; thinks it is from antibiotic. Finished Augmentin yesterday morning. Has not been doing any heavy lifting. Friends have been helping care for her chickens and geese.  Does not have a scheduled follow up appointment with Dr. Jeannie Hale. Prior to Admission medications    Medication Sig Start Date End Date Taking? Authorizing Provider   oxyCODONE-acetaminophen (PERCOCET) 5-325 mg per tablet Take 1 Tablet by mouth every four (4) hours as needed for Pain for up to 7 days. Max Daily Amount: 6 Tablets. 10/27/21 11/3/21 Yes Lyla Nolasco MD   ondansetron (ZOFRAN ODT) 4 mg disintegrating tablet Take 1 Tablet by mouth every eight (8) hours as needed for Nausea or Vomiting. 10/27/21  Yes Lyla Nolasco MD   Nurtec ODT 75 mg disintegrating tablet 1 tab p.o.qod, then 1 p.o. prn for severe headache 10/1/21  Yes Louie Schmitt MD   olmesartan-hydroCHLOROthiazide (BENICAR HCT) 40-25 mg per tablet TAKE 1 TABLET BY MOUTH EVERY DAY 8/16/21  Yes Janessa Doshi MD   rosuvastatin (CRESTOR) 5 mg tablet Take 1 Tablet by mouth nightly. 6/1/21  Yes Janessa Doshi MD   butalbital-acetaminophen-caffeine (FIORICET, ESGIC) -40 mg per tablet Take 1 Tab by mouth as needed for Migraine. 4/4/21  Yes Provider, Historical   metoprolol succinate (TOPROL-XL) 100 mg tablet Take 1 Tab by mouth daily. 4/27/21  Yes Janessa Doshi MD   DULoxetine (CYMBALTA) 60 mg capsule TAKE 1 CAPSULE BY MOUTH EVERY DAY (60mg) 11/5/20  Yes Liza Pinto NP   magnesium 250 mg tab Take 500 mg by mouth. Yes Provider, Historical   Emgality Pen 120 mg/mL injection INJECT 1ML SUBCUTANEOUSLY EVERY MONTH 7/17/20  Yes Provider, Historical   tiZANidine (ZANAFLEX) 4 mg tablet TAKE 1 2 TABLETS BY MOUTH AT BEDTIME 5/24/20  Yes Provider, Historical     Patient Active Problem List   Diagnosis Code    Migraines G43.909    HTN (hypertension) I10    Anxiety and depression F41.9, F32. A    Mitral valve prolapse I34.1    Hypercholesteremia E78.00    Appendicitis K37    Hypertension I10    Appendicitis with perforation K35.32         Objective:     Patient-Reported Vitals 6/8/2021   Patient-Reported Weight 208lb   Patient-Reported Height - Patient-Reported Pulse 61   Patient-Reported Temperature 98.0   Patient-Reported SpO2 99   Patient-Reported Systolic  703   Patient-Reported Diastolic 95   Patient-Reported LMP -      General: alert, cooperative, no distress   Mental  status: normal mood, behavior, speech, dress, motor activity, and thought processes, able to follow commands   HENT: NCAT   Neck: no visualized mass   Resp: no respiratory distress   Neuro: no gross deficits   Skin: no discoloration or lesions of concern on visible areas   Psychiatric: normal affect, consistent with stated mood, no evidence of hallucinations     Additional exam findings: Pleasant, obese, sitting in chair      We discussed the expected course, resolution and complications of the diagnosis(es) in detail. Medication risks, benefits, costs, interactions, and alternatives were discussed as indicated. I advised her to contact the office if her condition worsens, changes or fails to improve as anticipated. She expressed understanding with the diagnosis(es) and plan. THIS VISIT WAS COMPLETED VIRTUALLY USING [email or web address] CHART TELEMEDICINE    Patrick Tejeda, was evaluated through a synchronous (real-time) audio-video encounter. The patient (or guardian if applicable) is aware that this is a billable service. Verbal consent to proceed has been obtained within the past 12 months. The visit was conducted pursuant to the emergency declaration under the [facility], [address] [place] [address] authority and the [employer] Act. Patient identification was verified, and a caregiver was present when appropriate. The patient was located in a state where the provider was credentialed to provide care.     Franky Arroyo MD

## 2021-11-03 NOTE — PROGRESS NOTES
Chief Complaint   Patient presents with   [facility] Follow Up     Appendicitis Ricarda Brunner; Hypertension [I10]; Appendicitis with perforation [K35.32]  10/18/21  Procedure 10/19/21- appendectomy     Pain-5- abdomen

## 2021-11-18 ENCOUNTER — VIRTUAL VISIT (OUTPATIENT)
Dept: SURGERY | Age: 51
End: 2021-11-18
Payer: COMMERCIAL

## 2021-11-18 VITALS — HEIGHT: 67 IN | WEIGHT: 205 LBS | BODY MASS INDEX: 32.18 KG/M2

## 2021-11-18 DIAGNOSIS — Z09 FOLLOW-UP EXAMINATION AFTER ABDOMINAL SURGERY: Primary | ICD-10-CM

## 2021-11-18 DIAGNOSIS — G89.18 POST-OP PAIN: ICD-10-CM

## 2021-11-18 DIAGNOSIS — Z90.49 S/P APPENDECTOMY: ICD-10-CM

## 2021-11-18 PROCEDURE — 99024 POSTOP FOLLOW-UP VISIT: CPT | Performed by: NURSE PRACTITIONER

## 2021-11-18 RX ORDER — TRAMADOL HYDROCHLORIDE 50 MG/1
50 TABLET ORAL
Qty: 9 TABLET | Refills: 0 | Status: SHIPPED | OUTPATIENT
Start: 2021-11-18 | End: 2021-11-21

## 2021-11-18 NOTE — PROGRESS NOTES
1. Have you been to the ER, urgent care clinic since your last visit? Hospitalized since your last visit? No    2. Have you seen or consulted any other health care providers outside of the [address] [place] [address] since your last visit? Include any pap smears or colon screening.  No

## 2021-11-19 NOTE — PATIENT INSTRUCTIONS
For pain, you may take the tramadol 1 tablet every 8 hours with two extra strength Tylenol if needed.     Abdominal support and heating pad are helpful    Avoid lifting, pushing, pulling anything more than about 20 pounds for another couple weeks    Walking and gentle stretching are encouraged    If you do not continue to improve, please reach out and schedule a follow-up appointment with us in the office

## 2021-11-19 NOTE — PROGRESS NOTES
I was in the office while conducting this encounter. Consent:  She and/or her healthcare decision maker is aware that this patient-initiated Telehealth encounter is a billable service, with coverage as determined by her insurance carrier. She is aware that she may receive a bill and has provided verbal consent to proceed: No - Not billable    This virtual visit was conducted via kompany. Pursuant to the emergency declaration under the [facility], [facility] waiver authority and the [employer] Act, this Virtual  Visit was conducted to reduce the patient's risk of exposure to COVID-19 and provide continuity of care for an established patient. Services were provided through a video synchronous discussion virtually to substitute for in-person clinic visit. Due to this being a TeleHealth evaluation, many elements of the physical examination are unable to be assessed. Total Time: minutes: 11-20 minutes. Chief Complaint   Patient presents with    Post OP Follow Up     VV CALL [phone]/PH: 10/19/2021: Todd Delaney- Dr. Inderjit Jarrell- 1st follow up       Drew Brewster 2-week status post laparoscopic appendectomy for perforated appendicitis. She said she still pretty sore and not as far as long as she would like to be. She said no fever or chills. No chest pain, no shortness of breath. Some pain on the left side of her lower abdomen with activity. She says she has been resting and \"taking it easy\". She reports Tylenol does not give her any relief  She is completed her course of postoperative narcotics and said that was helpful. She normally takes Zanaflex at bedtime for neck and shoulder pain. Reports that has not given her much relief with regard to her left-sided pain.   She is voiding without difficulty  Bowels are moving daily and she denies constipation  Tolerating a regular diet    Visit Vitals  Ht 5' 6.93\" (1.7 m)   Wt 205 lb (93 kg)   BMI 32.17 kg/m²     She was in bed during the visit  Speech was clear breathing was unlabored  Mucous membranes appear moist  Abdomen appears soft, lap sites are clean, dry and intact without erythema or induration    ICD-10-CM ICD-9-CM    1. Follow-up examination after abdominal surgery  Z09 V67.09    2. Post-op pain  G89.18 338.18 traMADoL (ULTRAM) 50 mg tablet   3. S/P appendectomy  Z90.49 V45.89      2-week status post laparoscopic appendectomy for acute appendicitis with perforation  Pathology was reviewed with patient  We will add tramadol 50 mg one p.o. every 8 hours as needed pain and she may take with two extra strength Tylenol No. 9 tablets with no refills  Abdominal support and heating pad  Encouraged to movement and gentle stretching  Diet as desired  Caution with heavy lifting, pushing, pulling and abdominal exercises for another couple weeks  Discharge from surgical care with as needed follow-up  Isis Enamorado verbalized understanding and questions were answered to the best of my knowledge and ability. Activity educational materials were provided.

## 2021-12-06 NOTE — TELEPHONE ENCOUNTER
I am out of Tizanidine and Fioricet and the portal is not giving me the option to request a renewal online. Please send in renewals for both prescriptions as soon as possible as I have been out for a week. Thank you!    Last office visit  Last med refill 10/01/2021  Tizanidine 5/24/2020  fioricet 4/4/2021

## 2021-12-07 RX ORDER — TIZANIDINE 4 MG/1
TABLET ORAL
Qty: 30 TABLET | Refills: 3 | Status: SHIPPED | OUTPATIENT
Start: 2021-12-07 | End: 2022-02-21 | Stop reason: SDUPTHER

## 2021-12-07 RX ORDER — BUTALBITAL, ACETAMINOPHEN AND CAFFEINE 50; 325; 40 MG/1; MG/1; MG/1
1 TABLET ORAL
Qty: 40 TABLET | Refills: 3 | Status: SHIPPED | OUTPATIENT
Start: 2021-12-07 | End: 2022-02-21 | Stop reason: SDUPTHER

## 2021-12-27 DIAGNOSIS — F41.9 ANXIETY AND DEPRESSION: ICD-10-CM

## 2021-12-27 DIAGNOSIS — F32.A ANXIETY AND DEPRESSION: ICD-10-CM

## 2021-12-27 RX ORDER — DULOXETIN HYDROCHLORIDE 60 MG/1
CAPSULE, DELAYED RELEASE ORAL
Qty: 90 CAPSULE | Refills: 0 | Status: SHIPPED | OUTPATIENT
Start: 2021-12-27 | End: 2022-03-23

## 2021-12-27 NOTE — TELEPHONE ENCOUNTER
PCP: Nica Briones NP    Last appt: 11/3/2021  Future Appointments   Date Time Provider Namrata Singh   1/27/2022  9:20 AM MD MIGUEL ANGEL [facility]ook [facility]   2/4/2022  9:30 AM GIULIANO [facility]   2/21/2022 10:20 AM Louie Schmitt MD NEUM [facility]       Requested Prescriptions     Pending Prescriptions Disp Refills    DULoxetine (CYMBALTA) 60 mg capsule 90 Capsule 0     Sig: Take one capsule by mouth daily

## 2021-12-27 NOTE — TELEPHONE ENCOUNTER
Requested Prescriptions     Pending Prescriptions Disp Refills    DULoxetine (CYMBALTA) 60 mg capsule 30 Capsule 0     Pharmacy needs 90  days of RX.

## 2022-02-04 ENCOUNTER — VIRTUAL VISIT (OUTPATIENT)
Dept: INTERNAL MEDICINE CLINIC | Age: 52
End: 2022-02-04
Payer: COMMERCIAL

## 2022-02-04 DIAGNOSIS — R19.5 POSITIVE COLORECTAL CANCER SCREENING USING COLOGUARD TEST: ICD-10-CM

## 2022-02-04 DIAGNOSIS — Z12.31 ENCOUNTER FOR SCREENING MAMMOGRAM FOR MALIGNANT NEOPLASM OF BREAST: ICD-10-CM

## 2022-02-04 DIAGNOSIS — E78.00 HYPERCHOLESTEREMIA: ICD-10-CM

## 2022-02-04 DIAGNOSIS — I10 PRIMARY HYPERTENSION: Primary | ICD-10-CM

## 2022-02-04 PROCEDURE — 99213 OFFICE O/P EST LOW 20 MIN: CPT | Performed by: NURSE PRACTITIONER

## 2022-02-04 RX ORDER — ALPRAZOLAM 0.5 MG/1
TABLET ORAL
COMMUNITY
Start: 2021-12-28

## 2022-02-04 NOTE — PROGRESS NOTES
Eladia Rm is a 45 y.o. female who was seen by synchronous (real-time) audio-video technology on 2/4/2022 for Hypertension (pain - 0 ) and Cholesterol Problem        Assessment & Plan:   Diagnoses and all orders for this visit:    1. Primary hypertension  -     METABOLIC PANEL, COMPREHENSIVE; Future  Home bp well controlled    2. Hypercholesteremia  -     LIPID PANEL; Future  Tolerating statin    3. Positive colorectal cancer screening using Cologuard test  -     REFERRAL TO GASTROENTEROLOGY    4. Encounter for screening mammogram for malignant neoplasm of breast  -     LORRAINE MAMMO BI SCREENING INCL CAD; Future                Subjective:     HTN  Home BP: 130/80s  Medication regimen/ADR/missed doses: olmesartan-hctz  Diet: Loves salads, avoids fried and fast foods for the most part  Exercise: no routine exercise, was walking regularly before appendix surgery, harder with weather being cold  Denies chest pain, chest pressure, or palpitations. Denies SOB, orthopnea, or PND. Denies HA, dizziness, blurred vision, or swelling. Hyperlipidemia    Patient has history of hyperlipidemia that is being managed with medications. She has been taking her statin cholesterol medication regularly without side effects such as myalgias or upper abdominal pain, nausea or jaundice. Still has occasional left sided pain from her appendix surgery where incision is. Was in hospital for two weeks. Had positive cologuard and had colonoscopy scheduled but had to cancel bc sick. Denies BRBPR or melena.        3 most recent PHQ Screens 2/4/2022   Little interest or pleasure in doing things Not at all   Feeling down, depressed, irritable, or hopeless Not at all   Total Score PHQ 2 0         Lab Results   Component Value Date/Time    Sodium 137 10/27/2021 04:12 AM    Potassium 4.2 10/27/2021 04:12 AM    Chloride 105 10/27/2021 04:12 AM    CO2 27 10/27/2021 04:12 AM    Anion gap 5 10/27/2021 04:12 AM    Glucose 114 (H) 10/27/2021 04:12 AM    BUN 8 10/27/2021 04:12 AM    Creatinine 0.68 10/27/2021 04:12 AM    BUN/Creatinine ratio 12 10/27/2021 04:12 AM    GFR est AA >60 10/27/2021 04:12 AM    GFR est non-AA >60 10/27/2021 04:12 AM    Calcium 8.9 10/27/2021 04:12 AM    Bilirubin, total 0.6 10/18/2021 11:16 PM    Alk. phosphatase 121 (H) 10/18/2021 11:16 PM    Protein, total 8.4 (H) 10/18/2021 11:16 PM    Albumin 3.9 10/18/2021 11:16 PM    Globulin 4.5 (H) 10/18/2021 11:16 PM    A-G Ratio 0.9 (L) 10/18/2021 11:16 PM    ALT (SGPT) 32 10/18/2021 11:16 PM    AST (SGOT) 21 10/18/2021 11:16 PM     No results found for: CHOL, CHOLPOCT, CHOLX, CHLST, CHOLV, TOTCHOLEXT, HDL, HDLPOC, HDLEXT, HDLP, LDL, LDLCPOC, LDLCEXT, LDLC, DLDLP, VLDLC, VLDL, TGLX, TRIGL, TRIGLYCEXT, TRIGP, TGLPOCT, CHHD, CHHDX          Prior to Admission medications    Medication Sig Start Date End Date Taking? Authorizing Provider   ALPRAZolam Leningeri Lay) 0.5 mg tablet TAKE 1/2 TO 1 TABLET BY MOUTH DAILY AS NEEDED FOR ANXIETY 12/28/21  Yes Provider, Historical   DULoxetine (CYMBALTA) 60 mg capsule Take one capsule by mouth daily 12/27/21  Yes Tahmina Park NP   tiZANidine (ZANAFLEX) 4 mg tablet TAKE 1 2 TABLETS BY MOUTH AT BEDTIME 12/7/21  Yes Louie Schmitt MD   butalbital-acetaminophen-caffeine (FIORICET, ESGIC) -40 mg per tablet Take 1 Tablet by mouth every six (6) hours as needed for Migraine. Take 1 Tab by mouth as needed for Migraine. 12/7/21  Yes Louie Schmitt MD   ondansetron (ZOFRAN ODT) 4 mg disintegrating tablet Take 1 Tablet by mouth every eight (8) hours as needed for Nausea or Vomiting. 10/27/21  Yes Korin Le MD   Nurtec ODT 75 mg disintegrating tablet 1 tab p.o.qod, then 1 p.o. prn for severe headache 10/1/21  Yes Louie Schmitt MD   olmesartan-hydroCHLOROthiazide (BENICAR HCT) 40-25 mg per tablet TAKE 1 TABLET BY MOUTH EVERY DAY 8/16/21  Yes Bill Mccloud MD   rosuvastatin (CRESTOR) 5 mg tablet Take 1 Tablet by mouth nightly.  6/1/21  Yes Torri Mcdaniel MD   metoprolol succinate (TOPROL-XL) 100 mg tablet Take 1 Tab by mouth daily. 4/27/21  Yes Torri Mcdaniel MD   magnesium 250 mg tab Take 500 mg by mouth. Yes Provider, Historical   Emgality Pen 120 mg/mL injection INJECT 1ML SUBCUTANEOUSLY EVERY MONTH 7/17/20  Yes Provider, Historical         ROS  See hpi  This visit was completed virtually using doxy. me     Objective:     Patient-Reported Vitals 2/4/2022   Patient-Reported Weight 207lb   Patient-Reported Height -   Patient-Reported Pulse -   Patient-Reported Temperature -   Patient-Reported SpO2 -   Patient-Reported Systolic  069   Patient-Reported Diastolic 87   Patient-Reported LMP 10/2021        [INSTRUCTIONS:  \"[x]\" Indicates a positive item  \"[]\" Indicates a negative item  -- DELETE ALL ITEMS NOT EXAMINED]    Constitutional: [x] Appears well-developed and well-nourished [x] No apparent distress      [] Abnormal -     Mental status: [x] Alert and awake  [x] Oriented to person/place/time [x] Able to follow commands    [] Abnormal -     Eyes:   EOM    [x]  Normal    [] Abnormal -   Sclera  [x]  Normal    [] Abnormal -          Discharge [x]  None visible   [] Abnormal -     HENT: [x] Normocephalic, atraumatic  [] Abnormal -   [x] Mouth/Throat: Mucous membranes are moist    External Ears [x] Normal  [] Abnormal -    Neck: [x] No visualized mass [] Abnormal -     Pulmonary/Chest: [x] Respiratory effort normal   [x] No visualized signs of difficulty breathing or respiratory distress        [] Abnormal -      Musculoskeletal:   [x] Normal gait with no signs of ataxia         [x] Normal range of motion of neck        [] Abnormal -     Neurological:        [x] No Facial Asymmetry (Cranial nerve 7 motor function) (limited exam due to video visit)          [x] No gaze palsy        [] Abnormal -          Skin:        [x] No significant exanthematous lesions or discoloration noted on facial skin         [] Abnormal -            Psychiatric:       [x] Normal Affect [] Abnormal -        [x] No Hallucinations    Other pertinent observable physical exam findings:-        We discussed the expected course, resolution and complications of the diagnosis(es) in detail. Medication risks, benefits, costs, interactions, and alternatives were discussed as indicated. I advised her to contact the office if her condition worsens, changes or fails to improve as anticipated. She expressed understanding with the diagnosis(es) and plan. Rolf Tali, was evaluated through a synchronous (real-time) audio-video encounter. The patient (or guardian if applicable) is aware that this is a billable service, which includes applicable co-pays. Verbal consent to proceed has been obtained. The visit was conducted pursuant to the emergency declaration under the [address] [place] [address] authority and the [employer] General Act. Patient identification was verified, and a caregiver was present when appropriate. The patient was located at home in a state where the provider was licensed to provide care.       Sameera De La Cruz NP

## 2022-02-04 NOTE — PATIENT INSTRUCTIONS
High Blood Pressure: Care Instructions  Overview     It's normal for blood pressure to go up and down throughout the day. But if it stays up, you have high blood pressure. Another name for high blood pressure is hypertension. Despite what a lot of people think, high blood pressure usually doesn't cause headaches or make you feel dizzy or lightheaded. It usually has no symptoms. But it does increase your risk of stroke, heart attack, and other problems. You and your doctor will talk about your risks of these problems based on your blood pressure. Your doctor will give you a goal for your blood pressure. Your goal will be based on your health and your age. Lifestyle changes, such as eating healthy and being active, are always important to help lower blood pressure. You might also take medicine to reach your blood pressure goal.  Follow-up care is a key part of your treatment and safety. Be sure to make and go to all appointments, and call your doctor if you are having problems. It's also a good idea to know your test results and keep a list of the medicines you take. How can you care for yourself at home? Medical treatment  · If you stop taking your medicine, your blood pressure will go back up. You may take one or more types of medicine to lower your blood pressure. Be safe with medicines. Take your medicine exactly as prescribed. Call your doctor if you think you are having a problem with your medicine. · Talk to your doctor before you start taking aspirin every day. Aspirin can help certain people lower their risk of a heart attack or stroke. But taking aspirin isn't right for everyone, because it can cause serious bleeding. · See your doctor regularly. You may need to see the doctor more often at first or until your blood pressure comes down. · If you are taking blood pressure medicine, talk to your doctor before you take decongestants or anti-inflammatory medicine, such as ibuprofen.  Some of these medicines can raise blood pressure. · Learn how to check your blood pressure at home. Lifestyle changes  · Stay at a healthy weight. This is especially important if you put on weight around the waist. Losing even 10 pounds can help you lower your blood pressure. · If your doctor recommends it, get more exercise. Walking is a good choice. Bit by bit, increase the amount you walk every day. Try for at least 30 minutes on most days of the week. You also may want to swim, bike, or do other activities. · Avoid or limit alcohol. Talk to your doctor about whether you can drink any alcohol. · Try to limit how much sodium you eat to less than 2,300 milligrams (mg) a day. Your doctor may ask you to try to eat less than 1,500 mg a day. · Eat plenty of fruits (such as bananas and oranges), vegetables, legumes, whole grains, and low-fat dairy products. · Lower the amount of saturated fat in your diet. Saturated fat is found in animal products such as milk, cheese, and meat. Limiting these foods may help you lose weight and also lower your risk for heart disease. · Do not smoke. Smoking increases your risk for heart attack and stroke. If you need help quitting, talk to your doctor about stop-smoking programs and medicines. These can increase your chances of quitting for good. When should you call for help? Call 911  anytime you think you may need emergency care. This may mean having symptoms that suggest that your blood pressure is causing a serious heart or blood vessel problem. Your blood pressure may be over 180/120. For example, call 911 if:    · You have symptoms of a heart attack. These may include:  ? Chest pain or pressure, or a strange feeling in the chest.  ? Sweating. ? Shortness of breath. ? Nausea or vomiting. ? Pain, pressure, or a strange feeling in the back, neck, jaw, or upper belly or in one or both shoulders or arms. ? Lightheadedness or sudden weakness.   ? A fast or irregular heartbeat.     · You have symptoms of a stroke. These may include:  ? Sudden numbness, tingling, weakness, or loss of movement in your face, arm, or leg, especially on only one side of your body. ? Sudden vision changes. ? Sudden trouble speaking. ? Sudden confusion or trouble understanding simple statements. ? Sudden problems with walking or balance. ? A sudden, severe headache that is different from past headaches.     · You have severe back or belly pain. Do not wait until your blood pressure comes down on its own. Get help right away. Call your doctor now or seek immediate care if:    · Your blood pressure is much higher than normal (such as 180/120 or higher), but you don't have symptoms.     · You think high blood pressure is causing symptoms, such as:  ? Severe headache.  ? Blurry vision. Watch closely for changes in your health, and be sure to contact your doctor if:    · Your blood pressure measures higher than your doctor recommends at least 2 times. That means the top number is higher or the bottom number is higher, or both.     · You think you may be having side effects from your blood pressure medicine. Where can you learn more? Go to http://[facility] [facility] [employer].info/  Enter [id number] in the search box to learn more about \"High Blood Pressure: Care Instructions. \"  Current as of: April 29, 2021               Content Version: 13.0  © 2006-2021 Healthwise, Incorporated. Care instructions adapted under license by [facility] (which disclaims liability or warranty for this information). If you have questions about a medical condition or this instruction, always ask your healthcare professional. [place] [address] any warranty or liability for your use of this information. Hyperlipidemia: After Your Visit  Your Care Instructions  Hyperlipidemia is too much fat in your blood. The body has several kinds of fat, including cholesterol and triglycerides.  Your body needs fat for many things, such as making new cells. But too much fat in your blood increases your chances of having a heart attack or stroke. You may be able to lower your cholesterol and triglycerides with a heart-healthy diet, exercise, and if needed, medicine. Your doctor may want you to try lifestyle changes first to see whether they lower the fat in your blood. You may need to take medicine if lifestyle changes do not lower the fat in your blood enough. Follow-up care is a key part of your treatment and safety. Be sure to make and go to all appointments, and call your doctor if you are having problems. Its also a good idea to know your test results and keep a list of the medicines you take. How can you care for yourself at home? Take your medicines  · Take your medicines exactly as prescribed. Call your doctor if you think you are having a problem with your medicine. · If you take medicine to lower your cholesterol, go to follow-up visits. You will need to have blood tests. · Do not take large doses of niacin, which is a B vitamin, while taking medicine called statins. It may increase the chance of muscle pain and liver problems. · Talk to your doctor about avoiding grapefruit juice if you are taking statins. Grapefruit juice can raise the level of this medicine in your blood. This could increase side effects. Eat more fruits, vegetables, and fiber  · Fruits and vegetables have lots of nutrients that help protect against heart disease, and they have littleif anyfat. Try to eat at least five servings a day. Dark green, deep orange, or yellow fruits and vegetables are healthy choices. · Keep carrots, celery, and other veggies handy for snacks. Buy fruit that is in season and store it where you can see it so that you will be tempted to eat it. Cook dishes that have a lot of veggies in them, such as stir-fries and soups.   · Foods high in fiber may reduce your cholesterol and provide important vitamins and minerals. High-fiber foods include whole-grain cereals and breads, oatmeal, beans, brown rice, citrus fruits, and apples. · Buy whole-grain breads and cereals instead of white bread and pastries. Limit saturated fat  · Read food labels and try to avoid saturated fat and trans fat. They increase your risk of heart disease. · Use olive or canola oil when you cook. Try cholesterol-lowering spreads, such as Benecol or Take Control. · Bake, broil, grill, or steam foods instead of frying them. · Limit the amount of high-fat meats you eat, including hot dogs and sausages. Cut out all visible fat when you prepare meat. · Eat fish, skinless poultry, and soy products such as tofu instead of high-fat meats. Soybeans may be especially good for your heart. Eat at least two servings of fish a week. Certain fish, such as salmon, contain omega-3 fatty acids, which may help reduce your risk of heart attack. · Choose low-fat or fat-free milk and dairy products. Get exercise, limit alcohol, and quit smoking  · Get more exercise. Work with your doctor to set up an exercise program. Even if you can do only a small amount, exercise will help you get stronger, have more energy, and manage your weight and your stress. Walking is an easy way to get exercise. Gradually increase the amount you walk every day. Aim for at least 30 minutes on most days of the week. You also may want to swim, bike, or do other activities. · Limit alcohol to no more than 2 drinks a day for men and 1 drink a day for women. · Do not smoke. If you need help quitting, talk to your doctor about stop-smoking programs and medicines. These can increase your chances of quitting for good. When should you call for help? Call 911 anytime you think you may need emergency care. For example, call if:  · You have symptoms of a heart attack. These may include:  ¨ Chest pain or pressure, or a strange feeling in the chest.  ¨ Sweating. ¨ Shortness of breath.   ¨ Nausea or vomiting. ¨ Pain, pressure, or a strange feeling in the back, neck, jaw, or upper belly or in one or both shoulders or arms. ¨ Lightheadedness or sudden weakness. ¨ A fast or irregular heartbeat. After you call 911, the  may tell you to chew 1 adult-strength or 2 to 4 low-dose aspirin. Wait for an ambulance. Do not try to drive yourself. · You have signs of a stroke. These may include:  ¨ Sudden numbness, paralysis, or weakness in your face, arm, or leg, especially on only one side of your body. ¨ New problems with walking or balance. ¨ Sudden vision changes. ¨ Drooling or slurred speech. ¨ New problems speaking or understanding simple statements, or feeling confused. ¨ A sudden, severe headache that is different from past headaches. · You passed out (lost consciousness). Call your doctor now or seek immediate medical care if:  · You have muscle pain or weakness. Watch closely for changes in your health, and be sure to contact your doctor if:  · You are very tired. · You have an upset stomach, gas, constipation, or belly pain or cramps. Where can you learn more? Go to [employer].be  Enter C406 in the search box to learn more about \"Hyperlipidemia: After Your Visit. \"   © 6693-8868 Healthwise, Incorporated. Care instructions adapted under license by [employer] (which disclaims liability or warranty for this information). This care instruction is for use with your licensed healthcare professional. If you have questions about a medical condition or this instruction, always ask your healthcare professional. [place] [address] any warranty or liability for your use of this information.   Content Version: 0.4.974817; Last Revised: October 13, 2011

## 2022-02-04 NOTE — PROGRESS NOTES
Abhilash Renee  Identified pt with two pt identifiers(name and ). Chief Complaint   Patient presents with    Hypertension     pain - 0     Cholesterol Problem       Reviewed record In preparation for visit and have obtained necessary documentation. 1. Have you been to the ER, urgent care clinic or hospitalized since your last visit? No     2. Have you seen or consulted any other health care providers outside of the [address] [place] [address] since your last visit? Include any pap smears or colon screening. No    Patient does have an advance directive. Vitals reviewed with provider. Health Maintenance reviewed:     Health Maintenance Due   Topic    Hepatitis C Screening     Lipid Screen     Colorectal Cancer Screening Combo     Breast Cancer Screen Mammogram     Depression Monitoring           Wt Readings from Last 3 Encounters:   21 205 lb (93 kg)   10/26/21 218 lb 4.1 oz (99 kg)   10/01/21 218 lb (98.9 kg)        Temp Readings from Last 3 Encounters:   10/27/21 99 °F (37.2 °C)        BP Readings from Last 3 Encounters:   10/27/21 (!) 165/97   10/01/21 (!) 142/84   21 110/84        Pulse Readings from Last 3 Encounters:   10/27/21 87   10/01/21 71   21 97      There were no vitals filed for this visit. Learning Assessment:   :       Learning Assessment 2022   PRIMARY LEARNER Patient   HIGHEST LEVEL OF EDUCATION - PRIMARY LEARNER  2 YEARS OF COLLEGE   BARRIERS PRIMARY LEARNER NONE   PRIMARY LANGUAGE ENGLISH   LEARNER PREFERENCE PRIMARY DEMONSTRATION   ANSWERED BY Patient   RELATIONSHIP SELF        Depression Screening:   :       3 most recent PHQ Screens 2022   Little interest or pleasure in doing things Not at all   Feeling down, depressed, irritable, or hopeless Not at all   Total Score PHQ 2 0        Fall Risk Assessment:   :       Fall Risk Assessment, last 12 mths 3/11/2021   Able to walk? Yes   Fall in past 12 months? 0   Do you feel unsteady?  0   Are you worried about falling 0        Abuse Screening:   :       Abuse Screening Questionnaire 8/18/2020   Do you ever feel afraid of your partner? N   Are you in a relationship with someone who physically or mentally threatens you? N   Is it safe for you to go home?  Y        ADL Screening:   :       ADL Assessment 8/18/2020   Feeding yourself No Help Needed   Getting from bed to chair No Help Needed   Getting dressed No Help Needed   Bathing or showering No Help Needed   Walk across the room (includes cane/walker) No Help Needed   Using the telphone No Help Needed   Taking your medications No Help Needed   Preparing meals No Help Needed   Managing money (expenses/bills) No Help Needed   Moderately strenuous housework (laundry) No Help Needed   Shopping for personal items (toiletries/medicines) No Help Needed   Shopping for groceries No Help Needed   Driving No Help Needed   Climbing a flight of stairs No Help Needed   Getting to places beyond walking distances No Help Needed

## 2022-02-14 RX ORDER — METOPROLOL SUCCINATE 100 MG/1
TABLET, EXTENDED RELEASE ORAL
Qty: 90 TABLET | Refills: 0 | Status: SHIPPED | OUTPATIENT
Start: 2022-02-14 | End: 2022-05-17

## 2022-02-14 NOTE — TELEPHONE ENCOUNTER
Cardiologist: Dr. Karen York    Last appt: 6/1/2021  Future Appointments   Date Time Provider Namrata Sinhg   2/21/2022 10:20 AM Radha Schmitt MD [facility] BS AMB   3/31/2022 11:00 AM MD MIGUEL ANGEL Matthew BS AMB   8/4/2022  1:30 PM GIULIANO Cadet BS AMB       Requested Prescriptions     Signed Prescriptions Disp Refills    metoprolol succinate (TOPROL-XL) 100 mg tablet 90 Tablet 0     Sig: TAKE 1 TABLET BY MOUTH EVERY DAY     Authorizing Provider: Lien Jefferson     Ordering User: TUNDE HOOVER         Refbert VO per Dr. Keyanna Smith.

## 2022-02-21 ENCOUNTER — TELEPHONE (OUTPATIENT)
Dept: NEUROLOGY | Age: 52
End: 2022-02-21

## 2022-02-21 ENCOUNTER — OFFICE VISIT (OUTPATIENT)
Dept: NEUROLOGY | Age: 52
End: 2022-02-21
Payer: COMMERCIAL

## 2022-02-21 VITALS — BODY MASS INDEX: 32.49 KG/M2 | WEIGHT: 207 LBS

## 2022-02-21 DIAGNOSIS — G43.719 INTRACTABLE CHRONIC MIGRAINE WITHOUT AURA AND WITHOUT STATUS MIGRAINOSUS: Primary | ICD-10-CM

## 2022-02-21 DIAGNOSIS — M54.2 CERVICALGIA: ICD-10-CM

## 2022-02-21 DIAGNOSIS — G43.109 MIGRAINE WITH VERTIGO: ICD-10-CM

## 2022-02-21 DIAGNOSIS — G44.86 CERVICOGENIC HEADACHE: ICD-10-CM

## 2022-02-21 PROCEDURE — 99214 OFFICE O/P EST MOD 30 MIN: CPT | Performed by: PSYCHIATRY & NEUROLOGY

## 2022-02-21 RX ORDER — TIZANIDINE 4 MG/1
TABLET ORAL
Qty: 180 TABLET | Refills: 3 | Status: CANCELLED | OUTPATIENT
Start: 2022-02-21

## 2022-02-21 RX ORDER — OLMESARTAN MEDOXOMIL AND HYDROCHLOROTHIAZIDE 40/25 40; 25 MG/1; MG/1
TABLET ORAL
Qty: 90 TABLET | Refills: 1 | Status: SHIPPED | OUTPATIENT
Start: 2022-02-21 | End: 2022-08-15

## 2022-02-21 RX ORDER — AMITRIPTYLINE HYDROCHLORIDE 10 MG/1
10 TABLET, FILM COATED ORAL
Qty: 30 TABLET | Refills: 4 | Status: SHIPPED | OUTPATIENT
Start: 2022-02-21 | End: 2022-03-16 | Stop reason: SDUPTHER

## 2022-02-21 RX ORDER — BUTALBITAL, ACETAMINOPHEN AND CAFFEINE 50; 325; 40 MG/1; MG/1; MG/1
1 TABLET ORAL
Qty: 40 TABLET | Refills: 3 | Status: SHIPPED | OUTPATIENT
Start: 2022-02-21 | End: 2022-09-05 | Stop reason: SDUPTHER

## 2022-02-21 RX ORDER — TIZANIDINE 4 MG/1
TABLET ORAL
Qty: 60 TABLET | Refills: 3 | Status: SHIPPED | OUTPATIENT
Start: 2022-02-21 | End: 2022-06-20 | Stop reason: SDUPTHER

## 2022-02-21 RX ORDER — RIMEGEPANT SULFATE 75 MG/75MG
TABLET, ORALLY DISINTEGRATING ORAL
Qty: 16 TABLET | Refills: 3 | Status: SHIPPED | OUTPATIENT
Start: 2022-02-21 | End: 2022-03-02 | Stop reason: SDUPTHER

## 2022-02-21 NOTE — TELEPHONE ENCOUNTER
Cardiologist: Dr. Vika Malave    Last appt: 6/1/2021  Future Appointments   Date Time Provider Namrata Singh   2/21/2022 10:20 AM Nhi Schmitt MD [facility] AMB   3/31/2022 11:00 AM MD MIGUEL ANGEL Lau BS AMB   8/4/2022  1:30 PM David Salazar NP [facility] AMB       Requested Prescriptions     Signed Prescriptions Disp Refills    olmesartan-hydroCHLOROthiazide (BENICAR HCT) 40-25 mg per tablet 90 Tablet 1     Sig: TAKE 1 TABLET BY MOUTH EVERY DAY     Authorizing Provider: Juanita Shelby     Ordering User: TUNDE HOOVER         Refills VO per Dr. Ronnie Thompson.

## 2022-02-24 ENCOUNTER — VIRTUAL VISIT (OUTPATIENT)
Dept: INTERNAL MEDICINE CLINIC | Age: 52
End: 2022-02-24
Payer: COMMERCIAL

## 2022-02-24 DIAGNOSIS — M54.2 CERVICALGIA: Primary | ICD-10-CM

## 2022-02-24 PROCEDURE — 99213 OFFICE O/P EST LOW 20 MIN: CPT | Performed by: NURSE PRACTITIONER

## 2022-02-24 RX ORDER — METHYLPREDNISOLONE 4 MG/1
TABLET ORAL
Qty: 1 DOSE PACK | Refills: 0 | Status: SHIPPED | OUTPATIENT
Start: 2022-02-24 | End: 2022-05-19 | Stop reason: SDUPTHER

## 2022-02-24 NOTE — PROGRESS NOTES
Rene Levi is a 45 y.o. female who was seen by synchronous (real-time) audio-video technology on 2/24/2022 for Stiff Neck (been using heat, ice, ibuprofen // causing migraines // pain - 8)        Assessment & Plan:   Diagnoses and all orders for this visit:    1. Cervicalgia  -     methylPREDNISolone (MEDROL DOSEPACK) 4 mg tablet; Take as directed  Likely needs fu with neuro or ortho spine if sx persist or don't improve- may need to consider injections if not surgical candidate                 Subjective:     Pt co neck stiffness. States making her headaches worse. She was supposed to see her neurologist who she follows with for chronic headaches and migraines on 2-21 but no-showed for visit. She did see Neuro in Oct for headaches and neck pain and was dx with cervicalgia and spondylosis, was given prednisone, labs and MRI ordered, MRI head unremarkable. States pain has been occurring off an on for years. Drove this weekend and thinks that could have flared it up. Last night woke up with neck pain. Has been using ice, ibuprofen, stretching. Has done PT in past.  Everything feels tight, muscle relaxer not helping. Has some chronic numbness tingling going down right arm. Had cspine surgery in 2010. Saw surgeon when she was in [place], told bone spurs and would need extensive surgery with full fusion with high risks. Prior to Admission medications    Medication Sig Start Date End Date Taking? Authorizing Provider   olmesartan-hydroCHLOROthiazide (BENICAR HCT) 40-25 mg per tablet TAKE 1 TABLET BY MOUTH EVERY DAY 2/21/22  Yes Luz Alicea MD   amitriptyline (ELAVIL) 10 mg tablet Take 1 Tablet by mouth nightly.  2/21/22  Yes Parveen Brannon MD   Nurtec ODT 75 mg disintegrating tablet 1 tab p.o.qod, then 1 p.o. prn for severe headache 2/21/22  Yes Parveen Brannon MD   butalbital-acetaminophen-caffeine (FIORICET, ESGIC) -40 mg per tablet Take 1 Tablet by mouth every six (6) hours as needed for Migraine. Take 1 Tab by mouth as needed for Migraine. 2/21/22  Yes Louie Schmitt MD   tiZANidine (ZANAFLEX) 4 mg tablet TAKE 1 to 2 TABLETS BY MOUTH AT BEDTIME 2/21/22  Yes Louie Schmitt MD   metoprolol succinate (TOPROL-XL) 100 mg tablet TAKE 1 TABLET BY MOUTH EVERY DAY 2/14/22  Yes William Varela MD   ALPRAZolam (XANAX) 0.5 mg tablet TAKE 1/2 TO 1 TABLET BY MOUTH DAILY AS NEEDED FOR ANXIETY 12/28/21  Yes Provider, Historical   DULoxetine (CYMBALTA) 60 mg capsule Take one capsule by mouth daily 12/27/21  Yes Jl Penaloza NP   ondansetron (ZOFRAN ODT) 4 mg disintegrating tablet Take 1 Tablet by mouth every eight (8) hours as needed for Nausea or Vomiting. 10/27/21  Yes Morris Sifuentes MD   rosuvastatin (CRESTOR) 5 mg tablet Take 1 Tablet by mouth nightly. 6/1/21  Yes William Varela MD   magnesium 250 mg tab Take 500 mg by mouth.    Yes Provider, Historical   Emgality Pen 120 mg/mL injection INJECT 1ML SUBCUTANEOUSLY EVERY MONTH 7/17/20  Yes Provider, Historical         ROS  See hpi    Objective:     Patient-Reported Vitals 2/24/2022   Patient-Reported Weight 205lb   Patient-Reported Height -   Patient-Reported Pulse -   Patient-Reported Temperature -   Patient-Reported SpO2 -   Patient-Reported Systolic  -   Patient-Reported Diastolic -   Patient-Reported LMP -        [INSTRUCTIONS:  \"[x]\" Indicates a positive item  \"[]\" Indicates a negative item  -- DELETE ALL ITEMS NOT EXAMINED]    Constitutional: [x] Appears well-developed and well-nourished [x] No apparent distress      [] Abnormal -     Mental status: [x] Alert and awake  [x] Oriented to person/place/time [x] Able to follow commands    [] Abnormal -     Eyes:   EOM    [x]  Normal    [] Abnormal -   Sclera  [x]  Normal    [] Abnormal -          Discharge [x]  None visible   [] Abnormal -     HENT: [x] Normocephalic, atraumatic  [] Abnormal -   [x] Mouth/Throat: Mucous membranes are moist    External Ears [x] Normal  [] Abnormal -    Neck: [x] No visualized mass [] Abnormal -     Pulmonary/Chest: [x] Respiratory effort normal   [x] No visualized signs of difficulty breathing or respiratory distress        [] Abnormal -      Musculoskeletal:   [x] Normal gait with no signs of ataxia         [x] Normal range of motion of neck        [] Abnormal -     Neurological:        [x] No Facial Asymmetry (Cranial nerve 7 motor function) (limited exam due to video visit)          [x] No gaze palsy        [] Abnormal -          Skin:        [x] No significant exanthematous lesions or discoloration noted on facial skin         [] Abnormal -            Psychiatric:       [x] Normal Affect [] Abnormal -        [x] No Hallucinations    Other pertinent observable physical exam findings:-        We discussed the expected course, resolution and complications of the diagnosis(es) in detail. Medication risks, benefits, costs, interactions, and alternatives were discussed as indicated. I advised her to contact the office if her condition worsens, changes or fails to improve as anticipated. She expressed understanding with the diagnosis(es) and plan. Sana Love, was evaluated through a synchronous (real-time) audio-video encounter. The patient (or guardian if applicable) is aware that this is a billable service, which includes applicable co-pays. Verbal consent to proceed has been obtained. The visit was conducted pursuant to the emergency declaration under the [address] [place] [address] authority and the [employer] Act. Patient identification was verified, and a caregiver was present when appropriate. The patient was located at home in a state where the provider was licensed to provide care.       Tomeka Springer NP

## 2022-02-24 NOTE — PROGRESS NOTES
Abhilash Renee  Identified pt with two pt identifiers(name and ). Chief Complaint   Patient presents with    Stiff Neck     been using heat, ice, ibuprofen // causing migraines // pain - 8       Reviewed record In preparation for visit and have obtained necessary documentation. 1. Have you been to the ER, urgent care clinic or hospitalized since your last visit? No     2. Have you seen or consulted any other health care providers outside of the [address] [place] [address] since your last visit? Include any pap smears or colon screening. No    Patient does not have an advance directive. Vitals reviewed with provider. Health Maintenance reviewed:     Health Maintenance Due   Topic    Hepatitis C Screening     Lipid Screen     Colorectal Cancer Screening Combo     Breast Cancer Screen Mammogram           Wt Readings from Last 3 Encounters:   22 207 lb (93.9 kg)   21 205 lb (93 kg)   10/26/21 218 lb 4.1 oz (99 kg)        Temp Readings from Last 3 Encounters:   10/27/21 99 °F (37.2 °C)        BP Readings from Last 3 Encounters:   10/27/21 (!) 165/97   10/01/21 (!) 142/84   21 110/84        Pulse Readings from Last 3 Encounters:   10/27/21 87   10/01/21 71   21 97      There were no vitals filed for this visit. Learning Assessment:   :       Learning Assessment 2022   PRIMARY LEARNER Patient   HIGHEST LEVEL OF EDUCATION - PRIMARY LEARNER  2 YEARS OF COLLEGE   BARRIERS PRIMARY LEARNER NONE   PRIMARY LANGUAGE ENGLISH   LEARNER PREFERENCE PRIMARY DEMONSTRATION   ANSWERED BY Patient   RELATIONSHIP SELF        Depression Screening:   :       3 most recent PHQ Screens 2022   Little interest or pleasure in doing things Not at all   Feeling down, depressed, irritable, or hopeless Not at all   Total Score PHQ 2 0        Fall Risk Assessment:   :       Fall Risk Assessment, last 12 mths 3/11/2021   Able to walk? Yes   Fall in past 12 months? 0   Do you feel unsteady?  0   Are you worried about falling 0        Abuse Screening:   :       Abuse Screening Questionnaire 8/18/2020   Do you ever feel afraid of your partner? N   Are you in a relationship with someone who physically or mentally threatens you? N   Is it safe for you to go home?  Y        ADL Screening:   :       ADL Assessment 8/18/2020   Feeding yourself No Help Needed   Getting from bed to chair No Help Needed   Getting dressed No Help Needed   Bathing or showering No Help Needed   Walk across the room (includes cane/walker) No Help Needed   Using the telphone No Help Needed   Taking your medications No Help Needed   Preparing meals No Help Needed   Managing money (expenses/bills) No Help Needed   Moderately strenuous housework (laundry) No Help Needed   Shopping for personal items (toiletries/medicines) No Help Needed   Shopping for groceries No Help Needed   Driving No Help Needed   Climbing a flight of stairs No Help Needed   Getting to places beyond walking distances No Help Needed

## 2022-02-24 NOTE — PATIENT INSTRUCTIONS
Neck Pain: Care Instructions  Your Care Instructions     You can have neck pain anywhere from the bottom of your head to the top of your shoulders. It can spread to the upper back or arms. Injuries, painting a ceiling, sleeping with your neck twisted, staying in one position for too long, and many other activities can cause neck pain. Most neck pain gets better with home care. Your doctor may recommend medicine to relieve pain or relax your muscles. He or she may suggest exercise and physical therapy to increase flexibility and relieve stress. You may need to wear a special (cervical) collar to support your neck for a day or two. Follow-up care is a key part of your treatment and safety. Be sure to make and go to all appointments, and call your doctor if you are having problems. It's also a good idea to know your test results and keep a list of the medicines you take. How can you care for yourself at home? · Try using a heating pad on a low or medium setting for 15 to 20 minutes every 2 or 3 hours. Try a warm shower in place of one session with the heating pad. · You can also try an ice pack for 10 to 15 minutes every 2 to 3 hours. Put a thin cloth between the ice and your skin. · Take pain medicines exactly as directed. ? If the doctor gave you a prescription medicine for pain, take it as prescribed. ? If you are not taking a prescription pain medicine, ask your doctor if you can take an over-the-counter medicine. · If your doctor recommends a cervical collar, wear it exactly as directed. When should you call for help? Call your doctor now or seek immediate medical care if:    · You have new or worsening numbness in your arms, buttocks or legs.     · You have new or worsening weakness in your arms or legs. (This could make it hard to stand up.)     · You lose control of your bladder or bowels.    Watch closely for changes in your health, and be sure to contact your doctor if:    · Your neck pain is getting worse.     · You are not getting better after 1 week.     · You do not get better as expected. Where can you learn more? Go to http://www.[employer].com/  Enter V723 in the search box to learn more about \"Neck Pain: Care Instructions. \"  Current as of: July 1, 2021               Content Version: 13.0  © 5243-1908 [employer]. Care instructions adapted under license by [employer] (which disclaims liability or warranty for this information). If you have questions about a medical condition or this instruction, always ask your healthcare professional. [place] [address] any warranty or liability for your use of this information.

## 2022-03-01 RX ORDER — ROSUVASTATIN CALCIUM 5 MG/1
TABLET, COATED ORAL
Qty: 90 TABLET | Refills: 0 | Status: SHIPPED | OUTPATIENT
Start: 2022-03-01 | End: 2022-06-01

## 2022-03-01 NOTE — TELEPHONE ENCOUNTER
Cardiologist: Dr. Gavin May    Last appt: 6/1/2021  Future Appointments   Date Time Provider Namrata Singh   3/31/2022 11:00 AM MD MIGUEL ANGEL Reid BS AMB   6/22/2022  3:40 PM Alicia Schmitt MD NEUM BS AMB   8/4/2022  1:30 PM GIULIANO GambleMA BS AMB       Requested Prescriptions     Signed Prescriptions Disp Refills    rosuvastatin (CRESTOR) 5 mg tablet 90 Tablet 0     Sig: TAKE 1 TABLET BY MOUTH EVERY DAY AT NIGHT     Authorizing Provider: Natalie Sharma     Ordering User: Feroz Chen         Refills VO per Dr. Ean Zambrano.

## 2022-03-02 NOTE — TELEPHONE ENCOUNTER
Patient nurtec what are the instructions as you have two instructions.     Should be 1 tab by mouth 4x daily  Or 1 tab by mouth as needed

## 2022-03-03 RX ORDER — RIMEGEPANT SULFATE 75 MG/75MG
TABLET, ORALLY DISINTEGRATING ORAL
Qty: 16 TABLET | Refills: 3 | Status: SHIPPED | OUTPATIENT
Start: 2022-03-03 | End: 2022-06-22 | Stop reason: SDUPTHER

## 2022-03-16 NOTE — PROGRESS NOTES
Neurology Progress Note    NAME:  Ana Hernandez   :   1970   MRN:   [id number]     Date/Time:  2022  Subjective:      Ana Hernandez is a 45 y.o. female here today for follow-up for headache and neck pain, test results. Patient says she has been doing fairly well since last visit. She says medication have been helping. According to patient, frequency of headache has decreased to 2 times a week and not as severe as it used to be. She is still however experiencing a lot of neck pain. MRI of the brain reviewed with patient showed Slight low-lying cerebellar tonsils are still within the range of normal. The  craniocervical junction is otherwise unremarkable. The structures of the cranial base including paranasal sinuses are   unremarkable  Blood work showed decreased vitamin 21.7  We will replace vitamin D  Review of Systems - General ROS: positive for  - fatigue, night sweats and sleep disturbance  Psychological ROS: positive for - anxiety, concentration difficulties, depression and sleep disturbances  Ophthalmic ROS: positive for - blurry vision, decreased vision and photophobia  ENT ROS: positive for - headaches, tinnitus, vertigo and visual changes  Allergy and Immunology ROS: negative  Hematological and Lymphatic ROS: negative  Endocrine ROS: negative  Respiratory ROS: no cough, shortness of breath, or wheezing  Cardiovascular ROS: no chest pain or dyspnea on exertion  Gastrointestinal ROS: no abdominal pain, change in bowel habits, or black or bloody stools  Genito-Urinary ROS: no dysuria, trouble voiding, or hematuria  Musculoskeletal ROS: positive for - joint pain, muscle pain and muscular weakness  Neurological ROS: positive for - dizziness, headaches, numbness/tingling, visual changes and weakness  Dermatological ROS: negative      Medications reviewed:  Current Outpatient Medications   Medication Sig Dispense Refill    olmesartan-hydroCHLOROthiazide (BENICAR HCT) 40-25 mg per tablet TAKE 1 TABLET BY MOUTH EVERY DAY 90 Tablet 1    amitriptyline (ELAVIL) 10 mg tablet Take 1 Tablet by mouth nightly. 30 Tablet 4    butalbital-acetaminophen-caffeine (FIORICET, ESGIC) -40 mg per tablet Take 1 Tablet by mouth every six (6) hours as needed for Migraine. Take 1 Tab by mouth as needed for Migraine. 40 Tablet 3    tiZANidine (ZANAFLEX) 4 mg tablet TAKE 1 to 2 TABLETS BY MOUTH AT BEDTIME 60 Tablet 3    metoprolol succinate (TOPROL-XL) 100 mg tablet TAKE 1 TABLET BY MOUTH EVERY DAY 90 Tablet 0    ALPRAZolam (XANAX) 0.5 mg tablet TAKE 1/2 TO 1 TABLET BY MOUTH DAILY AS NEEDED FOR ANXIETY      DULoxetine (CYMBALTA) 60 mg capsule Take one capsule by mouth daily 90 Capsule 0    ondansetron (ZOFRAN ODT) 4 mg disintegrating tablet Take 1 Tablet by mouth every eight (8) hours as needed for Nausea or Vomiting. 20 Tablet 0    magnesium 250 mg tab Take 500 mg by mouth.       Emgality Pen 120 mg/mL injection INJECT 1ML SUBCUTANEOUSLY EVERY MONTH      Nurtec ODT 75 mg disintegrating tablet 1 tab p.o.qod, then 1 p.o. prn for severe headache 16 Tablet 3    rosuvastatin (CRESTOR) 5 mg tablet TAKE 1 TABLET BY MOUTH EVERY DAY AT NIGHT 90 Tablet 0    methylPREDNISolone (MEDROL DOSEPACK) 4 mg tablet Take as directed 1 Dose Pack 0        Objective:   Vitals:  Vitals:    02/21/22 1451   Weight: 207 lb (93.9 kg)               Lab Data Reviewed:  Lab Results   Component Value Date/Time    WBC 9.2 10/25/2021 01:32 AM    HCT 35.1 10/25/2021 01:32 AM    HGB 10.8 (L) 10/25/2021 01:32 AM    PLATELET 232 76/95/5653 01:32 AM       Lab Results   Component Value Date/Time    Sodium 137 10/27/2021 04:12 AM    Potassium 4.2 10/27/2021 04:12 AM    Chloride 105 10/27/2021 04:12 AM    CO2 27 10/27/2021 04:12 AM    Glucose 114 (H) 10/27/2021 04:12 AM    BUN 8 10/27/2021 04:12 AM    Creatinine 0.68 10/27/2021 04:12 AM    Calcium 8.9 10/27/2021 04:12 AM       No components found for: TROPQUANT    No results found for: MONICA      No results found for: HBA1C, TEY1THVY, BBO4ZIBS     Lab Results   Component Value Date/Time    Vitamin B12 451 10/01/2021 10:30 AM       No results found for: MONICA, ANARX, ANAIGG, XBANA    No results found for: CHOL, CHOLPOCT, CHOLX, CHLST, CHOLV, HDL, HDLPOC, HDLP, LDL, LDLCPOC, LDLC, DLDLP, VLDLC, VLDL, TGLX, TRIGL, TRIGP, TGLPOCT, CHHD, CHHDX      CT Results (recent):  Results from Hospital Encounter encounter on 10/18/21    CT ABD PELV W CONT    Narrative  EXAM:  CT abdomen pelvis with contrast    INDICATION: Right lower abdomen pain. COMPARISON: None. TECHNIQUE: Helical CT of the abdomen  and pelvis  following the uneventful  intravenous administration of nonionic contrast.  Coronal and sagittal reformats  are performed. CT dose reduction was achieved through use of a standardized  protocol tailored for this examination and automatic exposure control for dose  modulation. FINDINGS:  The visualized lung bases demonstrate no mass or consolidation. The heart size  is normal. There is no pericardial or pleural effusion. The liver is diffusely low in attenuation in keeping with hepatic steatosis. A  small splenic calcification is noted. The pancreas and adrenal glands are  normal. The gall bladder is present  without intra- or extra-hepatic biliary  dilatation. The kidneys are symmetric without hydronephrosis. There is a subcentimeter  low-density lesion at the lower pole of the left kidney that is too small to  characterize but likely represents a cyst. No follow-up recommended. There are no dilated bowel loops. Appendix is dilated measuring 11 mm in  diameter with appendiceal wall thickening and hyperenhancement. There is marked  periappendiceal fluid and fat stranding without abscess or drainable collection. There are several foci of extraluminal air adjacent to the appendiceal base  suggesting contained perforation.  There is a tubular appendicolith or other  enteric contents measuring 2.2 cm in length at the appendiceal base (series 3,  image 70). There are no enlarged lymph nodes. The aorta tapers without aneurysm. The urinary bladder is normal.  There is no pelvic mass. There is no aggressive bony lesion. Impression  1. Findings of acute appendicitis with contained perforation. A tubular  appendicolith or other enteric contents is noted at the appendiceal base  measuring 2.2 cm in length. No abscess or drainable collection. 2. Hepatic steatosis. MRI Results (recent):  Results from [place] encounter on 10/14/21    MRI BRAIN W WO CONT    Narrative  EXAM:  MRI BRAIN W WO CONT  INDICATION:  Intractable migraine, headache disorder, dizziness, migraine with  vertigo, \"recent onset of ice pick migraines daily\". TECHNIQUE:  Sagittal T1, axial FLAIR, T2, T1 and gradient echo T2-weighted images of the  head were obtained followed by intravenous infusion 18 mL ProHance repeat axial  and coronal T1-weighted images and axial diffusion weighted images. COMPARISON: None available. FINDINGS:  The ventricular size and configuration are normal.  Normal signal demonstrated in the cerebral hemispheres, brain stem and  cerebellum. No abnormal areas of intracranial enhancement. No abnormal diffusion. No evidence of intracranial hemorrhage, infarct, mass or abnormal extra-axial  fluid collections. Flow voids are present in the vertebral, basilar and carotid artery systems. Slight low-lying cerebellar tonsils are still within the range of normal. The  craniocervical junction is otherwise unremarkable. The structures of the cranial base including paranasal sinuses are  unremarkable. Impression  Normal MRI of the head. IR Results (recent):  No results found for this or any previous visit. VAS/US Results (recent):  No results found for this or any previous visit. PHYSICAL EXAM:  General:    Alert, cooperative, no distress, appears stated age.      Head:   Normocephalic, without obvious abnormality, atraumatic. Eyes:   Conjunctivae/corneas clear. PERRLA  Nose:  Nares normal. No drainage or sinus tenderness. Throat:    Lips, mucosa, and tongue normal.  No Thrush  Neck:  Supple, symmetrical,  no adenopathy, thyroid: non tender    no carotid bruit and no JVD. Paraspinal tenderness  Back:    Symmetric,  No CVA tenderness. Lungs:   Clear to auscultation bilaterally. No Wheezing or Rhonchi. No rales. Chest wall:  No tenderness or deformity. No Accessory muscle use. Heart:   Regular rate and rhythm,  no murmur, rub or gallop. Abdomen:   Soft, non-tender. Not distended. Bowel sounds normal. No masses  Extremities: Extremities normal, atraumatic, No cyanosis. No edema. No clubbing  Skin:     Texture, turgor normal. No rashes or lesions. Not Jaundiced  Lymph nodes: Cervical, supraclavicular normal.  Psych:  Good insight. Not depressed. Not anxious or agitated. NEUROLOGICAL EXAM:  Appearance: The patient is well developed, well nourished, provides a coherent history and is in no acute distress. Mental Status: Oriented to time, place and person. Mood and affect appropriate. Cranial Nerves:   Intact visual fields. Fundi are benign. LINDEN, EOM's full, no nystagmus, no ptosis. Facial sensation is normal. Corneal reflexes are intact. Facial movement is symmetric. Hearing is normal bilaterally. Palate is midline with normal sternocleidomastoid and trapezius muscles are normal. Tongue is midline. Motor:  5-/5 strength in upper and lower proximal and distal muscles. Normal bulk and tone. No fasciculations. Reflexes:   Deep tendon reflexes 2+/4 and symmetrical.   Sensory:    Dysesthesia to touch, pinprick and vibration. Gait:   Slightly unsteady gait. Tremor:   No tremor noted. Cerebellar:  No cerebellar signs present. Neurovascular:  Normal heart sounds and regular rhythm, peripheral pulses intact, and no carotid bruits. Assesment  1.  Intractable chronic migraine without aura and without status migrainosus  Elavil  Zanaflex  Nurtec    2. Cervicalgia  Intermittent physical therapy    3. Migraine with vertigo  Nurtec    4. Cervicogenic headache  We will consider physical therapy    ___________________________________________________  PLAN: Medication and plan discussed with patient      ICD-10-CM ICD-9-CM    1. Intractable chronic migraine without aura and without status migrainosus  G43.719 346.71    2. Cervicalgia  M54.2 723.1    3. Migraine with vertigo  G43.109 346.80      780.4    4. Cervicogenic headache  G44.86 784.0      Follow-up and Dispositions    · Return in about 4 months (around 6/21/2022).                  ___________________________________________________    Attending Physician: Courtney Sosa MD

## 2022-03-17 RX ORDER — AMITRIPTYLINE HYDROCHLORIDE 10 MG/1
10 TABLET, FILM COATED ORAL
Qty: 90 TABLET | Refills: 3 | Status: SHIPPED | OUTPATIENT
Start: 2022-03-17

## 2022-03-18 PROBLEM — K37 APPENDICITIS: Status: ACTIVE | Noted: 2021-10-19

## 2022-03-18 PROBLEM — K35.32 APPENDICITIS WITH PERFORATION: Status: ACTIVE | Noted: 2021-10-25

## 2022-03-19 PROBLEM — I10 HYPERTENSION: Status: ACTIVE | Noted: 2021-10-19

## 2022-03-19 PROBLEM — I34.1 MITRAL VALVE PROLAPSE: Status: ACTIVE | Noted: 2020-08-18

## 2022-03-19 PROBLEM — E78.00 HYPERCHOLESTEREMIA: Status: ACTIVE | Noted: 2020-08-18

## 2022-03-20 ENCOUNTER — PATIENT MESSAGE (OUTPATIENT)
Dept: NEUROLOGY | Age: 52
End: 2022-03-20

## 2022-03-23 DIAGNOSIS — F32.A ANXIETY AND DEPRESSION: ICD-10-CM

## 2022-03-23 DIAGNOSIS — F41.9 ANXIETY AND DEPRESSION: ICD-10-CM

## 2022-03-23 RX ORDER — DULOXETIN HYDROCHLORIDE 60 MG/1
CAPSULE, DELAYED RELEASE ORAL
Qty: 90 CAPSULE | Refills: 0 | Status: SHIPPED | OUTPATIENT
Start: 2022-03-23 | End: 2022-07-13

## 2022-03-24 RX ORDER — GALCANEZUMAB 120 MG/ML
INJECTION, SOLUTION SUBCUTANEOUS
Qty: 1 EACH | Refills: 5 | Status: SHIPPED | OUTPATIENT
Start: 2022-03-24 | End: 2022-09-05 | Stop reason: SDUPTHER

## 2022-05-17 ENCOUNTER — TELEPHONE (OUTPATIENT)
Dept: NEUROLOGY | Age: 52
End: 2022-05-17

## 2022-05-17 DIAGNOSIS — M54.2 CERVICALGIA: ICD-10-CM

## 2022-05-17 RX ORDER — METOPROLOL SUCCINATE 100 MG/1
TABLET, EXTENDED RELEASE ORAL
Qty: 90 TABLET | Refills: 0 | Status: SHIPPED | OUTPATIENT
Start: 2022-05-17 | End: 2022-06-17 | Stop reason: SDUPTHER

## 2022-05-17 NOTE — TELEPHONE ENCOUNTER
Patient called needing refill of her methylprednisolone (medrol dosepack) 4mg tablets.  [phone]

## 2022-05-17 NOTE — TELEPHONE ENCOUNTER
Cardiologist: Dr. Naga Callejas    Last appt: 6/1/2021  Future Appointments   Date Time Provider Namrata Singh   5/25/2022 10:40 AM MD MIGUEL ANGEL Tolentino BS AMB   6/22/2022  3:40 PM Debra Schmitt MD NEUM BS AMB   8/4/2022  1:30 PM GIULIANO Rosado BS AMB       Requested Prescriptions     Signed Prescriptions Disp Refills    metoprolol succinate (TOPROL-XL) 100 mg tablet 90 Tablet 0     Sig: TAKE 1 TABLET BY MOUTH EVERY DAY     Authorizing Provider: [employer]     Ordering User: TUNDE HOOVER         Refills VO per Dr. Naga Callejas.

## 2022-05-17 NOTE — TELEPHONE ENCOUNTER
Returned call,verified pt with two pt identifiers, pt advised she has had a migraine for 4 days now. She is having aura over rt eye, body aches and nausea. She has tried Nurtec and Fiorcet but the pain comes back and has not really gone away. She advised  told her to call if this persist and he would send in the medrol dose pack since it breaks up her migraine pain. I advised I will send to DR. Schmitt for advice. Pt verbalized understanding.

## 2022-05-18 DIAGNOSIS — M54.2 CERVICALGIA: ICD-10-CM

## 2022-05-18 RX ORDER — METHYLPREDNISOLONE 4 MG/1
TABLET ORAL
Qty: 1 DOSE PACK | Refills: 0 | Status: CANCELLED | OUTPATIENT
Start: 2022-05-18

## 2022-05-18 RX ORDER — METHYLPREDNISOLONE 4 MG/1
TABLET ORAL
Qty: 1 DOSE PACK | Refills: 0 | OUTPATIENT
Start: 2022-05-18

## 2022-05-19 DIAGNOSIS — M54.2 CERVICALGIA: ICD-10-CM

## 2022-05-19 RX ORDER — METHYLPREDNISOLONE 4 MG/1
TABLET ORAL
Qty: 1 DOSE PACK | Refills: 0 | Status: SHIPPED | OUTPATIENT
Start: 2022-05-19 | End: 2022-06-22 | Stop reason: ALTCHOICE

## 2022-06-01 RX ORDER — ROSUVASTATIN CALCIUM 5 MG/1
TABLET, COATED ORAL
Qty: 90 TABLET | Refills: 0 | Status: SHIPPED | OUTPATIENT
Start: 2022-06-01

## 2022-06-01 NOTE — TELEPHONE ENCOUNTER
Cardiologist: Dr. Sonia High    Last appt: 6/1/2021  Future Appointments   Date Time Provider Namrata Singh   6/15/2022  9:00 AM MD MIGUEL ANGEL Griffin BS AMB   6/22/2022  3:40 PM Crissy Schmitt MD NEUM BS AMB   8/4/2022  1:30 PM GIULIANO LucianoMA BS AMB       Requested Prescriptions     Signed Prescriptions Disp Refills    rosuvastatin (CRESTOR) 5 mg tablet 90 Tablet 0     Sig: TAKE 1 TABLET BY MOUTH EVERY DAY AT NIGHT     Authorizing Provider: Nickolas Silver     Ordering User: TUNDE HOOVER         Refbert VO per Dr. Sonia High.

## 2022-06-15 ENCOUNTER — OFFICE VISIT (OUTPATIENT)
Dept: CARDIOLOGY CLINIC | Age: 52
End: 2022-06-15
Payer: COMMERCIAL

## 2022-06-15 VITALS
BODY MASS INDEX: 31.83 KG/M2 | HEIGHT: 68 IN | WEIGHT: 210 LBS | OXYGEN SATURATION: 95 % | SYSTOLIC BLOOD PRESSURE: 120 MMHG | RESPIRATION RATE: 16 BRPM | HEART RATE: 61 BPM | DIASTOLIC BLOOD PRESSURE: 80 MMHG

## 2022-06-15 DIAGNOSIS — R00.2 PALPITATIONS: Primary | ICD-10-CM

## 2022-06-15 DIAGNOSIS — R06.02 SOB (SHORTNESS OF BREATH): ICD-10-CM

## 2022-06-15 PROCEDURE — 93000 ELECTROCARDIOGRAM COMPLETE: CPT | Performed by: SPECIALIST

## 2022-06-15 PROCEDURE — 99214 OFFICE O/P EST MOD 30 MIN: CPT | Performed by: SPECIALIST

## 2022-06-15 NOTE — PATIENT INSTRUCTIONS
Please have FASTING labs ASAP as ordered by your PCP    Please schedule an echocardiogram and we will call you about results    Follow up with Dr. Peter Lomax in 1 year

## 2022-06-15 NOTE — PROGRESS NOTES
[facility]                                                            OFFICE NOTE        Nyasia Case M.D.,BRODIE RUBI LINCOLN   1970  [id number]    Date/Time:  6/15/42328:24 AM            SUBJECTIVE:  No particular palpitations reported very infrequent. She had COVID infection in February 2021 to since then she has been experiencing some significant dyspnea with activities. No chest pain reported no presyncopal syncopal episodes. Assessment/Plan    1. Shortness of breath: This is occurred after COVID infection. Electrocardio today reveals a normal sinus rhythm normal intervals and no severe ST-T changes. I feel that we need to proceed with further evaluation cardiac wise on account of his shortness of breath. The patient is agreeable to proceed with echocardiogram at this time. 2.  Palpitations: Very infrequent nowadays on an increased dose of Toprol 100 mg daily. She is able to tolerate Toprol with no issues at this point. Loop monitor from April 2021 failed to reveal any complex rhythm issues. At that time BMP magnesium and TSH were normal.    She has not proceeded with a sleep study yet. 3.  Hypertension: Well-controlled on current medications. 4.  CAD: There is no history of CAD and 0 calcium score in 2019 but she does have a strong family history for coronary events with her sister having myocardial infarction at age 43 and with most of her family members having significant elevation in cholesterol. Last stress echocardiac May 2021 was negative for ischemia. No further testing for now unless of abnormal echocardiogram.    5.  Hyperlipidemia: Likely familial she is on a very small dose of Crestor and tolerating that well. She has not either liver function test or lipids level after starting cholesterol this will be obtained today.     Otherwise if echocardiograms within normal limits I will see her back in 1 year sooner if any question or problems arise prior to that. HPI   Very pleasant 47years old lady with a past medical history remarkable for hyperlipidemia, hypertension, depression anxiety, migraine headaches, mitral valve prolapse palpitation who has just relocated for [place] and she is establishing cardiology follow-up.     She was followed by cardiologist for palpitation and shortness of breath and mitral valve prolapse.  To be noted that she has undergone a calcium score in November 2019 which was 0.  She reports to me a transthoracic echocardiogram in December 2019 with normal ejection fraction mitral valve prolapse.     Past surgical history: D&C, appendectomy.     Social history: She drinks occasionally.  She does not smoke.     Family history: History of aortic valve replacement in her father. Chadwick Choudhury in her family was also noted. CARDIAC STUDIES        05/27/21    ECHO STRESS 05/28/2021 6/2/2021    Interpretation Summary  · Baseline ECG: Normal sinus rhythm, non-specific ST-T wave abnormalities. · Echo: Normal stress echocardiogram. Low risk study. Signed by: Ra Fletcher MD on 5/28/2021  4:42 PM                              EKG Results     None              IMAGING      MRI Results (most recent):  Results from [place] encounter on 10/14/21    MRI BRAIN W WO CONT    Narrative  EXAM:  MRI BRAIN W WO CONT  INDICATION:  Intractable migraine, headache disorder, dizziness, migraine with  vertigo, \"recent onset of ice pick migraines daily\". TECHNIQUE:  Sagittal T1, axial FLAIR, T2, T1 and gradient echo T2-weighted images of the  head were obtained followed by intravenous infusion 18 mL ProHance repeat axial  and coronal T1-weighted images and axial diffusion weighted images. COMPARISON: None available.   FINDINGS:  The ventricular size and configuration are normal.  Normal signal demonstrated in the cerebral hemispheres, brain stem and  cerebellum. No abnormal areas of intracranial enhancement. No abnormal diffusion. No evidence of intracranial hemorrhage, infarct, mass or abnormal extra-axial  fluid collections. Flow voids are present in the vertebral, basilar and carotid artery systems. Slight low-lying cerebellar tonsils are still within the range of normal. The  craniocervical junction is otherwise unremarkable. The structures of the cranial base including paranasal sinuses are  unremarkable. Impression  Normal MRI of the head. CT Results (most recent):  Results from Hospital Encounter encounter on 10/18/21    CT ABD PELV W CONT    Narrative  EXAM:  CT abdomen pelvis with contrast    INDICATION: Right lower abdomen pain. COMPARISON: None. TECHNIQUE: Helical CT of the abdomen  and pelvis  following the uneventful  intravenous administration of nonionic contrast.  Coronal and sagittal reformats  are performed. CT dose reduction was achieved through use of a standardized  protocol tailored for this examination and automatic exposure control for dose  modulation. FINDINGS:  The visualized lung bases demonstrate no mass or consolidation. The heart size  is normal. There is no pericardial or pleural effusion. The liver is diffusely low in attenuation in keeping with hepatic steatosis. A  small splenic calcification is noted. The pancreas and adrenal glands are  normal. The gall bladder is present  without intra- or extra-hepatic biliary  dilatation. The kidneys are symmetric without hydronephrosis. There is a subcentimeter  low-density lesion at the lower pole of the left kidney that is too small to  characterize but likely represents a cyst. No follow-up recommended. There are no dilated bowel loops. Appendix is dilated measuring 11 mm in  diameter with appendiceal wall thickening and hyperenhancement.  There is marked  periappendiceal fluid and fat stranding without abscess or drainable collection. There are several foci of extraluminal air adjacent to the appendiceal base  suggesting contained perforation. There is a tubular appendicolith or other  enteric contents measuring 2.2 cm in length at the appendiceal base (series 3,  image 70). There are no enlarged lymph nodes. The aorta tapers without aneurysm. The urinary bladder is normal.  There is no pelvic mass. There is no aggressive bony lesion. Impression  1. Findings of acute appendicitis with contained perforation. A tubular  appendicolith or other enteric contents is noted at the appendiceal base  measuring 2.2 cm in length. No abscess or drainable collection. 2. Hepatic steatosis. XR Results (most recent):  Results from Hospital Encounter encounter on 10/18/21    XR ABD PORT  1 V    Narrative  EXAM: XR ABD PORT  1 V    INDICATION: post op distention after appendectomy for perforated appendicitis    COMPARISON: None. FINDINGS: 2 supine portable radiographs of the abdomen shows air within numerous  loops of minimally distended small bowel and nondistended colon through rectum  as well as air within stomach. . Findings suggest an ileus. Mild bibasilar patchy  atelectasis of the lungs is shown. No substantial osseous abnormality is noted. Impression  Ileus pattern.           Past Medical History:   Diagnosis Date    Anxiety and depression     HTN (hypertension)     Migraines      Past Surgical History:   Procedure Laterality Date    HX APPENDECTOMY  10/18/2021    HX  SECTION      HX DILATION AND CURETTAGE      HX ORTHOPAEDIC  2010    titanium conchita, cervical spine     Social History     Tobacco Use    Smoking status: Never Smoker    Smokeless tobacco: Never Used   Vaping Use    Vaping Use: Never used   Substance Use Topics    Alcohol use: Yes     Comment: 2-3 glasses of wine/month    Drug use: Never     Family History   Problem Relation Age of Onset    Stroke Mother     Cancer Mother         brain tumor    Heart Disease Father     Prostate Cancer Father     Hypertension Sister     Heart Attack Sister     Alzheimer's Disease Maternal Grandmother     Heart Attack Maternal Grandmother     Hypertension Maternal Grandmother     Lung Cancer Paternal Grandmother     Other Paternal Grandfather         cirrhosis      Allergies   Allergen Reactions    Latex Hives, Itching and Other (comments)     Swelling at contact areas    [place] Swelling    Bactrim [Sulfamethoprim] Hives         There were no vitals taken for this visit. There were no vitals filed for this visit. Review of Systems:   Pertinent items are noted in the History of Present Illness. Visit Vitals  /80   Pulse 61   Resp 16   Ht 5' 7.5\" (1.715 m)   Wt 210 lb (95.3 kg)   SpO2 95%   BMI 32.41 kg/m²     General Appearance:  Well developed, well nourished,alert and oriented x 3, and individual in no acute distress. Ears/Nose/Mouth/Throat:   Hearing grossly normal.         Neck: Supple. Chest:   Lungs clear to auscultation bilaterally. Cardiovascular:  Regular rate and rhythm, S1, S2 normal, no murmur. Abdomen:   Soft, non-tender, bowel sounds are active. Extremities: No edema bilaterally. Skin: Warm and dry. Current Outpatient Medications on File Prior to Visit   Medication Sig Dispense Refill    rosuvastatin (CRESTOR) 5 mg tablet TAKE 1 TABLET BY MOUTH EVERY DAY AT NIGHT 90 Tablet 0    methylPREDNISolone (MEDROL DOSEPACK) 4 mg tablet Take as directed 1 Dose Pack 0    metoprolol succinate (TOPROL-XL) 100 mg tablet TAKE 1 TABLET BY MOUTH EVERY DAY 90 Tablet 0    Emgality Pen 120 mg/mL injection INJECT 1ML SUBCUTANEOUSLY EVERY MONTH 1 Each 5    DULoxetine (CYMBALTA) 60 mg capsule TAKE 1 CAPSULE BY MOUTH EVERY DAY 90 Capsule 0    amitriptyline (ELAVIL) 10 mg tablet Take 1 Tablet by mouth nightly.  90 Tablet 3    Nurtec ODT 75 mg disintegrating tablet 1 tab p.o.qod, then 1 p.o. prn for severe headache 16 Tablet 3    olmesartan-hydroCHLOROthiazide (BENICAR HCT) 40-25 mg per tablet TAKE 1 TABLET BY MOUTH EVERY DAY 90 Tablet 1    butalbital-acetaminophen-caffeine (FIORICET, ESGIC) -40 mg per tablet Take 1 Tablet by mouth every six (6) hours as needed for Migraine. Take 1 Tab by mouth as needed for Migraine. 40 Tablet 3    tiZANidine (ZANAFLEX) 4 mg tablet TAKE 1 to 2 TABLETS BY MOUTH AT BEDTIME 60 Tablet 3    ALPRAZolam (XANAX) 0.5 mg tablet TAKE 1/2 TO 1 TABLET BY MOUTH DAILY AS NEEDED FOR ANXIETY      ondansetron (ZOFRAN ODT) 4 mg disintegrating tablet Take 1 Tablet by mouth every eight (8) hours as needed for Nausea or Vomiting. 20 Tablet 0    magnesium 250 mg tab Take 500 mg by mouth. No current facility-administered medications on file prior to visit. Mychal Pile \"Cass\" had no medications administered during this visit. Current Outpatient Medications   Medication Sig    rosuvastatin (CRESTOR) 5 mg tablet TAKE 1 TABLET BY MOUTH EVERY DAY AT NIGHT    methylPREDNISolone (MEDROL DOSEPACK) 4 mg tablet Take as directed    metoprolol succinate (TOPROL-XL) 100 mg tablet TAKE 1 TABLET BY MOUTH EVERY DAY    Emgality Pen 120 mg/mL injection INJECT 1ML SUBCUTANEOUSLY EVERY MONTH    DULoxetine (CYMBALTA) 60 mg capsule TAKE 1 CAPSULE BY MOUTH EVERY DAY    amitriptyline (ELAVIL) 10 mg tablet Take 1 Tablet by mouth nightly.  Nurtec ODT 75 mg disintegrating tablet 1 tab p.o.qod, then 1 p.o. prn for severe headache    olmesartan-hydroCHLOROthiazide (BENICAR HCT) 40-25 mg per tablet TAKE 1 TABLET BY MOUTH EVERY DAY    butalbital-acetaminophen-caffeine (FIORICET, ESGIC) -40 mg per tablet Take 1 Tablet by mouth every six (6) hours as needed for Migraine. Take 1 Tab by mouth as needed for Migraine.     tiZANidine (ZANAFLEX) 4 mg tablet TAKE 1 to 2 TABLETS BY MOUTH AT BEDTIME    ALPRAZolam (XANAX) 0.5 mg tablet TAKE 1/2 TO 1 TABLET BY MOUTH DAILY AS NEEDED FOR ANXIETY    ondansetron (ZOFRAN ODT) 4 mg disintegrating tablet Take 1 Tablet by mouth every eight (8) hours as needed for Nausea or Vomiting.  magnesium 250 mg tab Take 500 mg by mouth. No current facility-administered medications for this visit. No results found for: CHOL, CHOLPOCT, CHOLX, CHLST, CHOLV, HDL, HDLPOC, HDLP, LDL, LDLCPOC, LDLC, DLDLP, VLDLC, VLDL, TGLX, TRIGL, TRIGP, TGLPOCT, CHHD, CHHDX    Lab Results   Component Value Date/Time    Sodium 137 10/27/2021 04:12 AM    Potassium 4.2 10/27/2021 04:12 AM    Chloride 105 10/27/2021 04:12 AM    CO2 27 10/27/2021 04:12 AM    Anion gap 5 10/27/2021 04:12 AM    Glucose 114 (H) 10/27/2021 04:12 AM    BUN 8 10/27/2021 04:12 AM    Creatinine 0.68 10/27/2021 04:12 AM    BUN/Creatinine ratio 12 10/27/2021 04:12 AM    GFR est AA >60 10/27/2021 04:12 AM    GFR est non-AA >60 10/27/2021 04:12 AM    Calcium 8.9 10/27/2021 04:12 AM       Lab Results   Component Value Date/Time    ALT (SGPT) 32 10/18/2021 11:16 PM    Alk. phosphatase 121 (H) 10/18/2021 11:16 PM    Bilirubin, total 0.6 10/18/2021 11:16 PM       Lab Results   Component Value Date/Time    WBC 9.2 10/25/2021 01:32 AM    HGB 10.8 (L) 10/25/2021 01:32 AM    HCT 35.1 10/25/2021 01:32 AM    PLATELET 707 50/43/8792 01:32 AM    MCV 96.7 10/25/2021 01:32 AM       Lab Results   Component Value Date/Time    TSH 1.26 03/11/2021 12:11 PM         No results found for: CHOL, CHOLPOCT, CHOLX, CHLST, CHOLV, HDL, HDLP, LDL, LDLC, DLDLP, TGLX, TRIGL, TRIGP, TGLPOCT, NTGLT, CHHD, CHHDX             Please note that this dictation was completed with Resonate, the computer voice recognition software. Quite often unanticipated grammatical, syntax, homophones, and other interpretative errors are inadvertently transcribed by the computer software. Please disregard these errors. Please excuse any errors that have escaped final proofreading.

## 2022-06-15 NOTE — PROGRESS NOTES
Visit Vitals  /80   Pulse 61   Resp 16   Ht 5' 7.5\" (1.715 m)   Wt 210 lb (95.3 kg)   SpO2 95%   BMI 32.41 kg/m²

## 2022-06-20 RX ORDER — METOPROLOL SUCCINATE 100 MG/1
100 TABLET, EXTENDED RELEASE ORAL DAILY
Qty: 90 TABLET | Refills: 1 | Status: SHIPPED | OUTPATIENT
Start: 2022-06-20 | End: 2022-08-15

## 2022-06-20 NOTE — TELEPHONE ENCOUNTER
Cardiologist: Dr. Adolfo Rapp    Last appt: 6/15/2022  Future Appointments   Date Time Provider Namrata Singh   6/22/2022  3:40 PM Louie Schmitt MD NEUM BS AMB   7/18/2022  3:00 PM SOM BURR BS AMB   6/16/2023  9:20 AM Rayo Pitts MD CAVREY BS AMB       Requested Prescriptions     Signed Prescriptions Disp Refills    metoprolol succinate (TOPROL-XL) 100 mg tablet 90 Tablet 1     Sig: Take 1 Tablet by mouth daily. Authorizing Provider: Cheryl Cordero     Ordering User: TUNDE HOOVER         Refills VO per Dr. Adolfo Rapp.

## 2022-06-21 RX ORDER — TIZANIDINE 4 MG/1
TABLET ORAL
Qty: 60 TABLET | Refills: 3 | Status: SHIPPED | OUTPATIENT
Start: 2022-06-21 | End: 2022-08-22

## 2022-06-22 ENCOUNTER — VIRTUAL VISIT (OUTPATIENT)
Dept: NEUROLOGY | Age: 52
End: 2022-06-22
Payer: COMMERCIAL

## 2022-06-22 DIAGNOSIS — E55.9 VITAMIN D DEFICIENCY: ICD-10-CM

## 2022-06-22 DIAGNOSIS — G43.109 MIGRAINE WITH VERTIGO: ICD-10-CM

## 2022-06-22 DIAGNOSIS — G43.019 INTRACTABLE MIGRAINE WITHOUT AURA AND WITHOUT STATUS MIGRAINOSUS: ICD-10-CM

## 2022-06-22 DIAGNOSIS — G43.719 INTRACTABLE CHRONIC MIGRAINE WITHOUT AURA AND WITHOUT STATUS MIGRAINOSUS: Primary | ICD-10-CM

## 2022-06-22 PROCEDURE — 99214 OFFICE O/P EST MOD 30 MIN: CPT | Performed by: PSYCHIATRY & NEUROLOGY

## 2022-06-22 RX ORDER — RIMEGEPANT SULFATE 75 MG/75MG
TABLET, ORALLY DISINTEGRATING ORAL
Qty: 16 TABLET | Refills: 11 | Status: SHIPPED | OUTPATIENT
Start: 2022-06-22

## 2022-06-22 RX ORDER — ERGOCALCIFEROL 1.25 MG/1
50000 CAPSULE ORAL
Qty: 4 CAPSULE | Refills: 4 | Status: SHIPPED | OUTPATIENT
Start: 2022-06-22

## 2022-06-22 NOTE — PROGRESS NOTES
Chief Complaint   Patient presents with    Migraine     follow up - have not worsened - sleeping through the night amitriptyline is help      1. Have you been to the ER, urgent care clinic since your last visit? Hospitalized since your last visit? No     2. Have you seen or consulted any other health care providers outside of the [address] [place] [address] since your last visit? Include any pap smears or colon screening.   No

## 2022-06-22 NOTE — PROGRESS NOTES
[facility]  [facility] Progress Note  Greer Ying was seen by synchronous (real-time) audio-video technology on 22. Consent:  She  is aware that this patient-initiated Telehealth encounter is a billable service, with coverage as determined by her insurance carrier. She is aware that she may receive a bill and has provided verbal consent to proceed: Yes    I was in the office while conducting this encounter. Pursuant to the emergency declaration under the [address] [place] [address] waiver authority and the [employer] Act, this Virtual  Visit was conducted, with patient's consent, to reduce the patient's risk of exposure to COVID-19 and provide continuity of care for an established patient. Services were provided through a video synchronous discussion virtually to substitute for in-person clinic visit. NAME:  Greer Ying   :   1970   MRN:   [id number]     Date/Time:  2022  Subjective:    Greer Ying is a 47 y.o. female here today for follow-up for headache and neck pain. Patient says she continues to do well, headache frequency has decreased to 1 to 2/week and less severity  Headache is throbbing in nature frontal, sharp pain coming from the back of the head. Patient says since after the neck surgery with titanium in the spine, sharp pain has been coming from the back of the head translating into headache. Headache associated with dizziness, blurry vision, occasional double vision, nausea, photophobia, phonophobia. She says she still is better with amitriptyline. .  According to patient, current combination has been helping a whole lot. She however still experiences a lot of neck pain.   She denies loss of consciousness, dysphagia or odynophagia  Review of Systems - General ROS: positive for  - fatigue, night sweats and sleep disturbance  Psychological ROS: positive for - anxiety, concentration difficulties, depression and sleep disturbances  Ophthalmic ROS: positive for - blurry vision, decreased vision and photophobia  ENT ROS: positive for - headaches, tinnitus, vertigo and visual changes  Allergy and Immunology ROS: negative  Hematological and Lymphatic ROS: negative  Endocrine ROS: negative  Respiratory ROS: no cough, shortness of breath, or wheezing  Cardiovascular ROS: no chest pain or dyspnea on exertion  Gastrointestinal ROS: no abdominal pain, change in bowel habits, or black or bloody stools  Genito-Urinary ROS: no dysuria, trouble voiding, or hematuria  Musculoskeletal ROS: positive for - joint pain, muscle pain and muscular weakness  Neurological ROS: positive for - dizziness, headaches, numbness/tingling, visual changes and weakness  Dermatological ROS: negative        Medications reviewed:  Current Outpatient Medications   Medication Sig Dispense Refill    Nurtec ODT 75 mg disintegrating tablet 1 tab p.o.qod, then 1 p.o. prn for severe headache 16 Tablet 11    ergocalciferol (ERGOCALCIFEROL) 1,250 mcg (50,000 unit) capsule Take 1 Capsule by mouth every seven (7) days. 4 Capsule 4    tiZANidine (ZANAFLEX) 4 mg tablet TAKE 1 to 2 TABLETS BY MOUTH AT BEDTIME 60 Tablet 3    metoprolol succinate (TOPROL-XL) 100 mg tablet Take 1 Tablet by mouth daily. 90 Tablet 1    rosuvastatin (CRESTOR) 5 mg tablet TAKE 1 TABLET BY MOUTH EVERY DAY AT NIGHT 90 Tablet 0    Emgality Pen 120 mg/mL injection INJECT 1ML SUBCUTANEOUSLY EVERY MONTH 1 Each 5    DULoxetine (CYMBALTA) 60 mg capsule TAKE 1 CAPSULE BY MOUTH EVERY DAY 90 Capsule 0    amitriptyline (ELAVIL) 10 mg tablet Take 1 Tablet by mouth nightly. 90 Tablet 3    olmesartan-hydroCHLOROthiazide (BENICAR HCT) 40-25 mg per tablet TAKE 1 TABLET BY MOUTH EVERY DAY 90 Tablet 1    butalbital-acetaminophen-caffeine (FIORICET, ESGIC) -40 mg per tablet Take 1 Tablet by mouth every six (6) hours as needed for Migraine.  Take 1 Tab by mouth as needed for Migraine. 40 Tablet 3    ALPRAZolam (XANAX) 0.5 mg tablet TAKE 1/2 TO 1 TABLET BY MOUTH DAILY AS NEEDED FOR ANXIETY      ondansetron (ZOFRAN ODT) 4 mg disintegrating tablet Take 1 Tablet by mouth every eight (8) hours as needed for Nausea or Vomiting. 20 Tablet 0    magnesium 250 mg tab Take 500 mg by mouth. Objective:   Vitals: There were no vitals filed for this visit. Lab Data Reviewed:  Lab Results   Component Value Date/Time    WBC 9.2 10/25/2021 01:32 AM    HCT 35.1 10/25/2021 01:32 AM    HGB 10.8 (L) 10/25/2021 01:32 AM    PLATELET 041 31/11/8913 01:32 AM       Lab Results   Component Value Date/Time    Sodium 137 10/27/2021 04:12 AM    Potassium 4.2 10/27/2021 04:12 AM    Chloride 105 10/27/2021 04:12 AM    CO2 27 10/27/2021 04:12 AM    Glucose 114 (H) 10/27/2021 04:12 AM    BUN 8 10/27/2021 04:12 AM    Creatinine 0.68 10/27/2021 04:12 AM    Calcium 8.9 10/27/2021 04:12 AM       No components found for: TROPQUANT    No results found for: MONICA      No results found for: HBA1C, LMW5VXGD, UCD7ZMGV     Lab Results   Component Value Date/Time    Vitamin B12 451 10/01/2021 10:30 AM       No results found for: MONICA, ANARX, ANAIGG, XBANA    No results found for: CHOL, CHOLPOCT, CHOLX, CHLST, CHOLV, HDL, HDLPOC, HDLP, LDL, LDLCPOC, LDLC, DLDLP, VLDLC, VLDL, TGLX, TRIGL, TRIGP, TGLPOCT, CHHD, CHHDX      CT Results (recent):  Results from [place] encounter on 10/18/21    CT ABD PELV W CONT    Narrative  EXAM:  CT abdomen pelvis with contrast    INDICATION: Right lower abdomen pain. COMPARISON: None. TECHNIQUE: Helical CT of the abdomen  and pelvis  following the uneventful  intravenous administration of nonionic contrast.  Coronal and sagittal reformats  are performed. CT dose reduction was achieved through use of a standardized  protocol tailored for this examination and automatic exposure control for dose  modulation.     FINDINGS:  The visualized lung bases demonstrate no mass or consolidation. The heart size  is normal. There is no pericardial or pleural effusion. The liver is diffusely low in attenuation in keeping with hepatic steatosis. A  small splenic calcification is noted. The pancreas and adrenal glands are  normal. The gall bladder is present  without intra- or extra-hepatic biliary  dilatation. The kidneys are symmetric without hydronephrosis. There is a subcentimeter  low-density lesion at the lower pole of the left kidney that is too small to  characterize but likely represents a cyst. No follow-up recommended. There are no dilated bowel loops. Appendix is dilated measuring 11 mm in  diameter with appendiceal wall thickening and hyperenhancement. There is marked  periappendiceal fluid and fat stranding without abscess or drainable collection. There are several foci of extraluminal air adjacent to the appendiceal base  suggesting contained perforation. There is a tubular appendicolith or other  enteric contents measuring 2.2 cm in length at the appendiceal base (series 3,  image 70). There are no enlarged lymph nodes. The aorta tapers without aneurysm. The urinary bladder is normal.  There is no pelvic mass. There is no aggressive bony lesion. Impression  1. Findings of acute appendicitis with contained perforation. A tubular  appendicolith or other enteric contents is noted at the appendiceal base  measuring 2.2 cm in length. No abscess or drainable collection. 2. Hepatic steatosis. MRI Results (recent):  Results from [place] encounter on 10/14/21    MRI BRAIN W WO CONT    Narrative  EXAM:  MRI BRAIN W WO CONT  INDICATION:  Intractable migraine, headache disorder, dizziness, migraine with  vertigo, \"recent onset of ice pick migraines daily\".   TECHNIQUE:  Sagittal T1, axial FLAIR, T2, T1 and gradient echo T2-weighted images of the  head were obtained followed by intravenous infusion 18 mL ProHance repeat axial  and coronal T1-weighted images and axial diffusion weighted images. COMPARISON: None available. FINDINGS:  The ventricular size and configuration are normal.  Normal signal demonstrated in the cerebral hemispheres, brain stem and  cerebellum. No abnormal areas of intracranial enhancement. No abnormal diffusion. No evidence of intracranial hemorrhage, infarct, mass or abnormal extra-axial  fluid collections. Flow voids are present in the vertebral, basilar and carotid artery systems. Slight low-lying cerebellar tonsils are still within the range of normal. The  craniocervical junction is otherwise unremarkable. The structures of the cranial base including paranasal sinuses are  unremarkable. Impression  Normal MRI of the head. IR Results (recent):  No results found for this or any previous visit. VAS/US Results (recent):  No results found for this or any previous visit. PHYSICAL EXAM:  General:    Alert, cooperative, no distress, appears stated age. Head:   Normocephalic, without obvious abnormality, atraumatic. Eyes:   Conjunctivae/corneas clear. Nose:  Nares normal.   Throat:    Lips,  and tongue normal.  No Thrush  Neck:  Symmetrical,  no adenopathy, thyroid:    no carotid bruit and no JVD. Back:    Symmetric,  No CVA tenderness. Lungs:    No Wheezing or Rhonchi. No rales. Chest wall:   No Accessory muscle use. Heart:   Deferred  Abdomen:    Not distended. Extremities: Extremities normal, atraumatic, No cyanosis. No edema. No clubbing  Skin:      No rashes or lesions. Not Jaundiced  Lymph nodes: Cervical, supraclavicular normal.  Psych:  Good insight. Not depressed. Anxious. NEUROLOGICAL EXAM:  Appearance: The patient is well developed, well nourished, provides a coherent history and is in no acute distress. Mental Status: Oriented to time, place and person. Mood and affect appropriate.    Cranial Nerves:   Intact visual fields. , EOM's full, no nystagmus, no ptosis. . Facial movement is symmetric. Hearing is normal bilaterally. Palate is midline with normal sternocleidomastoid and trapezius muscles are normal. Tongue is midline. Motor:  Normal bulk . No fasciculations. Reflexes:   Deferred. Sensory:   NDeferred   Gait:  Normal gait. Tremor:   No tremor noted. Cerebellar:  No cerebellar signs present. Assesment  1. Intractable chronic migraine without aura and without status migrainosus  Emgality    2. Migraine with vertigo  Emgality    3. Intractable migraine without aura and without status migrainosus  Nurtec  Amitriptyline  4. Vitamin D deficiency  Continue replace with vitamin D2 50,000 units q. weekly    ___________________________________________________  PLAN: Medication and plan discussed with patient      ICD-10-CM ICD-9-CM    1. Intractable chronic migraine without aura and without status migrainosus  G43.719 346.71    2. Migraine with vertigo  G43.109 346.80      780.4    3. Intractable migraine without aura and without status migrainosus  G43.019 346.11    4.  Vitamin D deficiency  E55.9 268.9                  ___________________________________________________    Attending Physician: Toula Cooks, MD

## 2022-07-13 DIAGNOSIS — F32.A ANXIETY AND DEPRESSION: ICD-10-CM

## 2022-07-13 DIAGNOSIS — F41.9 ANXIETY AND DEPRESSION: ICD-10-CM

## 2022-07-13 RX ORDER — DULOXETIN HYDROCHLORIDE 60 MG/1
CAPSULE, DELAYED RELEASE ORAL
Qty: 90 CAPSULE | Refills: 0 | Status: SHIPPED | OUTPATIENT
Start: 2022-07-13 | End: 2022-10-09

## 2022-07-18 ENCOUNTER — ANCILLARY PROCEDURE (OUTPATIENT)
Dept: CARDIOLOGY CLINIC | Age: 52
End: 2022-07-18
Payer: COMMERCIAL

## 2022-07-18 VITALS — HEIGHT: 67 IN | WEIGHT: 210 LBS | BODY MASS INDEX: 32.96 KG/M2

## 2022-07-18 DIAGNOSIS — R00.2 PALPITATIONS: ICD-10-CM

## 2022-07-18 DIAGNOSIS — R06.02 SOB (SHORTNESS OF BREATH): ICD-10-CM

## 2022-07-18 PROCEDURE — 93306 TTE W/DOPPLER COMPLETE: CPT | Performed by: INTERNAL MEDICINE

## 2022-07-19 LAB
ECHO AO ASC DIAM: 2.9 CM
ECHO AO ASCENDING AORTA INDEX: 1.41 CM/M2
ECHO AO ROOT DIAM: 3.6 CM
ECHO AO ROOT INDEX: 1.75 CM/M2
ECHO AV AREA PEAK VELOCITY: 3.2 CM2
ECHO AV AREA VTI: 3.2 CM2
ECHO AV AREA/BSA PEAK VELOCITY: 1.6 CM2/M2
ECHO AV AREA/BSA VTI: 1.6 CM2/M2
ECHO AV MEAN GRADIENT: 3 MMHG
ECHO AV MEAN VELOCITY: 0.9 M/S
ECHO AV PEAK GRADIENT: 6 MMHG
ECHO AV PEAK VELOCITY: 1.2 M/S
ECHO AV VELOCITY RATIO: 0.92
ECHO AV VTI: 24.6 CM
ECHO EST RA PRESSURE: 3 MMHG
ECHO LA DIAMETER INDEX: 1.94 CM/M2
ECHO LA DIAMETER: 4 CM
ECHO LA TO AORTIC ROOT RATIO: 1.11
ECHO LA VOL 2C: 70 ML (ref 22–52)
ECHO LA VOL 4C: 77 ML (ref 22–52)
ECHO LA VOL BP: 74 ML (ref 22–52)
ECHO LA VOL/BSA BIPLANE: 36 ML/M2 (ref 16–34)
ECHO LA VOLUME AREA LENGTH: 82 ML
ECHO LA VOLUME INDEX A2C: 34 ML/M2 (ref 16–34)
ECHO LA VOLUME INDEX A4C: 37 ML/M2 (ref 16–34)
ECHO LA VOLUME INDEX AREA LENGTH: 40 ML/M2 (ref 16–34)
ECHO LV E' LATERAL VELOCITY: 15 CM/S
ECHO LV E' SEPTAL VELOCITY: 8 CM/S
ECHO LV EDV A2C: 60 ML
ECHO LV EDV A4C: 70 ML
ECHO LV EDV BP: 65 ML (ref 56–104)
ECHO LV EDV INDEX A4C: 34 ML/M2
ECHO LV EDV INDEX BP: 32 ML/M2
ECHO LV EDV NDEX A2C: 29 ML/M2
ECHO LV EJECTION FRACTION A2C: 57 %
ECHO LV EJECTION FRACTION A4C: 61 %
ECHO LV EJECTION FRACTION BIPLANE: 59 % (ref 55–100)
ECHO LV ESV A2C: 26 ML
ECHO LV ESV A4C: 27 ML
ECHO LV ESV BP: 27 ML (ref 19–49)
ECHO LV ESV INDEX A2C: 13 ML/M2
ECHO LV ESV INDEX A4C: 13 ML/M2
ECHO LV ESV INDEX BP: 13 ML/M2
ECHO LV FRACTIONAL SHORTENING: 45 % (ref 28–44)
ECHO LV INTERNAL DIMENSION DIASTOLE INDEX: 2.14 CM/M2
ECHO LV INTERNAL DIMENSION DIASTOLIC: 4.4 CM (ref 3.9–5.3)
ECHO LV INTERNAL DIMENSION SYSTOLIC INDEX: 1.17 CM/M2
ECHO LV INTERNAL DIMENSION SYSTOLIC: 2.4 CM
ECHO LV IVSD: 1.3 CM (ref 0.6–0.9)
ECHO LV MASS 2D: 215.1 G (ref 67–162)
ECHO LV MASS INDEX 2D: 104.4 G/M2 (ref 43–95)
ECHO LV POSTERIOR WALL DIASTOLIC: 1.3 CM (ref 0.6–0.9)
ECHO LV RELATIVE WALL THICKNESS RATIO: 0.59
ECHO LVOT AREA: 3.5 CM2
ECHO LVOT AV VTI INDEX: 0.91
ECHO LVOT DIAM: 2.1 CM
ECHO LVOT MEAN GRADIENT: 2 MMHG
ECHO LVOT PEAK GRADIENT: 5 MMHG
ECHO LVOT PEAK VELOCITY: 1.1 M/S
ECHO LVOT STROKE VOLUME INDEX: 37.5 ML/M2
ECHO LVOT SV: 77.2 ML
ECHO LVOT VTI: 22.3 CM
ECHO MV A VELOCITY: 0.78 M/S
ECHO MV E DECELERATION TIME (DT): 321.2 MS
ECHO MV E VELOCITY: 0.54 M/S
ECHO MV E/A RATIO: 0.69
ECHO MV E/E' LATERAL: 3.6
ECHO MV E/E' RATIO (AVERAGED): 5.18
ECHO MV E/E' SEPTAL: 6.75
ECHO PV MAX VELOCITY: 1.1 M/S
ECHO PV PEAK GRADIENT: 4 MMHG
ECHO RA MINOR AXIS INDEX: 1.99 CM/M2
ECHO RA MINOR AXIS: 4.1 CM
ECHO RIGHT VENTRICULAR SYSTOLIC PRESSURE (RVSP): 27 MMHG
ECHO RV INTERNAL DIMENSION: 4.2 CM
ECHO RV TAPSE: 2 CM (ref 1.7–?)
ECHO TV REGURGITANT MAX VELOCITY: 2.45 M/S
ECHO TV REGURGITANT PEAK GRADIENT: 24 MMHG

## 2022-07-21 ENCOUNTER — TELEPHONE (OUTPATIENT)
Dept: CARDIOLOGY CLINIC | Age: 52
End: 2022-07-21

## 2022-07-22 NOTE — TELEPHONE ENCOUNTER
Attempted to reach patient by telephone. A message was left for return call. [employer] message sent.

## 2022-08-15 RX ORDER — METOPROLOL SUCCINATE 100 MG/1
TABLET, EXTENDED RELEASE ORAL
Qty: 90 TABLET | Refills: 1 | Status: SHIPPED | OUTPATIENT
Start: 2022-08-15

## 2022-08-15 RX ORDER — OLMESARTAN MEDOXOMIL AND HYDROCHLOROTHIAZIDE 40/25 40; 25 MG/1; MG/1
TABLET ORAL
Qty: 90 TABLET | Refills: 1 | Status: SHIPPED | OUTPATIENT
Start: 2022-08-15

## 2022-08-15 NOTE — TELEPHONE ENCOUNTER
Cardiologist: Dr. Paola Mandujano    Last appt: 6/15/2022  Future Appointments   Date Time Provider Namrata Singh   6/16/2023  9:20 AM MD MIGUEL ANGEL [facility]       Requested Prescriptions     Signed Prescriptions Disp Refills    olmesartan-hydroCHLOROthiazide (BENICAR HCT) 40-25 mg per tablet 90 Tablet 1     Sig: TAKE 1 TABLET BY MOUTH EVERY DAY     Authorizing Provider: Eleonora Ramirez     Ordering User: TUNDE HOOVER    metoprolol succinate (TOPROL-XL) 100 mg tablet 90 Tablet 1     Sig: TAKE 1 TABLET BY MOUTH EVERY DAY     Authorizing Provider: Eleonora Ramirez     Ordering User: TUNDE HOOVER         Refills VO per Dr. Paola Mandujano.

## 2022-08-22 RX ORDER — TIZANIDINE 4 MG/1
TABLET ORAL
Qty: 180 TABLET | Refills: 1 | Status: SHIPPED | OUTPATIENT
Start: 2022-08-22

## 2022-09-06 RX ORDER — GALCANEZUMAB 120 MG/ML
INJECTION, SOLUTION SUBCUTANEOUS
Qty: 1 EACH | Refills: 5 | Status: SHIPPED | OUTPATIENT
Start: 2022-09-06

## 2022-09-06 RX ORDER — BUTALBITAL, ACETAMINOPHEN AND CAFFEINE 50; 325; 40 MG/1; MG/1; MG/1
1 TABLET ORAL
Qty: 40 TABLET | Refills: 3 | Status: SHIPPED | OUTPATIENT
Start: 2022-09-06

## 2022-11-29 ENCOUNTER — OFFICE VISIT (OUTPATIENT)
Dept: FAMILY MEDICINE CLINIC | Age: 52
End: 2022-11-29
Payer: COMMERCIAL

## 2022-11-29 VITALS
OXYGEN SATURATION: 98 % | BODY MASS INDEX: 32.13 KG/M2 | HEART RATE: 64 BPM | TEMPERATURE: 98.3 F | DIASTOLIC BLOOD PRESSURE: 82 MMHG | HEIGHT: 68 IN | RESPIRATION RATE: 17 BRPM | WEIGHT: 212 LBS | SYSTOLIC BLOOD PRESSURE: 124 MMHG

## 2022-11-29 DIAGNOSIS — I10 PRIMARY HYPERTENSION: ICD-10-CM

## 2022-11-29 DIAGNOSIS — M54.2 CHRONIC NECK PAIN WITH HISTORY OF CERVICAL SPINAL SURGERY: ICD-10-CM

## 2022-11-29 DIAGNOSIS — E55.9 VITAMIN D DEFICIENCY: ICD-10-CM

## 2022-11-29 DIAGNOSIS — Z98.890 CHRONIC NECK PAIN WITH HISTORY OF CERVICAL SPINAL SURGERY: ICD-10-CM

## 2022-11-29 DIAGNOSIS — Z12.31 BREAST CANCER SCREENING BY MAMMOGRAM: ICD-10-CM

## 2022-11-29 DIAGNOSIS — Z00.00 ENCOUNTER FOR MEDICAL EXAMINATION TO ESTABLISH CARE: Primary | ICD-10-CM

## 2022-11-29 DIAGNOSIS — Z23 NEEDS FLU SHOT: ICD-10-CM

## 2022-11-29 DIAGNOSIS — G89.28 CHRONIC NECK PAIN WITH HISTORY OF CERVICAL SPINAL SURGERY: ICD-10-CM

## 2022-11-29 DIAGNOSIS — F32.A ANXIETY AND DEPRESSION: ICD-10-CM

## 2022-11-29 DIAGNOSIS — Z11.59 NEED FOR HEPATITIS C SCREENING TEST: ICD-10-CM

## 2022-11-29 DIAGNOSIS — E78.00 HYPERCHOLESTEREMIA: ICD-10-CM

## 2022-11-29 DIAGNOSIS — F41.9 ANXIETY AND DEPRESSION: ICD-10-CM

## 2022-11-29 DIAGNOSIS — G43.709 CHRONIC MIGRAINE WITHOUT AURA WITHOUT STATUS MIGRAINOSUS, NOT INTRACTABLE: ICD-10-CM

## 2022-11-29 LAB
BILIRUB UR QL STRIP: NEGATIVE
GLUCOSE UR-MCNC: NEGATIVE MG/DL
KETONES P FAST UR STRIP-MCNC: NEGATIVE MG/DL
PH UR STRIP: 7.5 [PH] (ref 4.6–8)
PROT UR QL STRIP: NEGATIVE
SP GR UR STRIP: 1.02 (ref 1–1.03)
UA UROBILINOGEN AMB POC: NORMAL (ref 0.2–1)
URINALYSIS CLARITY POC: CLEAR
URINALYSIS COLOR POC: YELLOW
URINE BLOOD POC: NEGATIVE
URINE LEUKOCYTES POC: NEGATIVE
URINE NITRITES POC: NEGATIVE

## 2022-11-29 PROCEDURE — 90686 IIV4 VACC NO PRSV 0.5 ML IM: CPT

## 2022-11-29 PROCEDURE — 3074F SYST BP LT 130 MM HG: CPT

## 2022-11-29 PROCEDURE — 90471 IMMUNIZATION ADMIN: CPT

## 2022-11-29 PROCEDURE — 81003 URINALYSIS AUTO W/O SCOPE: CPT

## 2022-11-29 PROCEDURE — 3078F DIAST BP <80 MM HG: CPT

## 2022-11-29 PROCEDURE — 99203 OFFICE O/P NEW LOW 30 MIN: CPT

## 2022-11-29 NOTE — PATIENT INSTRUCTIONS
Vaccine Information Statement    Influenza (Flu) Vaccine (Inactivated or Recombinant): What You Need to Know    Many vaccine information statements are available in [place] and other languages. See www.immunize.org/vis. Hojas de información sobre vacunas están disponibles en español y en muchos otros idiomas. Visite www.immunize.org/vis. 1. Why get vaccinated? Influenza vaccine can prevent influenza (flu). Flu is a contagious disease that spreads around the [facility] every year, usually between October and May. Anyone can get the flu, but it is more dangerous for some people. Infants and young children, people 72 years and older, pregnant people, and people with certain health conditions or a weakened immune system are at greatest risk of flu complications. Pneumonia, bronchitis, sinus infections, and ear infections are examples of flu-related complications. If you have a medical condition, such as heart disease, cancer, or diabetes, flu can make it worse. Flu can cause fever and chills, sore throat, muscle aches, fatigue, cough, headache, and runny or stuffy nose. Some people may have vomiting and diarrhea, though this is more common in children than adults. In an average year, thousands of people in the [facility] die from flu, and many more are hospitalized. Flu vaccine prevents millions of illnesses and flu-related visits to the doctor each year. 2. Influenza vaccines     CDC recommends everyone 6 months and older get vaccinated every flu season. Children 6 months through 6years of age may need 2 doses during a single flu season. Everyone else needs only 1 dose each flu season. It takes about 2 weeks for protection to develop after vaccination. There are many flu viruses, and they are always changing. Each year a new flu vaccine is made to protect against the influenza viruses believed to be likely to cause disease in the upcoming flu season.  Even when the vaccine doesnt exactly match these viruses, it may still provide some protection. Influenza vaccine does not cause flu. Influenza vaccine may be given at the same time as other vaccines. 3. Talk with your health care provider    Tell your vaccination provider if the person getting the vaccine:  Has had an allergic reaction after a previous dose of influenza vaccine, or has any severe, life-threatening allergies   Has ever had Guillain-Barré Syndrome (also called GBS)    In some cases, your health care provider may decide to postpone influenza vaccination until a future visit. Influenza vaccine can be administered at any time during pregnancy. People who are or will be pregnant during influenza season should receive inactivated influenza vaccine. People with minor illnesses, such as a cold, may be vaccinated. People who are moderately or severely ill should usually wait until they recover before getting influenza vaccine. Your health care provider can give you more information. 4. Risks of a vaccine reaction    Soreness, redness, and swelling where the shot is given, fever, muscle aches, and headache can happen after influenza vaccination. There may be a very small increased risk of Guillain-Barré Syndrome (GBS) after inactivated influenza vaccine (the flu shot). [place] children who get the flu shot along with pneumococcal vaccine (PCV13) and/or DTaP vaccine at the same time might be slightly more likely to have a seizure caused by fever. Tell your health care provider if a child who is getting flu vaccine has ever had a seizure. People sometimes faint after medical procedures, including vaccination. Tell your provider if you feel dizzy or have vision changes or ringing in the ears. As with any medicine, there is a very remote chance of a vaccine causing a severe allergic reaction, other serious injury, or death. 5. What if there is a serious problem?     An allergic reaction could occur after the vaccinated person leaves the clinic. If you see signs of a severe allergic reaction (hives, swelling of the face and throat, difficulty breathing, a fast heartbeat, dizziness, or weakness), call 9-1-1 and get the person to the nearest hospital.    For other signs that concern you, call your health care provider. Adverse reactions should be reported to the Vaccine Adverse Event Reporting System (VAERS). Your health care provider will usually file this report, or you can do it yourself. Visit the VAERS website at www.vaers. [facility].gov or call 3-900.931.6762. VAERS is only for reporting reactions, and VAERS staff members do not give medical advice. 6. The National Vaccine Injury Compensation Program    The [facility] Vaccine Injury Compensation Program (VICP) is a federal program that was created to compensate people who may have been injured by certain vaccines. Claims regarding alleged injury or death due to vaccination have a time limit for filing, which may be as short as two years. Visit the VICP website at www.[facility].gov/vaccinecompensation or call 4-493.278.8820 to learn about the program and about filing a claim. 7. How can I learn more? Ask your health care provider. Call your local or state health department. Visit the website of the Food and Drug Administration (FDA) for vaccine package inserts and additional information at www.fda.gov/vaccines-blood-biologics/vaccines. Contact the Centers for Disease Control and Prevention (CDC): Call 8-439.657.9654 (1-720-JMQ-INFO) or  Visit CDCs influenza website at www.cdc.gov/flu. Vaccine Information Statement   Inactivated Influenza Vaccine   8/6/2021  42 FRANCO Bland [id number]-[id number]     Department of Health and Human Services  Centers for Disease Control and Prevention    Office Use Only         Heart-Healthy Diet: Care Instructions  Your Care Instructions     A heart-healthy diet has lots of vegetables, fruits, nuts, beans, and whole grains, and is low in salt. It limits foods that are high in saturated fat, such as meats, cheeses, and fried foods. It may be hard to change your diet, but even small changes can lower your risk of heart attack and heart disease. Follow-up care is a key part of your treatment and safety. Be sure to make and go to all appointments, and call your doctor if you are having problems. It's also a good idea to know your test results and keep a list of the medicines you take. How can you care for yourself at home? Watch your portions  Use food labels to learn what the recommended servings are for the foods you eat. Eat only the number of calories you need to stay at a healthy weight. If you need to lose weight, eat fewer calories than your body burns (through exercise and other physical activity). Eat more fruits and vegetables  Eat a variety of fruit and vegetables every day. Dark green, deep orange, red, or yellow fruits and vegetables are especially good for you. Examples include spinach, carrots, peaches, and berries. Keep carrots, celery, and other veggies handy for snacks. Buy fruit that is in season and store it where you can see it so that you will be tempted to eat it. Cook dishes that have a lot of veggies in them, such as stir-fries and soups. Limit saturated fat  Read food labels, and try to avoid saturated fats. They increase your risk of heart disease. Use olive or canola oil when you cook. Bake, broil, grill, or steam foods instead of frying them. Choose lean meats instead of high-fat meats such as hot dogs and sausages. Cut off all visible fat when you prepare meat. Eat fish, skinless poultry, and meat alternatives such as soy products instead of high-fat meats. Soy products, such as tofu, may be especially good for your heart. Choose low-fat or fat-free milk and dairy products. Eat foods high in fiber  Eat a variety of grain products every day. Include whole-grain foods that have lots of fiber and nutrients.  Examples of whole-grain foods include oats, whole wheat bread, and brown rice. Buy whole-grain breads and cereals, instead of white bread or pastries. Limit salt and sodium  Limit how much salt and sodium you eat to help lower your blood pressure. Taste food before you salt it. Add only a little salt when you think you need it. With time, your taste buds will adjust to less salt. Eat fewer snack items, fast foods, and other high-salt, processed foods. Check food labels for the amount of sodium in packaged foods. Choose low-sodium versions of canned goods (such as soups, vegetables, and beans). Limit sugar  Limit drinks and foods with added sugar. These include candy, desserts, and soda pop. Limit alcohol  Limit alcohol to no more than 2 drinks a day for men and 1 drink a day for women. Too much alcohol can cause health problems. When should you call for help? Watch closely for changes in your health, and be sure to contact your doctor if:    You would like help planning heart-healthy meals. Where can you learn more? Go to http://www.bingham.com/  Enter V137 in the search box to learn more about \"Heart-Healthy Diet: Care Instructions. \"  Current as of: October 6, 2021               Content Version: 13.4  © 2006-2022 Healthwise[employer]. Care instructions adapted under license by [employer] (which disclaims liability or warranty for this information). If you have questions about a medical condition or this instruction, always ask your healthcare professional. [place] [address] any warranty or liability for your use of this information.

## 2022-11-29 NOTE — PROGRESS NOTES
Nilda Perez Staff,     It was a pleasure to meet you today.      I would like to inform you that your urinalysis was normal.    Best,   Julio Gunter FNP-BC

## 2022-11-29 NOTE — PROGRESS NOTES
Subjective: (As above and below)     Chief Complaint   Patient presents with    Establish Care       Sana Love is a 47 y.o. female  and presents to the office to establish care. Previous PCP was Emilie Cerna     Preventative:   LMP: Intermittent at this time. Pap smear: 2019  Mammogram: Due   Colonoscopy: UTD   Immunization: UTD     Cardiovascular Review:  The patient has essential hypertension and hyperlipidemia  Current medication: Crestor, olmesartan-HCTZ   Compliance? Yes  Home BP Monitoring: Yes  Pertinent ROS: no TIA's, no chest pain on exertion, no dyspnea on exertion, no swelling of ankles. Anxiety/Depression:   Current medication: Cymbalta and xanax   Compliance: Yes   Side effects: None   Counselor/psychiatrist: Was previously seeing a online therapist.   Coping mechanisms: Yes   Support system: Yes  Denies SI/HI      Migraines/Chronic pain:   Followed by Dr. Kailyn Chinchilla   Last visit was 6/2022  Patient has chronic cervical neck pain. History of falling down stairs and had a conchita placed in cervical neck. Pain can cause anxiety at times. Saw Dr. Rachel Tyler in June 2022 for palpitations and SOB post COVID-19 infection. Per chart, EKG showed normal sinus rhythm normal intervals and no severe ST-T changes. Had a calcium score in 2019 done that was 0 and ECHO showed normal ejection fraction with mild regurgitation (backflow of blood) at mitral valve. No recommendations for change in medications or regimen. Has follow-up appointment with cardiologist in June 2023. Nutrition Hx:  Diet: Skips breakfast, Does hello fresh, cooking more at home. 24-32 oz per day   Exercise: Started walking this week     Reviewed and agree with Nurse Note duplicated in this note. Reviewed PmHx, RxHx, FmHx, SocHx, AllgHx and updated in chart.   Current Outpatient Medications   Medication Sig    DULoxetine (CYMBALTA) 60 mg capsule TAKE 1 CAPSULE BY MOUTH EVERY DAY    butalbital-acetaminophen-caffeine (FIORICET, ESGIC) -40 mg per tablet Take 1 Tablet by mouth every six (6) hours as needed for Migraine. Take 1 Tab by mouth as needed for Migraine. Emgality Pen 120 mg/mL injection INJECT 1ML SUBCUTANEOUSLY EVERY MONTH    tiZANidine (ZANAFLEX) 4 mg tablet TAKE 1 TO 2 TABLETS BY MOUTH AT BEDTIME    olmesartan-hydroCHLOROthiazide (BENICAR HCT) 40-25 mg per tablet TAKE 1 TABLET BY MOUTH EVERY DAY    metoprolol succinate (TOPROL-XL) 100 mg tablet TAKE 1 TABLET BY MOUTH EVERY DAY    Nurtec ODT 75 mg disintegrating tablet 1 tab p.o.qod, then 1 p.o. prn for severe headache    ergocalciferol (ERGOCALCIFEROL) 1,250 mcg (50,000 unit) capsule Take 1 Capsule by mouth every seven (7) days. rosuvastatin (CRESTOR) 5 mg tablet TAKE 1 TABLET BY MOUTH EVERY DAY AT NIGHT    amitriptyline (ELAVIL) 10 mg tablet Take 1 Tablet by mouth nightly. ALPRAZolam (XANAX) 0.5 mg tablet TAKE 1/2 TO 1 TABLET BY MOUTH DAILY AS NEEDED FOR ANXIETY    ondansetron (ZOFRAN ODT) 4 mg disintegrating tablet Take 1 Tablet by mouth every eight (8) hours as needed for Nausea or Vomiting.    magnesium 250 mg tab Take 500 mg by mouth. No current facility-administered medications for this visit.       Allergies   Allergen Reactions    Latex Hives, Itching and Other (comments)     Swelling at contact areas    [place] Swelling    Bactrim [Sulfamethoprim] Hives      Past Medical History:   Diagnosis Date    Anxiety and depression     HTN (hypertension)     Migraines       Past Surgical History:   Procedure Laterality Date    HX APPENDECTOMY  10/18/2021    HX  SECTION      HX DILATION AND CURETTAGE      HX ORTHOPAEDIC  2010    titanium conchita, cervical spine      Family History   Problem Relation Age of Onset    Stroke Mother     Cancer Mother         brain tumor    Heart Disease Father     Prostate Cancer Father     Hypertension Sister     Heart Attack Sister     Alzheimer's Disease Maternal Grandmother     Heart Attack Maternal Grandmother     Hypertension Maternal Grandmother     Lung Cancer Paternal Grandmother     Other Paternal Grandfather         cirrhosis       Social History     Socioeconomic History    Marital status:      Spouse name: Not on file    Number of children: Not on file    Years of education: Not on file    Highest education level: Not on file   Occupational History    Not on file   Tobacco Use    Smoking status: Never    Smokeless tobacco: Never   Vaping Use    Vaping Use: Never used   Substance and Sexual Activity    Alcohol use: Yes     Comment: 2-3 glasses of wine/month    Drug use: Never    Sexual activity: Not on file   Other Topics Concern    Not on file   Social History Narrative    Not on file     Social Determinants of Health     Financial Resource Strain: Not on file   Food Insecurity: Not on file   Transportation Needs: Not on file   Physical Activity: Not on file   Stress: Not on file   Social Connections: Not on file   Intimate Partner Violence: Not on file   Housing Stability: Not on file             Review of Systems   Constitutional:  Negative for chills, diaphoresis, fever, malaise/fatigue and weight loss. HENT: Negative. Eyes: Negative. Respiratory:  Negative for cough and shortness of breath. Cardiovascular:  Negative for chest pain, palpitations and leg swelling. Gastrointestinal:  Negative for abdominal pain, blood in stool, constipation, diarrhea, heartburn, melena, nausea and vomiting. Genitourinary:  Negative for dysuria, flank pain, frequency, hematuria and urgency. Musculoskeletal: Negative. Chronic neck pain    Skin: Negative. Neurological:  Negative for dizziness, tingling, tremors, sensory change, speech change, focal weakness, seizures, loss of consciousness, weakness and headaches. Endo/Heme/Allergies: Negative. Psychiatric/Behavioral:  Negative for depression and suicidal ideas. The patient is not nervous/anxious.      Objective:     Physical Examination:    Visit Vitals  BP 124/82 (BP 1 Location: Left upper arm, BP Patient Position: Sitting, BP Cuff Size: Adult)   Pulse 64   Temp 98.3 °F (36.8 °C) (Temporal)   Resp 17   Ht 5' 7.5\" (1.715 m)   Wt 212 lb (96.2 kg)   SpO2 98%   BMI 32.71 kg/m²       Physical Exam  Vitals and nursing note reviewed. Constitutional:       General: She is not in acute distress. Appearance: Normal appearance. HENT:      Head: Normocephalic. Right Ear: Tympanic membrane, ear canal and external ear normal.      Left Ear: Tympanic membrane, ear canal and external ear normal.      Nose: Nose normal.      Mouth/Throat:      Mouth: Mucous membranes are moist.      Pharynx: Oropharynx is clear. Eyes:      Extraocular Movements: Extraocular movements intact. Conjunctiva/sclera: Conjunctivae normal.      Pupils: Pupils are equal, round, and reactive to light. Neck:      Thyroid: No thyromegaly or thyroid tenderness. Vascular: No carotid bruit. Cardiovascular:      Rate and Rhythm: Normal rate and regular rhythm. Pulses: Normal pulses. Heart sounds: Normal heart sounds. Pulmonary:      Effort: Pulmonary effort is normal. No respiratory distress. Breath sounds: Normal breath sounds. Abdominal:      General: Bowel sounds are normal. There is no distension. Palpations: Abdomen is soft. Tenderness: There is no abdominal tenderness. Musculoskeletal:      Cervical back: No tenderness. Right lower leg: No edema. Left lower leg: No edema. Lymphadenopathy:      Cervical: No cervical adenopathy. Skin:     General: Skin is warm and dry. Neurological:      General: No focal deficit present. Mental Status: She is alert and oriented to person, place, and time. Mental status is at baseline. Cranial Nerves: Cranial nerves 2-12 are intact. Sensory: Sensation is intact. Motor: Motor function is intact. Coordination: Coordination is intact. Gait: Gait is intact.    Psychiatric: Mood and Affect: Mood normal.         Behavior: Behavior normal.         Thought Content: Thought content normal.         Judgment: Judgment normal.           3 most recent PHQ Screens 11/29/2022   Little interest or pleasure in doing things Not at all   Feeling down, depressed, irritable, or hopeless Not at all   Total Score PHQ 2 0   Trouble falling or staying asleep, or sleeping too much -   Feeling tired or having little energy -   Poor appetite, weight loss, or overeating -   Feeling bad about yourself - or that you are a failure or have let yourself or your family down -   Trouble concentrating on things such as school, work, reading, or watching TV -   Moving or speaking so slowly that other people could have noticed; or the opposite being so fidgety that others notice -   Thoughts of being better off dead, or hurting yourself in some way -   PHQ 9 Score -      Assessment/ Plan:   Differential diagnosis and treatment options reviewed with patient who is in agreement with treatment plan as outlined below. 1. Encounter for medical examination to establish care  Needs pap smear. Patient was advised to make appointment. 2. Primary hypertension  Vitals are stable today. Can be managed by me or Dr. Joe Braswell. Continue to monitor BP at home. Discussed diet, exercise and lifestyle modifications. Follow-up if having hypertensive symptoms.   - CBC WITH AUTOMATED DIFF; Future  - METABOLIC PANEL, COMPREHENSIVE; Future  - AMB POC URINALYSIS DIP STICK AUTO W/O MICRO    3. Anxiety and depression  Controlled with cymbalta and xanax as needed. Patient is taking xanax due to chronic pain that causes anxiety. Advised patient to continue to follow-up with neurology and psychiatry for further refills. If she needs a refill before she can get in with psychiatry then will need her to come back for urine drug screening and controlled substance agreement which she agrees to doing.   - TSH 3RD GENERATION; Future    4. Hypercholesteremia  Fasting labs today. - CBC WITH AUTOMATED DIFF; Future  - LIPID PANEL; Future  - METABOLIC PANEL, COMPREHENSIVE; Future  - HEMOGLOBIN A1C WITH EAG; Future    5. Chronic migraine without aura without status migrainosus, not intractable  Continue to follow-up with Dr. Catina Granados. She will call office to see if he has left the practice. Highly encouraged patient to make an appointment with another neurologist if he is no longer with the practice. Will provide referral if needed. Encourage water intake as dehydration can contribute to migraines. 6. Need for hepatitis C screening test  - HEPATITIS C AB; Future    7. Other chronic pain  Could benefit from better management as the pain causes anxiety.   - REFERRAL TO PAIN MANAGEMENT    8. Vitamin D deficiency  - VITAMIN D, 25 HYDROXY; Future    9. Breast cancer screening by mammogram  - [facility] MAMMO BI SCREENING INCL CAD; Future    10. Needs flu shot  - INFLUENZA, FLUARIX, FLULAVAL, FLUZONE (AGE 6 MO+), AFLURIA(AGE 3Y+) IM, PF, 0.5 ML       Follow-up and Dispositions    Return in about 3 months (around 2/28/2023), or if symptoms worsen or fail to improve, for for Pap smear . Medication Side Effects and Warnings were discussed with patient: yes  Patient Labs were reviewed: yes  Patient Past Records were reviewed:  yes    Verbal and written instructions (see AVS) provided. Patient expresses understanding and agreement of diagnosis and treatment plan.     Claudene Drivers, NP

## 2022-11-29 NOTE — PROGRESS NOTES
Chief Complaint   Patient presents with    [address] Maintenance reviewed       1. Have you been to the ER, urgent care clinic since your last visit? Hospitalized since your last visit? No     2. Have you seen or consulted any other health care providers outside of the [address] [place] [address] since your last visit? Include any pap smears or colon screening.   No

## 2022-11-30 ENCOUNTER — TELEPHONE (OUTPATIENT)
Dept: NEUROLOGY | Age: 52
End: 2022-11-30

## 2022-11-30 LAB
25(OH)D3 SERPL-MCNC: 67.5 NG/ML (ref 30–100)
ALBUMIN SERPL-MCNC: 4.3 G/DL (ref 3.5–5)
ALBUMIN/GLOB SERPL: 1.1 {RATIO} (ref 1.1–2.2)
ALP SERPL-CCNC: 123 U/L (ref 45–117)
ALT SERPL-CCNC: 35 U/L (ref 12–78)
ANION GAP SERPL CALC-SCNC: 6 MMOL/L (ref 5–15)
AST SERPL-CCNC: 22 U/L (ref 15–37)
BASOPHILS # BLD: 0.1 K/UL (ref 0–0.1)
BASOPHILS NFR BLD: 1 % (ref 0–1)
BILIRUB SERPL-MCNC: 0.3 MG/DL (ref 0.2–1)
BUN SERPL-MCNC: 18 MG/DL (ref 6–20)
BUN/CREAT SERPL: 23 (ref 12–20)
CALCIUM SERPL-MCNC: 9.3 MG/DL (ref 8.5–10.1)
CHLORIDE SERPL-SCNC: 102 MMOL/L (ref 97–108)
CHOLEST SERPL-MCNC: 352 MG/DL
CO2 SERPL-SCNC: 30 MMOL/L (ref 21–32)
CREAT SERPL-MCNC: 0.79 MG/DL (ref 0.55–1.02)
DIFFERENTIAL METHOD BLD: NORMAL
EOSINOPHIL # BLD: 0.2 K/UL (ref 0–0.4)
EOSINOPHIL NFR BLD: 2 % (ref 0–7)
ERYTHROCYTE [DISTWIDTH] IN BLOOD BY AUTOMATED COUNT: 13.2 % (ref 11.5–14.5)
EST. AVERAGE GLUCOSE BLD GHB EST-MCNC: 114 MG/DL
GLOBULIN SER CALC-MCNC: 3.8 G/DL (ref 2–4)
GLUCOSE SERPL-MCNC: 97 MG/DL (ref 65–100)
HBA1C MFR BLD: 5.6 % (ref 4–5.6)
HCT VFR BLD AUTO: 42.3 % (ref 35–47)
HCV AB SERPL QL IA: NONREACTIVE
HDLC SERPL-MCNC: 62 MG/DL
HDLC SERPL: 5.7 {RATIO} (ref 0–5)
HGB BLD-MCNC: 13.5 G/DL (ref 11.5–16)
IMM GRANULOCYTES # BLD AUTO: 0 K/UL (ref 0–0.04)
IMM GRANULOCYTES NFR BLD AUTO: 0 % (ref 0–0.5)
LDLC SERPL CALC-MCNC: 236.8 MG/DL (ref 0–100)
LYMPHOCYTES # BLD: 3.5 K/UL (ref 0.8–3.5)
LYMPHOCYTES NFR BLD: 44 % (ref 12–49)
MCH RBC QN AUTO: 31 PG (ref 26–34)
MCHC RBC AUTO-ENTMCNC: 31.9 G/DL (ref 30–36.5)
MCV RBC AUTO: 97 FL (ref 80–99)
MONOCYTES # BLD: 0.7 K/UL (ref 0–1)
MONOCYTES NFR BLD: 9 % (ref 5–13)
NEUTS SEG # BLD: 3.5 K/UL (ref 1.8–8)
NEUTS SEG NFR BLD: 44 % (ref 32–75)
NRBC # BLD: 0 K/UL (ref 0–0.01)
NRBC BLD-RTO: 0 PER 100 WBC
PLATELET # BLD AUTO: 301 K/UL (ref 150–400)
PMV BLD AUTO: 10.1 FL (ref 8.9–12.9)
POTASSIUM SERPL-SCNC: 3.8 MMOL/L (ref 3.5–5.1)
PROT SERPL-MCNC: 8.1 G/DL (ref 6.4–8.2)
RBC # BLD AUTO: 4.36 M/UL (ref 3.8–5.2)
SODIUM SERPL-SCNC: 138 MMOL/L (ref 136–145)
TRIGL SERPL-MCNC: 266 MG/DL (ref ?–150)
TSH SERPL DL<=0.05 MIU/L-ACNC: 1.62 UIU/ML (ref 0.36–3.74)
VLDLC SERPL CALC-MCNC: 53.2 MG/DL
WBC # BLD AUTO: 8 K/UL (ref 3.6–11)

## 2022-11-30 RX ORDER — TIZANIDINE 4 MG/1
TABLET ORAL
Qty: 60 TABLET | Refills: 11 | Status: SHIPPED | OUTPATIENT
Start: 2022-11-30

## 2022-11-30 NOTE — TELEPHONE ENCOUNTER
Patient called stating that she needs a refill of her Tizanidine 4mg tablet and Vitamin D. Confirmed [facility] as [facility].

## 2022-12-01 RX ORDER — ROSUVASTATIN CALCIUM 20 MG/1
20 TABLET, COATED ORAL
Qty: 90 TABLET | Refills: 0 | Status: SHIPPED | OUTPATIENT
Start: 2022-12-01

## 2022-12-01 NOTE — PROGRESS NOTES
Patient cholesterol is very elevated. LDL\" bad cholesterol\" should be less than 100 and total cholesterol should be less than 200. I would like to increase her cholesterol medication to 20 mg daily. I would like her to increase her water intake. Let me know if she has any muscle pain or dark colored urine. I will send in new prescription. Her triglycerides are also elevated and the best way to bring that number down is to incorporate more omega 3's into your diet. Here are some suggestions on how to do that:  - eat 2-3 serving of fish like salmon and trout per week  - eat lots of fresh dark leafy green vegetables  - incorporate more garlic into your diet    All other labs are unremarkable. Please stress healthy eating and increasing exercise.

## 2023-01-19 DIAGNOSIS — F41.9 ANXIETY AND DEPRESSION: ICD-10-CM

## 2023-01-19 DIAGNOSIS — F32.A ANXIETY AND DEPRESSION: ICD-10-CM

## 2023-01-19 RX ORDER — RIMEGEPANT SULFATE 75 MG/75MG
TABLET, ORALLY DISINTEGRATING ORAL
Qty: 16 TABLET | Refills: 11 | Status: CANCELLED | OUTPATIENT
Start: 2023-01-19

## 2023-01-19 RX ORDER — DULOXETIN HYDROCHLORIDE 60 MG/1
CAPSULE, DELAYED RELEASE ORAL
Qty: 90 CAPSULE | Refills: 0 | Status: SHIPPED | OUTPATIENT
Start: 2023-01-19

## 2023-01-19 RX ORDER — BUTALBITAL, ACETAMINOPHEN AND CAFFEINE 50; 325; 40 MG/1; MG/1; MG/1
1 TABLET ORAL
Qty: 40 TABLET | Refills: 3 | Status: CANCELLED | OUTPATIENT
Start: 2023-01-19

## 2023-01-23 NOTE — TELEPHONE ENCOUNTER
Requested Prescriptions     Pending Prescriptions Disp Refills    Nurtec ODT 75 mg disintegrating tablet 16 Tablet 11     Si tab p.o.qod, then 1 p.o. prn for severe headache    butalbital-acetaminophen-caffeine (FIORICET, ESGIC) -40 mg per tablet 40 Tablet 3     Sig: Take 1 Tablet by mouth every six (6) hours as needed for Migraine. Take 1 Tab by mouth as needed for Migraine.      Last fill Nurtec 22 # 16    Last fill Fioricet 22 # 36     Last visit Dr Olegario Flood  22    Next visit 3/7/23

## 2023-01-24 RX ORDER — RIMEGEPANT SULFATE 75 MG/75MG
TABLET, ORALLY DISINTEGRATING ORAL
Qty: 16 TABLET | Refills: 3 | Status: SHIPPED | OUTPATIENT
Start: 2023-01-24

## 2023-01-24 RX ORDER — BUTALBITAL, ACETAMINOPHEN AND CAFFEINE 50; 325; 40 MG/1; MG/1; MG/1
1 TABLET ORAL
Qty: 20 TABLET | Refills: 1 | Status: SHIPPED | OUTPATIENT
Start: 2023-01-24

## 2023-02-02 RX ORDER — OLMESARTAN MEDOXOMIL AND HYDROCHLOROTHIAZIDE 40/25 40; 25 MG/1; MG/1
1 TABLET ORAL DAILY
Qty: 90 TABLET | Refills: 1 | Status: SHIPPED | OUTPATIENT
Start: 2023-02-02

## 2023-02-02 NOTE — TELEPHONE ENCOUNTER
Cardiologist: Dr. Munira Duran    Last appt: 6/15/2022  Future Appointments   Date Time Provider Namrata Samantha   2/28/2023  9:30 AM Casper Collins NP [facility] BS AMB   3/7/2023 11:00 AM Talib Orourke NP [facility] BS AMB   6/16/2023  9:20 AM Rayo Pitts MD [facility] BS AMB       Requested Prescriptions     Signed Prescriptions Disp Refills    olmesartan-hydroCHLOROthiazide (BENICAR HCT) 40-25 mg per tablet 90 Tablet 1     Sig: Take 1 Tablet by mouth daily. Authorizing Provider: Chalino Berman     Ordering User: TUNDE HOOVER         Refbert VO per Dr. Munira Duran.

## 2023-03-03 ENCOUNTER — OFFICE VISIT (OUTPATIENT)
Dept: FAMILY MEDICINE CLINIC | Age: 53
End: 2023-03-03

## 2023-03-03 VITALS
OXYGEN SATURATION: 96 % | TEMPERATURE: 98.5 F | BODY MASS INDEX: 32.13 KG/M2 | RESPIRATION RATE: 17 BRPM | HEART RATE: 85 BPM | HEIGHT: 68 IN | WEIGHT: 212 LBS | SYSTOLIC BLOOD PRESSURE: 130 MMHG | DIASTOLIC BLOOD PRESSURE: 85 MMHG

## 2023-03-03 DIAGNOSIS — F43.9 STRESS AT HOME: Primary | ICD-10-CM

## 2023-03-03 DIAGNOSIS — I10 PRIMARY HYPERTENSION: ICD-10-CM

## 2023-03-03 DIAGNOSIS — E78.00 HYPERCHOLESTEREMIA: ICD-10-CM

## 2023-03-03 DIAGNOSIS — F41.9 ANXIETY AND DEPRESSION: ICD-10-CM

## 2023-03-03 DIAGNOSIS — F32.A ANXIETY AND DEPRESSION: ICD-10-CM

## 2023-03-03 NOTE — PROGRESS NOTES
Chief Complaint   Patient presents with    [address] [facility] Maintenance reviewed     1. Have you been to the ER, urgent care clinic since your last visit? Hospitalized since your last visit? No     2. Have you seen or consulted any other health care providers outside of the [address] [place] [address] since your last visit? Include any pap smears or colon screening.   No

## 2023-03-03 NOTE — PROGRESS NOTES
Gala Carvajal is a 47 y.o. female  and presents with   Chief Complaint   Patient presents with    Gyn Exam     Patient originally here for well woman exam however at the start of the visit patient was very tearful. Patient states  of 16 years decided to move out in January. States he also had an affair 2 years ago. Per patient he informed daughter of his plan to leave and he went and set up an apartment without her acknowledge. Today she is going to see a  to discuss next steps. This has been very stressful for her. She has a history of anxiety and depression. Currently on cymbalta and xanax. She does have support from her friends. She denies SI/HI She denies needing a change in medication at this time just trying to figure out next steps. Seeing a psychiatrist online but no counselor. Cardiovascular Review:  The patient has essential hypertension and hyperlipidemia  Current medication: Crestor, olmesartan-HCTZ   Compliance? Yes  Home BP Monitoring: Yes  Pertinent ROS: no TIA's, no chest pain on exertion, no dyspnea on exertion, no swelling of ankles. Nutrition Hx:  Diet: Skips breakfast, Does hello fresh, cooking more at home. 24-32 oz per day   Exercise: walking 3 days a week and doing pilates. Current Outpatient Medications on File Prior to Visit   Medication Sig Dispense Refill    olmesartan-hydroCHLOROthiazide (BENICAR HCT) 40-25 mg per tablet Take 1 Tablet by mouth daily. 90 Tablet 1    Nurtec ODT 75 mg disintegrating tablet 1 tab p.o.qod, then 1 p.o. prn for severe headache 16 Tablet 3    butalbital-acetaminophen-caffeine (FIORICET, ESGIC) -40 mg per tablet Take 1 Tablet by mouth every six (6) hours as needed for Migraine. Take 1 Tab by mouth as needed for Migraine. 20 Tablet 1    DULoxetine (CYMBALTA) 60 mg capsule TAKE 1 CAPSULE BY MOUTH EVERY DAY 90 Capsule 0    rosuvastatin (CRESTOR) 20 mg tablet Take 1 Tablet by mouth nightly.  90 Tablet 0    tiZANidine (ZANAFLEX) 4 mg tablet TAKE 1 TO 2 TABLETS BY MOUTH AT BEDTIME 60 Tablet 11    Emgality Pen 120 mg/mL injection INJECT 1ML SUBCUTANEOUSLY EVERY MONTH 1 Each 5    metoprolol succinate (TOPROL-XL) 100 mg tablet TAKE 1 TABLET BY MOUTH EVERY DAY 90 Tablet 1    amitriptyline (ELAVIL) 10 mg tablet Take 1 Tablet by mouth nightly. 90 Tablet 3    ALPRAZolam (XANAX) 0.5 mg tablet TAKE 1/2 TO 1 TABLET BY MOUTH DAILY AS NEEDED FOR ANXIETY      ondansetron (ZOFRAN ODT) 4 mg disintegrating tablet Take 1 Tablet by mouth every eight (8) hours as needed for Nausea or Vomiting. 20 Tablet 0    magnesium 250 mg tab Take 500 mg by mouth. No current facility-administered medications on file prior to visit.       Past Medical History:   Diagnosis Date    Anxiety and depression     HTN (hypertension)     Migraines      Past Surgical History:   Procedure Laterality Date    HX APPENDECTOMY  10/18/2021    HX  SECTION      HX DILATION AND CURETTAGE      HX ORTHOPAEDIC  2010    titanium conchita, cervical spine     Social History     Socioeconomic History    Marital status:      Spouse name: Not on file    Number of children: Not on file    Years of education: Not on file    Highest education level: Not on file   Occupational History    Not on file   Tobacco Use    Smoking status: Never    Smokeless tobacco: Never   Vaping Use    Vaping Use: Never used   Substance and Sexual Activity    Alcohol use: Yes     Comment: 2-3 glasses of wine/month    Drug use: Never    Sexual activity: Not on file   Other Topics Concern    Not on file   Social History Narrative    Not on file     Social Determinants of Health     Financial Resource Strain: Not on file   Food Insecurity: Not on file   Transportation Needs: Not on file   Physical Activity: Not on file   Stress: Not on file   Social Connections: Not on file   Intimate Partner Violence: Not on file   Housing Stability: Not on file     Allergies   Allergen Reactions    Latex Hives, Itching and Other (comments)     Swelling at contact areas    [place] Swelling    Bactrim [Sulfamethoprim] Hives       Review of Systems   Constitutional:  Negative for chills, diaphoresis, fever, malaise/fatigue and weight loss. HENT: Negative. Eyes: Negative. Respiratory:  Negative for cough and shortness of breath. Cardiovascular:  Negative for chest pain, palpitations and leg swelling. Neurological:  Negative for dizziness, tingling, weakness and headaches. Endo/Heme/Allergies: Negative. Psychiatric/Behavioral:          Detailed in HPI          Physical Examination:    Visit Vitals  /85 (BP 1 Location: Left upper arm, BP Patient Position: Sitting, BP Cuff Size: Adult)   Pulse 85   Temp 98.5 °F (36.9 °C) (Temporal)   Resp 17   Ht 5' 7.5\" (1.715 m)   Wt 212 lb (96.2 kg)   SpO2 96%   BMI 32.71 kg/m²      Physical Exam  Vitals and nursing note reviewed. Constitutional:       General: She is not in acute distress. Appearance: Normal appearance. HENT:      Head: Normocephalic. Right Ear: Tympanic membrane, ear canal and external ear normal.      Left Ear: Tympanic membrane, ear canal and external ear normal.      Nose: Nose normal.      Mouth/Throat:      Mouth: Mucous membranes are moist.      Pharynx: Oropharynx is clear. Eyes:      Extraocular Movements: Extraocular movements intact. Conjunctiva/sclera: Conjunctivae normal.      Pupils: Pupils are equal, round, and reactive to light. Neck:      Thyroid: No thyromegaly or thyroid tenderness. Vascular: No carotid bruit. Cardiovascular:      Rate and Rhythm: Normal rate and regular rhythm. Pulses: Normal pulses. Heart sounds: Normal heart sounds. Pulmonary:      Effort: Pulmonary effort is normal. No respiratory distress. Breath sounds: Normal breath sounds. Musculoskeletal:      Cervical back: No tenderness. Right lower leg: No edema. Left lower leg: No edema.    Lymphadenopathy: Cervical: No cervical adenopathy. Skin:     General: Skin is warm and dry. Neurological:      General: No focal deficit present. Mental Status: She is alert and oriented to person, place, and time. Mental status is at baseline. Psychiatric:         Attention and Perception: Attention and perception normal.         Mood and Affect: Affect is tearful. Speech: Speech normal.         Behavior: Behavior normal. Behavior is cooperative. Thought Content: Thought content normal.         Cognition and Memory: Cognition normal.         Judgment: Judgment normal.      3 most recent PHQ Screens 3/3/2023   Little interest or pleasure in doing things Several days   Feeling down, depressed, irritable, or hopeless Several days   Total Score PHQ 2 2   Trouble falling or staying asleep, or sleeping too much -   Feeling tired or having little energy -   Poor appetite, weight loss, or overeating -   Feeling bad about yourself - or that you are a failure or have let yourself or your family down -   Trouble concentrating on things such as school, work, reading, or watching TV -   Moving or speaking so slowly that other people could have noticed; or the opposite being so fidgety that others notice -   Thoughts of being better off dead, or hurting yourself in some way -   PHQ 9 Score -      Assessment/Plan:  Differential diagnosis and treatment options reviewed with patient who is in agreement with treatment plan as outlined below. ICD-10-CM ICD-9-CM    1. Stress at home  F43.9 V61.9       2. Anxiety and depression  F41.9 300.00     F32. A 311       3. Primary hypertension  I10 401.9       4. Hypercholesteremia  E78.00 272.0         Will reschedule pap smear to a later date due patient home situation. Patient not up for well woman exam.   Was empathic to patient needs today. Advised patient to follow-up with psychiatrist and discussed the benefits of counseling. Denies SI/HI.    Continue with current medications. Follow-up if symptoms worsen. Current treatment plan is effective, continue with current medications  Discussed diet, exercise and lifestyle management for hypertension  BMI  Discussed hypertensive warning signs, follow up if develop  Follow up in 3 months    Medication Side Effects and Warnings were discussed with patient: yes  Patient Labs were reviewed: yes  Patient Past Records were reviewed:  yes    Follow-up and Dispositions    Return in about 3 months (around 6/3/2023), or if symptoms worsen or fail to improve, for pap smear . Verbal and written instructions (see AVS) provided. Patient expresses understanding and agreement of diagnosis and treatment plan.     George Romo NP

## 2023-03-03 NOTE — PATIENT INSTRUCTIONS
DASH Diet: Care Instructions  Your Care Instructions     The DASH diet is an eating plan that can help lower your blood pressure. DASH stands for Dietary Approaches to Stop Hypertension. Hypertension is high blood pressure. The DASH diet focuses on eating foods that are high in calcium, potassium, and magnesium. These nutrients can lower blood pressure. The foods that are highest in these nutrients are fruits, vegetables, low-fat dairy products, nuts, seeds, and legumes. But taking calcium, potassium, and magnesium supplements instead of eating foods that are high in those nutrients does not have the same effect. The DASH diet also includes whole grains, fish, and poultry. The DASH diet is one of several lifestyle changes your doctor may recommend to lower your high blood pressure. Your doctor may also want you to decrease the amount of sodium in your diet. Lowering sodium while following the DASH diet can lower blood pressure even further than just the DASH diet alone. Follow-up care is a key part of your treatment and safety. Be sure to make and go to all appointments, and call your doctor if you are having problems. It's also a good idea to know your test results and keep a list of the medicines you take. How can you care for yourself at home? Following the DASH diet  Eat 4 to 5 servings of fruit each day. A serving is 1 medium-sized piece of fruit, ½ cup chopped or canned fruit, 1/4 cup dried fruit, or 4 ounces (½ cup) of fruit juice. Choose fruit more often than fruit juice. Eat 4 to 5 servings of vegetables each day. A serving is 1 cup of lettuce or raw leafy vegetables, ½ cup of chopped or cooked vegetables, or 4 ounces (½ cup) of vegetable juice. Choose vegetables more often than vegetable juice. Get 2 to 3 servings of low-fat and fat-free dairy each day. A serving is 8 ounces of milk, 1 cup of yogurt, or 1 ½ ounces of cheese. Eat 6 to 8 servings of grains each day.  A serving is 1 slice of bread, 1 ounce of dry cereal, or ½ cup of cooked rice, pasta, or cooked cereal. Try to choose whole-grain products as much as possible. Limit lean meat, poultry, and fish to 2 servings each day. A serving is 3 ounces, about the size of a deck of cards. Eat 4 to 5 servings of nuts, seeds, and legumes (cooked dried beans, lentils, and split peas) each week. A serving is 1/3 cup of nuts, 2 tablespoons of seeds, or ½ cup of cooked beans or peas. Limit fats and oils to 2 to 3 servings each day. A serving is 1 teaspoon of vegetable oil or 2 tablespoons of salad dressing. Limit sweets and added sugars to 5 servings or less a week. A serving is 1 tablespoon jelly or jam, ½ cup sorbet, or 1 cup of lemonade. Eat less than 2,300 milligrams (mg) of sodium a day. If you limit your sodium to 1,500 mg a day, you can lower your blood pressure even more. Be aware that all of these are the suggested number of servings for people who eat 1,800 to 2,000 calories a day. Your recommended number of servings may be different if you need more or fewer calories. Tips for success  Start small. Do not try to make dramatic changes to your diet all at once. You might feel that you are missing out on your favorite foods and then be more likely to not follow the plan. Make small changes, and stick with them. Once those changes become habit, add a few more changes. Try some of the following:  Make it a goal to eat a fruit or vegetable at every meal and at snacks. This will make it easy to get the recommended amount of fruits and vegetables each day. Try yogurt topped with fruit and nuts for a snack or healthy dessert. Add lettuce, tomato, cucumber, and onion to sandwiches. Combine a ready-made pizza crust with low-fat mozzarella cheese and lots of vegetable toppings. Try using tomatoes, squash, spinach, broccoli, carrots, cauliflower, and onions.   Have a variety of cut-up vegetables with a low-fat dip as an appetizer instead of chips and dip.  Sprinkle sunflower seeds or chopped almonds over salads. Or try adding chopped walnuts or almonds to cooked vegetables. Try some vegetarian meals using beans and peas. Add garbanzo or kidney beans to salads. Make burritos and tacos with mashed vásquez beans or black beans. Where can you learn more? Go to http://www.bingham.com/  Enter H967 in the search box to learn more about \"DASH Diet: Care Instructions. \"  Current as of: January 10, 2022               Content Version: 13.4  © 5844-7943 [employer]. Care instructions adapted under license by [employer] (which disclaims liability or warranty for this information). If you have questions about a medical condition or this instruction, always ask your healthcare professional. [address] any warranty or liability for your use of this information.

## 2023-03-04 DIAGNOSIS — E78.00 HYPERCHOLESTEREMIA: ICD-10-CM

## 2023-03-04 RX ORDER — ROSUVASTATIN CALCIUM 20 MG/1
TABLET, COATED ORAL
Qty: 90 TABLET | Refills: 0 | Status: SHIPPED | OUTPATIENT
Start: 2023-03-04

## 2023-03-07 ENCOUNTER — OFFICE VISIT (OUTPATIENT)
Dept: NEUROLOGY | Age: 53
End: 2023-03-07
Payer: COMMERCIAL

## 2023-03-07 VITALS
SYSTOLIC BLOOD PRESSURE: 130 MMHG | OXYGEN SATURATION: 98 % | DIASTOLIC BLOOD PRESSURE: 80 MMHG | WEIGHT: 216 LBS | HEART RATE: 94 BPM | HEIGHT: 68 IN | BODY MASS INDEX: 32.74 KG/M2 | TEMPERATURE: 97.9 F | RESPIRATION RATE: 16 BRPM

## 2023-03-07 DIAGNOSIS — G44.86 CERVICOGENIC HEADACHE: ICD-10-CM

## 2023-03-07 DIAGNOSIS — M50.30 DDD (DEGENERATIVE DISC DISEASE), CERVICAL: ICD-10-CM

## 2023-03-07 DIAGNOSIS — R20.0 NUMBNESS OF RIGHT HAND: ICD-10-CM

## 2023-03-07 DIAGNOSIS — G43.119 INTRACTABLE MIGRAINE WITH AURA WITHOUT STATUS MIGRAINOSUS: Primary | ICD-10-CM

## 2023-03-07 PROCEDURE — 3079F DIAST BP 80-89 MM HG: CPT | Performed by: NURSE PRACTITIONER

## 2023-03-07 PROCEDURE — 3075F SYST BP GE 130 - 139MM HG: CPT | Performed by: NURSE PRACTITIONER

## 2023-03-07 PROCEDURE — 99214 OFFICE O/P EST MOD 30 MIN: CPT | Performed by: NURSE PRACTITIONER

## 2023-03-07 RX ORDER — AMITRIPTYLINE HYDROCHLORIDE 10 MG/1
TABLET, FILM COATED ORAL
Qty: 60 TABLET | Refills: 2 | Status: SHIPPED | OUTPATIENT
Start: 2023-03-07

## 2023-03-07 NOTE — PROGRESS NOTES
Chief Complaint   Patient presents with    Follow-up    Migraine     1. Have you been to the ER, urgent care clinic since your last visit? No  Hospitalized since your last visit? No     2. Have you seen or consulted any other health care providers outside of the [address] [place] [address] since your last visit? Yes Mental health Include any pap smears or colon screening. No     Patient C/O anxiety has migraines 10-12 monthly lasting overnight.

## 2023-03-07 NOTE — PROGRESS NOTES
[facility]  [address]  Michaela Cardoza  Tel: [phone]  Fax: [phone]      Date:  23     Name:  Stephania Graham  :  1970  MRN:  [id number]     PCP:  Lisette Noe NP    Chief Complaint   Patient presents with    Follow-up    Migraine       HISTORY OF PRESENT ILLNESS:  Patient presents today for regular follow up, last seen by Dr Arianne Rodriguez, 2022. She is seen for migraine headaches, she complains of a lot of stressors as well. She notes her  left her earlier this year, pt had a meltdown. She is still very overwhelmed. Psychiatry: working with them. Emgality: unsure how much it's helping, would help a little bit for about 3 weeks then it will wear off and she will have worsening migraines, she does note the Nurtec to be helpful. Frequency: 18-20 headache days a month. C/o some n/v.  C/o light and sound sensitivity. Aura : at times  Location: right side of her head, can be from her neck at times  Characteristics: knot  Relieving: Heat, Ice, theragun, Nurtec. Triggers: rainy weather, heat,stress, certain foods. Sleep is better. Walking about 3 x week. Diet: not karla good right now. Didn't do well with Botox. C4-C5 cervical surgery , c/o pinching pain in left shoulder and neck area. Hasn't had any recent imaging,Saw a Spine surgery, while in [place], she saw another provider, deferred surgery due to complications. No recent PT, helps for the short term,  Massage therapy when she can afford it. REVIEW OF SYSTEMS:     Review of Systems   Eyes:  Positive for photophobia. Gastrointestinal:  Positive for nausea and vomiting. Musculoskeletal:  Positive for neck pain. Negative for falls. Neurological:  Positive for dizziness (not in a while though), sensory change (right hand is numb in the morning) and headaches. Psychiatric/Behavioral:  Positive for depression. The patient is nervous/anxious.  The patient does not have insomnia. All other systems reviewed and are negative. Current Outpatient Medications   Medication Sig    rimegepant (NURTEC) 75 mg disintegrating tablet 1 tablet po every other day  Indications: migraine prevention    amitriptyline (ELAVIL) 10 mg tablet 1-2 po qhs    rosuvastatin (CRESTOR) 20 mg tablet TAKE 1 TABLET BY MOUTH EVERY NIGHT    olmesartan-hydroCHLOROthiazide (BENICAR HCT) 40-25 mg per tablet Take 1 Tablet by mouth daily. butalbital-acetaminophen-caffeine (FIORICET, ESGIC) -40 mg per tablet Take 1 Tablet by mouth every six (6) hours as needed for Migraine. Take 1 Tab by mouth as needed for Migraine. DULoxetine (CYMBALTA) 60 mg capsule TAKE 1 CAPSULE BY MOUTH EVERY DAY    tiZANidine (ZANAFLEX) 4 mg tablet TAKE 1 TO 2 TABLETS BY MOUTH AT BEDTIME    Emgality Pen 120 mg/mL injection INJECT 1ML SUBCUTANEOUSLY EVERY MONTH    metoprolol succinate (TOPROL-XL) 100 mg tablet TAKE 1 TABLET BY MOUTH EVERY DAY    ALPRAZolam (XANAX) 0.5 mg tablet TAKE 1/2 TO 1 TABLET BY MOUTH DAILY AS NEEDED FOR ANXIETY    magnesium 250 mg tab Take 500 mg by mouth. ondansetron (ZOFRAN ODT) 4 mg disintegrating tablet Take 1 Tablet by mouth every eight (8) hours as needed for Nausea or Vomiting. (Patient not taking: Reported on 3/7/2023)     No current facility-administered medications for this visit.      Allergies   Allergen Reactions    Latex Hives, Itching and Other (comments)     Swelling at contact areas    Borup Swelling    Bactrim [Sulfamethoprim] Hives     Past Medical History:   Diagnosis Date    Anxiety and depression     HTN (hypertension)     Migraines      Past Surgical History:   Procedure Laterality Date    HX APPENDECTOMY  10/18/2021    HX  SECTION      HX DILATION AND CURETTAGE      HX ORTHOPAEDIC  2010    titanium conchita, cervical spine     Social History     Socioeconomic History    Marital status:      Spouse name: Not on file    Number of children: Not on file    Years of education: Not on file    Highest education level: Not on file   Occupational History    Not on file   Tobacco Use    Smoking status: Never    Smokeless tobacco: Never   Vaping Use    Vaping Use: Never used   Substance and Sexual Activity    Alcohol use: Yes     Comment: 2-3 glasses of wine/month    Drug use: Never     Comment: CBC gummies night    Sexual activity: Not on file   Other Topics Concern    Not on file   Social History Narrative    Not on file     Social Determinants of Health     Financial Resource Strain: Not on file   Food Insecurity: Not on file   Transportation Needs: Not on file   Physical Activity: Not on file   Stress: Not on file   Social Connections: Not on file   Intimate Partner Violence: Not on file   Housing Stability: Not on file     Family History   Problem Relation Age of Onset    Stroke Mother     Cancer Mother         brain tumor    Heart Disease Father     Prostate Cancer Father     Hypertension Sister     Heart Attack Sister     Alzheimer's Disease Maternal Grandmother     Heart Attack Maternal Grandmother     Hypertension Maternal Grandmother     Lung Cancer Paternal Grandmother     Other Paternal Grandfather         cirrhosis      EXAM:  MRI BRAIN W WO CONT  INDICATION:  Intractable migraine, headache disorder, dizziness, migraine with  vertigo, \"recent onset of ice pick migraines daily\". TECHNIQUE:  Sagittal T1, axial FLAIR, T2, T1 and gradient echo T2-weighted images of the  head were obtained followed by intravenous infusion 18 mL ProHance repeat axial  and coronal T1-weighted images and axial diffusion weighted images. COMPARISON: None available. FINDINGS:  The ventricular size and configuration are normal.   Normal signal demonstrated in the cerebral hemispheres, brain stem and  cerebellum. No abnormal areas of intracranial enhancement. No abnormal diffusion. No evidence of intracranial hemorrhage, infarct, mass or abnormal extra-axial  fluid collections.    Flow voids are present in the vertebral, basilar and carotid artery systems. Slight low-lying cerebellar tonsils are still within the range of normal. The  craniocervical junction is otherwise unremarkable. The structures of the cranial base including paranasal sinuses are   unremarkable. IMPRESSION  Normal MRI of the head. PHYSICAL EXAMINATION:    Visit Vitals  /80 (BP 1 Location: Left upper arm, BP Patient Position: Sitting, BP Cuff Size: Large adult)   Pulse 94   Temp 97.9 °F (36.6 °C) (Temporal)   Resp 16   Ht 5' 7.5\" (1.715 m)   Wt 216 lb (98 kg)   SpO2 98%   BMI 33.33 kg/m²       General:  Well defined, nourished, and well groomed individual in no acute distress. Neck: Supple, slightly tender, decreased range of motion. Positive spurlings. Musculoskeletal:  Extremities revealed no edema and had full range of motion of joints. R>L trap taut and tender. Psych:  Good mood and bright affect. NEUROLOGICAL EXAMINATION:     Mental Status:   Alert and oriented to person, place, and time with recent and remote memory intact. Attention span and concentration are normal. Clear speech. Fund of knowledge preserved. Cranial Nerves:   PERRLA. Visual fields were full  EOM: no evidence of nystagmus  Facial sensation:  normal and symmetric  Facial motor: normal and symmetric, no facial droop noted. Hearing intact  SCM strength intact  Tongue: midline without fasciculations    Motor Examination: Normal tone. 5/5 muscle strength in bilateral upper extremities. No muscle wasting, no twitching or fasciculation noted. Sensory exam:  Normal throughout to light touch in BUE. Negative Tinels to R wrist.    Coordination:   Finger to nose and rapid arm movement testing was normal.  No resting or intention tremor. Negative pronator drift. Gait and Station:  Steady gait. Normal arm swing. Reflexes:  DTRs 2+ in bilateral biceps, brachioradialis, patella.     ASSESSMENT AND PLAN      ICD-10-CM ICD-9-CM 1. Intractable migraine with aura without status migrainosus  G43.119 346.01 rimegepant (NURTEC) 75 mg disintegrating tablet      amitriptyline (ELAVIL) 10 mg tablet      2. Cervicogenic headache  G44.86 784.0       3. Numbness of right hand  R20.0 782.0 MRI CERV SPINE W WO CONT      4. DDD (degenerative disc disease), cervical  M50.30 722.4 MRI CERV SPINE W WO CONT        1. Intractable migraine with aura without status migrainosus: Patient notes a lack of efficacy with the Emgality therefore my plan is to discontinue the Emgality and place the patient on Nurtec 75 mg as a preventative, taking it every other day. I would also like to increase patient's amitriptyline from 10 mg nightly up to 10-20mg at bedtime. Healthy lifestyle was encouraged, she is notify me if any major changes or worsening signs or symptoms. Patient does have Fioricet that she can take as well. -     rimegepant (NURTEC) 75 mg disintegrating tablet; 1 tablet po every other day  Indications: migraine prevention, Normal, Disp-16 Tablet, R-5  -     amitriptyline (ELAVIL) 10 mg tablet; 1-2 po qhs, Normal, Disp-60 Tablet, R-2  2. Cervicogenic headache: We will update her cervical imaging, modify plan of care based on results. See medication changes above. 3. Numbness of right hand: Cervical MRI to be completed, modify plan of care based on results. Consider EMG? .  -     MRI CERV SPINE W WO CONT; Future  4. DDD (degenerative disc disease), cervical: Patient has had no recent imaging, she continues to complain of discomfort in the cervical spine as well as residual symptoms, therefore we will proceed with ordering a cervical MRI, modify plan of care based on results. -     MRI CERV SPINE W WO CONT; Future    Patient and/or family verbalized understand of all instructions and all questions/concerns were addressed. Safety/side effects of medications discussed.     Patient remains a complex patient secondary to polypharmacy, significant comorbid conditions, and use of high-risk medications which complicate the decision making process related to patient's neurologic diagnosis. We will see the patient back in 6 months, sooner if needed.       Marina Mckinney, VIKRAMP-BC

## 2023-03-13 RX ORDER — METOPROLOL SUCCINATE 100 MG/1
TABLET, EXTENDED RELEASE ORAL
Qty: 90 TABLET | Refills: 1 | Status: SHIPPED | OUTPATIENT
Start: 2023-03-13

## 2023-03-13 NOTE — TELEPHONE ENCOUNTER
Cardiologist: Dr. Daphney Kilpatrick    Last appt: 6/15/2022  Future Appointments   Date Time Provider Namrata Singh   3/20/2023 10:30 AM SPT MRI 1 SPTMRI SPT   4/5/2023 11:00 AM Sharri Goode NP [facility] BS AMB   6/16/2023  9:20 AM MD MIGUEL ANGEL Abrams BS AMB   9/15/2023 11:00 AM Yehuda Orourke Friday, GIULIANO CHEEK BS AMB       Requested Prescriptions     Signed Prescriptions Disp Refills    metoprolol succinate (TOPROL-XL) 100 mg tablet 90 Tablet 1     Sig: TAKE 1 TABLET BY MOUTH DAILY     Authorizing Provider: Ty Ma     Ordering User: TUNDE HOOVER         Refills VO per Dr. Daphney Kilpatrick.

## 2023-03-14 ENCOUNTER — TELEPHONE (OUTPATIENT)
Dept: NEUROLOGY | Age: 53
End: 2023-03-14

## 2023-03-14 NOTE — TELEPHONE ENCOUNTER
[facility] w/ BS Auth Department called stating that the MRI Cerv Spine W WO Contrast is needing a peer to peer. The insurance company needs more information. Please call [employer] [phone] to complete peer to peer.

## 2023-03-21 NOTE — TELEPHONE ENCOUNTER
Patient informed of approval for MRI she also states pharmacy is not giving her Nurtec to last for the month recommend she check with the pharmacy on which script they are filling. Let us know if need us to do anything.

## 2023-04-04 NOTE — PROGRESS NOTES
Chief Complaint   Patient presents with    Physical    Gyn Exam      Subjective:   46 y.o. female for Well Woman Check. Patient's last menstrual period was 10/20/2022. The last 2 years menstrual cycles are irregular, had about 3 menstrual cycles. Prior to this she had menstrual cycles every month without any problems. She does endorse feeling hot and uses a fan to help with hot flashes. The fan does work well. Social History: not sexually active. Pertinent past medical hstory: hypertension, migraines.        ROS:  Feeling well. No dyspnea or chest pain on exertion. No abdominal pain, change in bowel habits, black or bloody stools. No urinary tract symptoms. GYN ROS: no breast pain or new or enlarging lumps on self exam, no vaginal bleeding, no discharge or pelvic pain. No neurological complaints. Objective:   Visit Vitals  /81 (BP 1 Location: Right arm, BP Patient Position: Sitting, BP Cuff Size: Large adult)   Pulse 65   Resp 16   Ht 5' 7.5\" (1.715 m)   Wt 215 lb 9.6 oz (97.8 kg)   LMP 10/20/2022   SpO2 96%   BMI 33.27 kg/m²     The patient appears well, alert, oriented x 3, in no distress. ENT normal.  Neck supple. No adenopathy or thyromegaly. LINDEN. Lungs are clear, good air entry, no wheezes, rhonchi or rales. S1 and S2 normal, no murmurs, regular rate and rhythm. Abdomen soft without tenderness, guarding, mass or organomegaly. Extremities show no edema, normal peripheral pulses. Neurological is normal, no focal findings.     BREAST EXAM: breasts appear normal, no suspicious masses, no skin or nipple changes or axillary nodes    PELVIC EXAM: VULVA: normal appearing vulva with no masses, tenderness or lesions, scattered vulvar nodule that are white and firm-no erythema or tenderness, VAGINA: normal appearing vagina with normal color and discharge, no lesions, CERVIX: lesions absent, cervical discharge present - white, cervical motion tenderness absent, UTERUS: uterus is normal size, shape, consistency and nontender, ADNEXA: normal adnexa in size, nontender and no masses, PAP: Pap smear done today    Assessment/Plan:   well woman  pap smear  counseled on breast self exam, mammography screening, menopause, osteoporosis, and adequate intake of calcium and vitamin D  Plans to reschedule mammogram.   return annually or prn    ICD-10-CM ICD-9-CM    1. Well woman exam with routine gynecological exam  Z01.419 V72.31     [V72.31]      2. Perimenopausal  N95.1 627.2       3. Screening for malignant neoplasm of cervix  Z12.4 V76.2 PAP IG, APTIMA HPV AND RFX 16/18,45 ([id number])      PAP IG, APTIMA HPV AND RFX 16/18,45 ([id number])      4. Encounter for immunization  Z23 V03.89 TDAP, BOOSTRIX, (AGE 10 YRS+), IM      Patient agrees with plan and verbalized understanding. AVS given.      Ashok Garibay NP

## 2023-04-05 ENCOUNTER — OFFICE VISIT (OUTPATIENT)
Dept: FAMILY MEDICINE CLINIC | Age: 53
End: 2023-04-05

## 2023-04-05 ENCOUNTER — HOSPITAL ENCOUNTER (OUTPATIENT)
Facility: HOSPITAL | Age: 53
Setting detail: SPECIMEN
Discharge: HOME | End: 2023-04-08
Payer: COMMERCIAL

## 2023-04-05 PROCEDURE — 88175 CYTOPATH C/V AUTO FLUID REDO: CPT

## 2023-04-05 PROCEDURE — 87624 HPV HI-RISK TYP POOLED RSLT: CPT

## 2023-04-05 PROCEDURE — 87625 HPV TYPES 16 & 18 ONLY: CPT

## 2023-04-05 NOTE — PROGRESS NOTES
Chief Complaint   Patient presents with    Physical    Gyn Exam         Health Maintenance Due   Topic Date Due    DTaP/Tdap/Td series (1 - Tdap) Never done    Shingles Vaccine (1 of 2) Never done    Breast Cancer Screen Mammogram  Never done    COVID-19 Vaccine (4 - Booster for Moderna series) 01/10/2022    Cervical cancer screen  06/03/2022       1. \"Have you been to the ER, urgent care clinic since your last visit? Hospitalized since your last visit? \" No    2. \"Have you seen or consulted any other health care providers outside of the [address] [place] [address] since your last visit? \" No     3. For patients aged 39-70: Has the patient had a colonoscopy / FIT/ Cologuard? No      If the patient is female:    4. For patients aged 41-77: Has the patient had a mammogram within the past 2 years? No      5. For patients aged 21-65: Has the patient had a pap smear? No pt is getting one today during appt.

## 2023-04-19 RX ORDER — BUTALBITAL, ACETAMINOPHEN AND CAFFEINE 50; 325; 40 MG/1; MG/1; MG/1
1 TABLET ORAL
Qty: 20 TABLET | Refills: 1 | Status: SHIPPED | OUTPATIENT
Start: 2023-04-19

## 2023-04-19 NOTE — TELEPHONE ENCOUNTER
Requested Prescriptions     Pending Prescriptions Disp Refills    butalbital-acetaminophen-caffeine (FIORICET, ESGIC) -40 mg per tablet 20 Tablet 1     Sig: Take 1 Tablet by mouth every six (6) hours as needed for Migraine. Take 1 Tab by mouth as needed for Migraine.     Last fill 1/24/23   Last visit 3/7/23   Next visit 9/15/23

## 2023-04-20 RX ORDER — BUTALBITAL, ACETAMINOPHEN AND CAFFEINE 50; 325; 40 MG/1; MG/1; MG/1
1 TABLET ORAL
Qty: 20 TABLET | Refills: 1 | OUTPATIENT
Start: 2023-04-20

## 2023-04-24 DIAGNOSIS — F41.9 ANXIETY AND DEPRESSION: ICD-10-CM

## 2023-04-24 DIAGNOSIS — F32.A ANXIETY AND DEPRESSION: ICD-10-CM

## 2023-04-24 RX ORDER — DULOXETIN HYDROCHLORIDE 60 MG/1
CAPSULE, DELAYED RELEASE ORAL
Qty: 90 CAPSULE | Refills: 0 | Status: SHIPPED | OUTPATIENT
Start: 2023-04-24

## 2023-05-16 RX ORDER — AMITRIPTYLINE HYDROCHLORIDE 10 MG/1
TABLET, FILM COATED ORAL
COMMUNITY
Start: 2023-03-07 | End: 2023-06-06 | Stop reason: SDUPTHER

## 2023-05-16 RX ORDER — DULOXETIN HYDROCHLORIDE 60 MG/1
1 CAPSULE, DELAYED RELEASE ORAL DAILY
COMMUNITY
Start: 2023-04-24

## 2023-05-16 RX ORDER — METOPROLOL SUCCINATE 100 MG/1
1 TABLET, EXTENDED RELEASE ORAL DAILY
COMMUNITY
Start: 2023-03-13

## 2023-05-16 RX ORDER — BUTALBITAL, ACETAMINOPHEN AND CAFFEINE 50; 325; 40 MG/1; MG/1; MG/1
1 TABLET ORAL EVERY 6 HOURS PRN
COMMUNITY
Start: 2023-04-19

## 2023-05-16 RX ORDER — OLMESARTAN MEDOXOMIL AND HYDROCHLOROTHIAZIDE 40/25 40; 25 MG/1; MG/1
1 TABLET ORAL DAILY
COMMUNITY
Start: 2023-02-02

## 2023-05-16 RX ORDER — ROSUVASTATIN CALCIUM 20 MG/1
1 TABLET, COATED ORAL NIGHTLY
COMMUNITY
Start: 2023-03-04 | End: 2023-06-05

## 2023-05-16 RX ORDER — TIZANIDINE 4 MG/1
TABLET ORAL
COMMUNITY
Start: 2022-11-30

## 2023-05-16 RX ORDER — ALPRAZOLAM 0.5 MG/1
TABLET ORAL
COMMUNITY
Start: 2021-12-28

## 2023-05-16 RX ORDER — ONDANSETRON 4 MG/1
4 TABLET, ORALLY DISINTEGRATING ORAL EVERY 8 HOURS PRN
COMMUNITY
Start: 2021-10-27

## 2023-06-05 RX ORDER — ROSUVASTATIN CALCIUM 20 MG/1
TABLET, COATED ORAL
Qty: 90 TABLET | Refills: 0 | Status: SHIPPED | OUTPATIENT
Start: 2023-06-05

## 2023-06-05 NOTE — TELEPHONE ENCOUNTER
Requested Prescriptions     Pending Prescriptions Disp Refills    amitriptyline (ELAVIL) 10 MG tablet 60 tablet 2     Si-2 po qhs    Last fill 23   Last visit 23   Next visit 09/15/23

## 2023-06-06 RX ORDER — AMITRIPTYLINE HYDROCHLORIDE 10 MG/1
TABLET, FILM COATED ORAL
Qty: 60 TABLET | Refills: 2 | Status: SHIPPED | OUTPATIENT
Start: 2023-06-06

## 2023-07-15 ENCOUNTER — PATIENT MESSAGE (OUTPATIENT)
Age: 53
End: 2023-07-15

## 2023-07-17 RX ORDER — BUTALBITAL, ACETAMINOPHEN AND CAFFEINE 50; 325; 40 MG/1; MG/1; MG/1
1 TABLET ORAL EVERY 6 HOURS PRN
Qty: 20 TABLET | Refills: 1 | Status: SHIPPED | OUTPATIENT
Start: 2023-07-17

## 2023-07-24 NOTE — PROGRESS NOTES
Chief Complaint   Patient presents with    Hypertension     3 month follow-up     Hyperlipidemia     3 month follow-up          Subjective:       Pelon Dawkins is a 46 y.o. female who presents for hypertension and HLD follow-up. Current Outpatient Medications on File Prior to Visit   Medication Sig Dispense Refill    Magnesium 500 MG TABS Take 500 mg by mouth at bedtime      amitriptyline (ELAVIL) 25 MG tablet Take 1 tablet by mouth nightly      DULoxetine (CYMBALTA) 30 MG extended release capsule       butalbital-acetaminophen-caffeine (FIORICET, ESGIC) -40 MG per tablet Take 1 tablet by mouth every 6 hours as needed for Migraine 20 tablet 1    rosuvastatin (CRESTOR) 20 MG tablet TAKE 1 TABLET BY MOUTH EVERY NIGHT 90 tablet 0    ALPRAZolam (XANAX) 0.5 MG tablet TAKE 1/2 TO 1 TABLET BY MOUTH DAILY AS NEEDED FOR ANXIETY      DULoxetine (CYMBALTA) 60 MG extended release capsule Take 1 tablet by mouth daily      metoprolol succinate (TOPROL XL) 100 MG extended release tablet Take 1 tablet by mouth daily      olmesartan-hydroCHLOROthiazide (BENICAR HCT) 40-25 MG per tablet Take 1 tablet by mouth daily      ondansetron (ZOFRAN-ODT) 4 MG disintegrating tablet Take 1 tablet by mouth every 8 hours as needed      Rimegepant Sulfate 75 MG TBDP 1 tablet po every other day  Indications: migraine prevention      tiZANidine (ZANAFLEX) 4 MG tablet TAKE 1 TO 2 TABLETS BY MOUTH AT BEDTIME       No current facility-administered medications on file prior to visit. Cardiovascular Review:  The patient has essential hypertension, hyperlipidemia and Mitral valve prolapse   She is followed by Dr. Adrian Castro who she has an appt with in August.  Of notes she had an ECHO in May 2021 and per cardiology note this was negative for ischemia. She does have strong family history for coronary events. Sister had MI at age 43 and large family history of high cholesterol. She did have a calcium score back in 2019 that was 0.    Current

## 2023-07-25 ENCOUNTER — OFFICE VISIT (OUTPATIENT)
Age: 53
End: 2023-07-25
Payer: COMMERCIAL

## 2023-07-25 VITALS
HEIGHT: 68 IN | RESPIRATION RATE: 18 BRPM | DIASTOLIC BLOOD PRESSURE: 75 MMHG | OXYGEN SATURATION: 96 % | SYSTOLIC BLOOD PRESSURE: 117 MMHG | HEART RATE: 76 BPM | WEIGHT: 217 LBS | BODY MASS INDEX: 32.89 KG/M2 | TEMPERATURE: 98 F

## 2023-07-25 DIAGNOSIS — I10 PRIMARY HYPERTENSION: Primary | ICD-10-CM

## 2023-07-25 DIAGNOSIS — I34.1 MITRAL VALVE PROLAPSE: ICD-10-CM

## 2023-07-25 DIAGNOSIS — Z12.11 COLON CANCER SCREENING: ICD-10-CM

## 2023-07-25 DIAGNOSIS — Z12.31 ENCOUNTER FOR SCREENING MAMMOGRAM FOR MALIGNANT NEOPLASM OF BREAST: ICD-10-CM

## 2023-07-25 DIAGNOSIS — E78.5 HYPERLIPIDEMIA, UNSPECIFIED HYPERLIPIDEMIA TYPE: ICD-10-CM

## 2023-07-25 PROCEDURE — 3078F DIAST BP <80 MM HG: CPT

## 2023-07-25 PROCEDURE — 1036F TOBACCO NON-USER: CPT

## 2023-07-25 PROCEDURE — 3074F SYST BP LT 130 MM HG: CPT

## 2023-07-25 PROCEDURE — G8427 DOCREV CUR MEDS BY ELIG CLIN: HCPCS

## 2023-07-25 PROCEDURE — 99214 OFFICE O/P EST MOD 30 MIN: CPT

## 2023-07-25 PROCEDURE — G8417 CALC BMI ABV UP PARAM F/U: HCPCS

## 2023-07-25 PROCEDURE — 3017F COLORECTAL CA SCREEN DOC REV: CPT

## 2023-07-25 RX ORDER — THIAMINE HCL 100 MG
500 TABLET ORAL NIGHTLY
COMMUNITY

## 2023-07-25 RX ORDER — DULOXETIN HYDROCHLORIDE 30 MG/1
CAPSULE, DELAYED RELEASE ORAL
COMMUNITY
Start: 2023-07-15

## 2023-07-25 RX ORDER — AMITRIPTYLINE HYDROCHLORIDE 25 MG/1
25 TABLET, FILM COATED ORAL NIGHTLY
COMMUNITY
Start: 2023-07-15

## 2023-07-25 SDOH — ECONOMIC STABILITY: FOOD INSECURITY: WITHIN THE PAST 12 MONTHS, THE FOOD YOU BOUGHT JUST DIDN'T LAST AND YOU DIDN'T HAVE MONEY TO GET MORE.: NEVER TRUE

## 2023-07-25 SDOH — ECONOMIC STABILITY: INCOME INSECURITY: HOW HARD IS IT FOR YOU TO PAY FOR THE VERY BASICS LIKE FOOD, HOUSING, MEDICAL CARE, AND HEATING?: NOT HARD AT ALL

## 2023-07-25 SDOH — ECONOMIC STABILITY: FOOD INSECURITY: WITHIN THE PAST 12 MONTHS, YOU WORRIED THAT YOUR FOOD WOULD RUN OUT BEFORE YOU GOT MONEY TO BUY MORE.: NEVER TRUE

## 2023-07-25 SDOH — ECONOMIC STABILITY: HOUSING INSECURITY
IN THE LAST 12 MONTHS, WAS THERE A TIME WHEN YOU DID NOT HAVE A STEADY PLACE TO SLEEP OR SLEPT IN A SHELTER (INCLUDING NOW)?: NO

## 2023-07-25 ASSESSMENT — PATIENT HEALTH QUESTIONNAIRE - PHQ9
SUM OF ALL RESPONSES TO PHQ QUESTIONS 1-9: 6
9. THOUGHTS THAT YOU WOULD BE BETTER OFF DEAD, OR OF HURTING YOURSELF: 0
SUM OF ALL RESPONSES TO PHQ QUESTIONS 1-9: 6
8. MOVING OR SPEAKING SO SLOWLY THAT OTHER PEOPLE COULD HAVE NOTICED. OR THE OPPOSITE, BEING SO FIGETY OR RESTLESS THAT YOU HAVE BEEN MOVING AROUND A LOT MORE THAN USUAL: 0
2. FEELING DOWN, DEPRESSED OR HOPELESS: 1
5. POOR APPETITE OR OVEREATING: 1
3. TROUBLE FALLING OR STAYING ASLEEP: 1
SUM OF ALL RESPONSES TO PHQ QUESTIONS 1-9: 6
6. FEELING BAD ABOUT YOURSELF - OR THAT YOU ARE A FAILURE OR HAVE LET YOURSELF OR YOUR FAMILY DOWN: 1
1. LITTLE INTEREST OR PLEASURE IN DOING THINGS: 1
4. FEELING TIRED OR HAVING LITTLE ENERGY: 1
7. TROUBLE CONCENTRATING ON THINGS, SUCH AS READING THE NEWSPAPER OR WATCHING TELEVISION: 0
SUM OF ALL RESPONSES TO PHQ9 QUESTIONS 1 & 2: 2
10. IF YOU CHECKED OFF ANY PROBLEMS, HOW DIFFICULT HAVE THESE PROBLEMS MADE IT FOR YOU TO DO YOUR WORK, TAKE CARE OF THINGS AT HOME, OR GET ALONG WITH OTHER PEOPLE: 0
SUM OF ALL RESPONSES TO PHQ QUESTIONS 1-9: 6

## 2023-07-25 NOTE — PROGRESS NOTES
Chief Complaint   Patient presents with    Hypertension     3 month follow-up     Hyperlipidemia     3 month follow-up          Health Maintenance Due   Topic Date Due    HIV screen  Never done    Colorectal Cancer Screen  Never done    Breast cancer screen  Never done    Shingles vaccine (1 of 2) Never done    COVID-19 Vaccine (4 - Booster for Moderna series) 01/10/2022           1. \"Have you been to the ER, urgent care clinic since your last visit? Hospitalized since your last visit? \" No    2. \"Have you seen or consulted any other health care providers outside of the [address] [place] [address] since your last visit? \" No     3. For patients aged 43-73: Has the patient had a colonoscopy / FIT/ Cologuard? No       If the patient is female:    4. For patients aged 43-66: Has the patient had a mammogram within the past 2 years? No       5. For patients aged 21-65: Has the patient had a pap smear?   Yes, on file

## 2023-07-29 DIAGNOSIS — I10 PRIMARY HYPERTENSION: Primary | ICD-10-CM

## 2023-07-29 DIAGNOSIS — E78.5 HYPERLIPIDEMIA, UNSPECIFIED HYPERLIPIDEMIA TYPE: ICD-10-CM

## 2023-08-04 RX ORDER — OLMESARTAN MEDOXOMIL AND HYDROCHLOROTHIAZIDE 40/25 40; 25 MG/1; MG/1
1 TABLET ORAL DAILY
Qty: 90 TABLET | Refills: 0 | Status: SHIPPED | OUTPATIENT
Start: 2023-08-04

## 2023-08-04 NOTE — TELEPHONE ENCOUNTER
Refill Request Received for the Following Medication     Requested Prescriptions     Pending Prescriptions Disp Refills    olmesartan-hydroCHLOROthiazide (BENICAR HCT) 40-25 MG per tablet [Pharmacy Med Name: OLMESARTAN MEDOX/HCTZ 40-25MG TAB] 90 tablet      Sig: TAKE 1 TABLET BY MOUTH DAILY       Last Prescribed: 02-    Last Appointment With Me:  6/15/2022     Future Appointments:  Future Appointments   Date Time Provider [address] [place] [address]   8/9/2023  9:30 AM [facility] BS AMB   8/29/2023 10:40 AM MD SHARON Carrillo BS AMB   9/15/2023 11:00 AM VISHNU Mast NP BS AMB

## 2023-08-30 ENCOUNTER — TELEPHONE (OUTPATIENT)
Age: 53
End: 2023-08-30

## 2023-08-30 NOTE — TELEPHONE ENCOUNTER
Spoke with patient. Verified patient with two patient identifiers. Advised we did not give only 8 pills of Nurtec, have not filled since 3-7-2023, and always give # 16 to last one month. Is time for refills, will send to [place]. Patient verbalized understanding.

## 2023-08-30 NOTE — TELEPHONE ENCOUNTER
Pt went to [facility] in [place] and instead of giving her nurtec 16 pills they only gave her 8 pills and she says directions for use have changed for some reason, please call to discuss.

## 2023-09-18 ENCOUNTER — OFFICE VISIT (OUTPATIENT)
Age: 53
End: 2023-09-18
Payer: COMMERCIAL

## 2023-09-18 VITALS
BODY MASS INDEX: 32.71 KG/M2 | RESPIRATION RATE: 16 BRPM | SYSTOLIC BLOOD PRESSURE: 140 MMHG | OXYGEN SATURATION: 96 % | WEIGHT: 215.8 LBS | HEART RATE: 86 BPM | TEMPERATURE: 97.9 F | HEIGHT: 68 IN | DIASTOLIC BLOOD PRESSURE: 90 MMHG

## 2023-09-18 DIAGNOSIS — M54.2 CERVICALGIA: ICD-10-CM

## 2023-09-18 DIAGNOSIS — F32.A ANXIETY AND DEPRESSION: ICD-10-CM

## 2023-09-18 DIAGNOSIS — G43.019 INTRACTABLE MIGRAINE WITHOUT AURA AND WITHOUT STATUS MIGRAINOSUS: Primary | ICD-10-CM

## 2023-09-18 DIAGNOSIS — F41.9 ANXIETY AND DEPRESSION: ICD-10-CM

## 2023-09-18 PROCEDURE — G8417 CALC BMI ABV UP PARAM F/U: HCPCS | Performed by: NURSE PRACTITIONER

## 2023-09-18 PROCEDURE — 1036F TOBACCO NON-USER: CPT | Performed by: NURSE PRACTITIONER

## 2023-09-18 PROCEDURE — 3017F COLORECTAL CA SCREEN DOC REV: CPT | Performed by: NURSE PRACTITIONER

## 2023-09-18 PROCEDURE — 99215 OFFICE O/P EST HI 40 MIN: CPT | Performed by: NURSE PRACTITIONER

## 2023-09-18 PROCEDURE — G8427 DOCREV CUR MEDS BY ELIG CLIN: HCPCS | Performed by: NURSE PRACTITIONER

## 2023-09-18 PROCEDURE — 3077F SYST BP >= 140 MM HG: CPT | Performed by: NURSE PRACTITIONER

## 2023-09-18 PROCEDURE — 3080F DIAST BP >= 90 MM HG: CPT | Performed by: NURSE PRACTITIONER

## 2023-09-18 RX ORDER — OXYCODONE HYDROCHLORIDE 5 MG/1
5 TABLET ORAL EVERY 4 HOURS PRN
COMMUNITY
Start: 2023-08-18 | End: 2023-09-18 | Stop reason: ALTCHOICE

## 2023-09-18 RX ORDER — ONDANSETRON 4 MG/1
4 TABLET, ORALLY DISINTEGRATING ORAL EVERY 8 HOURS PRN
Qty: 30 TABLET | Refills: 1 | Status: SHIPPED | OUTPATIENT
Start: 2023-09-18

## 2023-09-18 RX ORDER — BUTALBITAL, ACETAMINOPHEN AND CAFFEINE 50; 325; 40 MG/1; MG/1; MG/1
1 TABLET ORAL EVERY 6 HOURS PRN
Qty: 20 TABLET | Refills: 1 | Status: SHIPPED | OUTPATIENT
Start: 2023-09-18
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
## 2023-09-18 NOTE — ASSESSMENT & PLAN NOTE
Patient notes good control of her migraines with 1 to 2 weeks on maintenance therapy with Nurtec 75 mg every other day. She is now having chronic stereotypical migrainous headache as she was unable to get her Nurtec filled. 1. New prescription has been placed for Nurtec 75 mg every other day   2. To abort her headache patient was advised to      -She has some samples of Ubrelvy at home which is worked for her in the past.  She can take Saint Yanely as prescribed for abortive therapy      -If Saint Yanely is not successful for her she is advised to use her Medrol dose pack which was previously prescribed for her for abortive therapy. States since taking Nurtec she is required no steroids to abort a headache.      -For her abortive therapy she can also take Fioricet. She is advised to take the max dose in a 24-hour period to abort her headache but to not take Fioricet more than 2 days in a 7-day. To avoid rebound headache. She states this is what she normally takes for abortive therapy. 3. Continue on amitriptyline 25 mg nightly.

## 2023-09-18 NOTE — PATIENT INSTRUCTIONS
It was a pleasure meeting you in clinic today. Due to your issues of refilling your Nurtec, I have placed a new prescription for every other day maintenance therapy. You have Ubrelvy at home go ahead and use that for abortive therapy to get you through until you can obtain your Nurtec. You can also use a Medrol Dosepak as well. Your Zofran and Fioricet has also been renewed. You can use Fioricet for abortive therapy. For severe headache you can take up to the max dose for 2 days in a row. However, do not take Fioricet more than 2 days in a 7-day cycle to minimize risk of rebound headache. We will schedule follow-up with Darnell Zimmer NP in 6 months time. If you have questions or concerns do not hesitate to reach out through [employer] and we can schedule a virtual visit. Office Policies    Phone calls/patient messages:  Please allow up to 24 hours for someone in the office to contact you about your call or message. Be mindful your provider may be out of the office or your message may require further review. We encourage you to use [employer] for your messages as this is a faster, more efficient way to communicate with our office    Medication Refills:  Prescription medications require up to 48 business hours to process. We encourage you to use [employer] for your refills. For controlled medications: Please allow up to 72 business hours to process. Certain medications may require you to  a written prescription at our office. NO narcotic/controlled medications will be prescribed after 4pm Monday through Friday or on weekends    Form/Paperwork Completion:  We ask that you allow 7-14 business days. You may also download your forms to [employer] to have your doctor print off.

## 2023-09-19 RX ORDER — METOPROLOL SUCCINATE 100 MG/1
TABLET, EXTENDED RELEASE ORAL DAILY
Qty: 90 TABLET | Refills: 0 | Status: SHIPPED | OUTPATIENT
Start: 2023-09-19

## 2023-09-19 NOTE — TELEPHONE ENCOUNTER
Requested Prescriptions     Signed Prescriptions Disp Refills    metoprolol succinate (TOPROL XL) 100 MG extended release tablet 90 tablet 0     Sig: TAKE 1 TABLET BY MOUTH DAILY     Authorizing Provider: Josefina Brown     Ordering User: Nicholas Brandt  per MD   Future Appointments   Date Time Provider [address]   11/7/2023 11:20 AM MD SHARON Finn AMB   4/1/2024 10:30 AM VISHNU Taylor NP BS AMB

## 2023-11-10 ENCOUNTER — OFFICE VISIT (OUTPATIENT)
Age: 53
End: 2023-11-10
Payer: COMMERCIAL

## 2023-11-10 VITALS
DIASTOLIC BLOOD PRESSURE: 80 MMHG | HEIGHT: 68 IN | RESPIRATION RATE: 16 BRPM | WEIGHT: 217 LBS | OXYGEN SATURATION: 96 % | SYSTOLIC BLOOD PRESSURE: 120 MMHG | HEART RATE: 61 BPM | BODY MASS INDEX: 32.89 KG/M2

## 2023-11-10 DIAGNOSIS — R00.2 PALPITATIONS: Primary | ICD-10-CM

## 2023-11-10 PROCEDURE — 99214 OFFICE O/P EST MOD 30 MIN: CPT | Performed by: SPECIALIST

## 2023-11-10 PROCEDURE — G8484 FLU IMMUNIZE NO ADMIN: HCPCS | Performed by: SPECIALIST

## 2023-11-10 PROCEDURE — 3079F DIAST BP 80-89 MM HG: CPT | Performed by: SPECIALIST

## 2023-11-10 PROCEDURE — 3074F SYST BP LT 130 MM HG: CPT | Performed by: SPECIALIST

## 2023-11-10 PROCEDURE — 3017F COLORECTAL CA SCREEN DOC REV: CPT | Performed by: SPECIALIST

## 2023-11-10 PROCEDURE — G8417 CALC BMI ABV UP PARAM F/U: HCPCS | Performed by: SPECIALIST

## 2023-11-10 PROCEDURE — G8427 DOCREV CUR MEDS BY ELIG CLIN: HCPCS | Performed by: SPECIALIST

## 2023-11-10 PROCEDURE — 93000 ELECTROCARDIOGRAM COMPLETE: CPT | Performed by: SPECIALIST

## 2023-11-10 PROCEDURE — 1036F TOBACCO NON-USER: CPT | Performed by: SPECIALIST

## 2023-11-10 ASSESSMENT — PATIENT HEALTH QUESTIONNAIRE - PHQ9
5. POOR APPETITE OR OVEREATING: 0
SUM OF ALL RESPONSES TO PHQ QUESTIONS 1-9: 0
2. FEELING DOWN, DEPRESSED OR HOPELESS: 0
9. THOUGHTS THAT YOU WOULD BE BETTER OFF DEAD, OR OF HURTING YOURSELF: 0
8. MOVING OR SPEAKING SO SLOWLY THAT OTHER PEOPLE COULD HAVE NOTICED. OR THE OPPOSITE, BEING SO FIGETY OR RESTLESS THAT YOU HAVE BEEN MOVING AROUND A LOT MORE THAN USUAL: 0
SUM OF ALL RESPONSES TO PHQ9 QUESTIONS 1 & 2: 0
4. FEELING TIRED OR HAVING LITTLE ENERGY: 0
7. TROUBLE CONCENTRATING ON THINGS, SUCH AS READING THE NEWSPAPER OR WATCHING TELEVISION: 0
6. FEELING BAD ABOUT YOURSELF - OR THAT YOU ARE A FAILURE OR HAVE LET YOURSELF OR YOUR FAMILY DOWN: 0
SUM OF ALL RESPONSES TO PHQ QUESTIONS 1-9: 0
3. TROUBLE FALLING OR STAYING ASLEEP: 0
1. LITTLE INTEREST OR PLEASURE IN DOING THINGS: 0
10. IF YOU CHECKED OFF ANY PROBLEMS, HOW DIFFICULT HAVE THESE PROBLEMS MADE IT FOR YOU TO DO YOUR WORK, TAKE CARE OF THINGS AT HOME, OR GET ALONG WITH OTHER PEOPLE: 0
SUM OF ALL RESPONSES TO PHQ QUESTIONS 1-9: 0
SUM OF ALL RESPONSES TO PHQ QUESTIONS 1-9: 0

## 2023-11-10 ASSESSMENT — COLUMBIA-SUICIDE SEVERITY RATING SCALE - C-SSRS
3. HAVE YOU BEEN THINKING ABOUT HOW YOU MIGHT KILL YOURSELF?: NO
5. HAVE YOU STARTED TO WORK OUT OR WORKED OUT THE DETAILS OF HOW TO KILL YOURSELF? DO YOU INTEND TO CARRY OUT THIS PLAN?: NO
4. HAVE YOU HAD THESE THOUGHTS AND HAD SOME INTENTION OF ACTING ON THEM?: NO
7. DID THIS OCCUR IN THE LAST THREE MONTHS: NO

## 2023-11-10 NOTE — PROGRESS NOTES
visit (from the past 4464 hour(s)). IMAGING    MRI Result (most recent):  MRI BRAIN W WO CONTRAST 10/14/2021    Narrative  EXAM:  MRI BRAIN W WO CONT  INDICATION:  Intractable migraine, headache disorder, dizziness, migraine with  vertigo, \"recent onset of ice pick migraines daily\". TECHNIQUE:  Sagittal T1, axial FLAIR, T2, T1 and gradient echo T2-weighted images of the  head were obtained followed by intravenous infusion 18 mL ProHance repeat axial  and coronal T1-weighted images and axial diffusion weighted images. COMPARISON: None available. FINDINGS:  The ventricular size and configuration are normal.  Normal signal demonstrated in the cerebral hemispheres, brain stem and  cerebellum. No abnormal areas of intracranial enhancement. No abnormal diffusion. No evidence of intracranial hemorrhage, infarct, mass or abnormal extra-axial  fluid collections. Flow voids are present in the vertebral, basilar and carotid artery systems. Slight low-lying cerebellar tonsils are still within the range of normal. The  craniocervical junction is otherwise unremarkable. The structures of the cranial base including paranasal sinuses are  unremarkable. Impression  Normal MRI of the head. CT Result (most recent):  CT ABDOMEN PELVIS W IV CONTRAST 10/19/2021    Narrative  EXAM:  CT abdomen pelvis with contrast    INDICATION: Right lower abdomen pain. COMPARISON: None. TECHNIQUE: Helical CT of the abdomen  and pelvis  following the uneventful  intravenous administration of nonionic contrast.  Coronal and sagittal reformats  are performed. CT dose reduction was achieved through use of a standardized  protocol tailored for this examination and automatic exposure control for dose  modulation. FINDINGS:  The visualized lung bases demonstrate no mass or consolidation. The heart size  is normal. There is no pericardial or pleural effusion.     The liver is diffusely low in attenuation in keeping with

## 2023-11-13 DIAGNOSIS — G43.019 INTRACTABLE MIGRAINE WITHOUT AURA AND WITHOUT STATUS MIGRAINOSUS: ICD-10-CM

## 2023-11-14 NOTE — TELEPHONE ENCOUNTER
Patient called ?  Refill medication for Olmesartan to [facility]# [phone] patient taken last pill today would like callback    Patient# [phone]

## 2023-11-15 ENCOUNTER — TELEPHONE (OUTPATIENT)
Age: 53
End: 2023-11-15

## 2023-11-15 RX ORDER — OLMESARTAN MEDOXOMIL AND HYDROCHLOROTHIAZIDE 40/25 40; 25 MG/1; MG/1
1 TABLET ORAL DAILY
Qty: 90 TABLET | Refills: 3 | Status: SHIPPED | OUTPATIENT
Start: 2023-11-15

## 2023-11-15 RX ORDER — ONDANSETRON 4 MG/1
4 TABLET, ORALLY DISINTEGRATING ORAL EVERY 8 HOURS PRN
Qty: 30 TABLET | Refills: 1 | Status: SHIPPED | OUTPATIENT
Start: 2023-11-15

## 2023-11-15 NOTE — TELEPHONE ENCOUNTER
Requested Prescriptions     Signed Prescriptions Disp Refills    olmesartan-hydroCHLOROthiazide (BENICAR HCT) 40-25 MG per tablet 90 tablet 3     Sig: TAKE 1 TABLET BY MOUTH DAILY     Authorizing Provider: Marito Elena     Ordering User: Citlaly Eric     Verbal order per Dr. Kim Escobar.     Future Appointments   Date Time Provider [address]   5/20/2024 11:30 AM Ronaldo Hameed APRN - NP [facility] BS AMB   11/11/2024 11:20 AM MD SHARON Wade BS AMB

## 2023-11-29 DIAGNOSIS — G43.019 INTRACTABLE MIGRAINE WITHOUT AURA AND WITHOUT STATUS MIGRAINOSUS: ICD-10-CM

## 2023-11-30 RX ORDER — BUTALBITAL, ACETAMINOPHEN AND CAFFEINE 50; 325; 40 MG/1; MG/1; MG/1
1 TABLET ORAL EVERY 6 HOURS PRN
Qty: 20 TABLET | Refills: 1 | Status: SHIPPED | OUTPATIENT
Start: 2023-11-30

## 2023-11-30 NOTE — TELEPHONE ENCOUNTER
Last office visit 09/18/2023 w/ Tia Awkward  Next office visit 05/20/2024 w/ Tia Awkward  Last med refill 07/17/2023    Rx discontinued 9/18/23

## 2024-01-19 ENCOUNTER — TELEPHONE (OUTPATIENT)
Age: 54
End: 2024-01-19

## 2024-01-19 NOTE — TELEPHONE ENCOUNTER
Left voicemail for patient to call back to make mammogram appointment   Call Me back at [phone]  My name is Johanny   Or  call Central Scheduling [phone]

## 2024-01-19 NOTE — TELEPHONE ENCOUNTER
Pt called stating she is currently out of her medication   Rimegepant Sulfate 75 MG TBDP but due to an insurance change the co pay on that medication will be $900, same with Ubrelvy. Pt stated she wants to know if there is any assistance out there because this medication does work well for her.     Please call:[phone]

## 2024-01-22 NOTE — TELEPHONE ENCOUNTER
Left message on Identified VM to call office at [phone]  Did advise I would send her the nurtec savings card. Also advised she would need a PA done for future refills. Advised that she would likely need to try and fail 2 \" triptans\" in order for the PA to be approved. Will await a call back from patient. Need to know if she has tried and failed triptans.

## 2024-02-22 DIAGNOSIS — G43.019 INTRACTABLE MIGRAINE WITHOUT AURA AND WITHOUT STATUS MIGRAINOSUS: ICD-10-CM

## 2024-02-23 RX ORDER — BUTALBITAL, ACETAMINOPHEN AND CAFFEINE 50; 325; 40 MG/1; MG/1; MG/1
1 TABLET ORAL EVERY 6 HOURS PRN
Qty: 20 TABLET | Refills: 1 | Status: SHIPPED | OUTPATIENT
Start: 2024-02-23

## 2024-03-22 RX ORDER — METOPROLOL SUCCINATE 100 MG/1
TABLET, EXTENDED RELEASE ORAL DAILY
Qty: 90 TABLET | Refills: 3 | Status: SHIPPED | OUTPATIENT
Start: 2024-03-22

## 2024-05-09 DIAGNOSIS — G43.019 INTRACTABLE MIGRAINE WITHOUT AURA AND WITHOUT STATUS MIGRAINOSUS: ICD-10-CM

## 2024-05-10 RX ORDER — BUTALBITAL, ACETAMINOPHEN AND CAFFEINE 50; 325; 40 MG/1; MG/1; MG/1
1 TABLET ORAL EVERY 6 HOURS PRN
Qty: 20 TABLET | Refills: 1 | Status: SHIPPED | OUTPATIENT
Start: 2024-05-10

## 2024-05-16 ENCOUNTER — OFFICE VISIT (OUTPATIENT)
Age: 54
End: 2024-05-16
Payer: MEDICARE

## 2024-05-16 VITALS
WEIGHT: 288.4 LBS | SYSTOLIC BLOOD PRESSURE: 135 MMHG | DIASTOLIC BLOOD PRESSURE: 80 MMHG | BODY MASS INDEX: 45.27 KG/M2 | OXYGEN SATURATION: 98 % | RESPIRATION RATE: 18 BRPM | TEMPERATURE: 98.2 F | HEIGHT: 67 IN | HEART RATE: 68 BPM

## 2024-05-16 DIAGNOSIS — M54.41 ACUTE RIGHT-SIDED LOW BACK PAIN WITH RIGHT-SIDED SCIATICA: Primary | ICD-10-CM

## 2024-05-16 PROCEDURE — 99213 OFFICE O/P EST LOW 20 MIN: CPT | Performed by: PHYSICIAN ASSISTANT

## 2024-05-16 PROCEDURE — G8427 DOCREV CUR MEDS BY ELIG CLIN: HCPCS | Performed by: PHYSICIAN ASSISTANT

## 2024-05-16 PROCEDURE — 1036F TOBACCO NON-USER: CPT | Performed by: PHYSICIAN ASSISTANT

## 2024-05-16 PROCEDURE — 3079F DIAST BP 80-89 MM HG: CPT | Performed by: PHYSICIAN ASSISTANT

## 2024-05-16 PROCEDURE — 3017F COLORECTAL CA SCREEN DOC REV: CPT | Performed by: PHYSICIAN ASSISTANT

## 2024-05-16 PROCEDURE — 3075F SYST BP GE 130 - 139MM HG: CPT | Performed by: PHYSICIAN ASSISTANT

## 2024-05-16 PROCEDURE — G8417 CALC BMI ABV UP PARAM F/U: HCPCS | Performed by: PHYSICIAN ASSISTANT

## 2024-05-16 RX ORDER — TIZANIDINE 4 MG/1
4 TABLET ORAL 3 TIMES DAILY PRN
Qty: 30 TABLET | Refills: 0 | Status: SHIPPED | OUTPATIENT
Start: 2024-05-16

## 2024-05-16 RX ORDER — METHYLPREDNISOLONE 4 MG/1
TABLET ORAL
Qty: 1 KIT | Refills: 0 | Status: SHIPPED | OUTPATIENT
Start: 2024-05-16 | End: 2024-05-22

## 2024-05-16 ASSESSMENT — PATIENT HEALTH QUESTIONNAIRE - PHQ9
1. LITTLE INTEREST OR PLEASURE IN DOING THINGS: NOT AT ALL
10. IF YOU CHECKED OFF ANY PROBLEMS, HOW DIFFICULT HAVE THESE PROBLEMS MADE IT FOR YOU TO DO YOUR WORK, TAKE CARE OF THINGS AT HOME, OR GET ALONG WITH OTHER PEOPLE: NOT DIFFICULT AT ALL
SUM OF ALL RESPONSES TO PHQ QUESTIONS 1-9: 0
SUM OF ALL RESPONSES TO PHQ QUESTIONS 1-9: 0
SUM OF ALL RESPONSES TO PHQ9 QUESTIONS 1 & 2: 0
5. POOR APPETITE OR OVEREATING: NOT AT ALL
SUM OF ALL RESPONSES TO PHQ QUESTIONS 1-9: 0
9. THOUGHTS THAT YOU WOULD BE BETTER OFF DEAD, OR OF HURTING YOURSELF: NOT AT ALL
3. TROUBLE FALLING OR STAYING ASLEEP: NOT AT ALL
8. MOVING OR SPEAKING SO SLOWLY THAT OTHER PEOPLE COULD HAVE NOTICED. OR THE OPPOSITE, BEING SO FIGETY OR RESTLESS THAT YOU HAVE BEEN MOVING AROUND A LOT MORE THAN USUAL: NOT AT ALL
2. FEELING DOWN, DEPRESSED OR HOPELESS: NOT AT ALL
SUM OF ALL RESPONSES TO PHQ QUESTIONS 1-9: 0
4. FEELING TIRED OR HAVING LITTLE ENERGY: NOT AT ALL
7. TROUBLE CONCENTRATING ON THINGS, SUCH AS READING THE NEWSPAPER OR WATCHING TELEVISION: NOT AT ALL
6. FEELING BAD ABOUT YOURSELF - OR THAT YOU ARE A FAILURE OR HAVE LET YOURSELF OR YOUR FAMILY DOWN: NOT AT ALL

## 2024-05-16 NOTE — PROGRESS NOTES
Chief Complaint   Patient presents with    Back Pain         Health Maintenance Due   Topic Date Due    Hepatitis B vaccine (1 of 3 - 3-dose series) Never done    HIV screen  Never done    Breast cancer screen  Never done    Shingles vaccine (1 of 2) Never done    COVID-19 Vaccine (4 - 2023-24 season) 09/01/2023    Colorectal Cancer Screen  09/14/2023    Lipids  11/29/2023         \"Have you been to the ER, urgent care clinic since your last visit?  Hospitalized since your last visit?\"    NO    “Have you seen or consulted any other health care providers outside of [facility] since your last visit?”    Cardiology,Neurology    “Have you had a colorectal cancer screening such as a colonoscopy/FIT/Cologuard?    NO    No colonoscopy on file  Date of last Cologuard: 9/14/2020  No FIT/FOBT on file   No flexible sigmoidoscopy on file        Have you had a mammogram?”   NO    No breast cancer screening on file

## 2024-05-16 NOTE — PROGRESS NOTES
Subjective:   Sunita Cameron is a 54 y.o. female who complains of right low back pain since 4/20/24  She was getting one of her dogs (very large wolfhounds) in the car and felt her back strain.  She was taking Tylenol and ibuprofen, icing it  Over the last week, it has increased, burns on the right lower back, and is worse when she twists or turns  She has a difficult to describe pain that shoots down her right leg when she bends over  No tingling or numbness  No loss of strength  No bowel or bladder dysfunction  Trying to stay active with yoga and gentle stretches        Past Medical History:   Diagnosis Date    Anxiety and depression     HTN (hypertension)     Migraines      Social History     Tobacco Use    Smoking status: Never    Smokeless tobacco: Never   Substance Use Topics    Alcohol use: Yes    Drug use: Never       Allergies   Allergen Reactions    Latex Hives, Itching and Other (See Comments)     Swelling at contact areas    Mangifera Indica Swelling    Sulfamethoxazole-Trimethoprim Hives        Review of Systems  A comprehensive review of systems was negative except for that written in the HPI.    Objective:     /80 (Site: Right Upper Arm, Position: Sitting, Cuff Size: Large Adult)   Pulse 68   Temp 98.2 °F (36.8 °C) (Temporal)   Resp 18   Ht 1.702 m (5' 7\")   Wt 130.8 kg (288 lb 6.4 oz)   SpO2 98%   BMI 45.17 kg/m²   General:   alert, cooperative   Eyes: conjunctivae/scleras clear. PERRL, EOM's intact   Mouth:  Mucous membranes moist   Neck: Supple, trachea midline   CV: DP pulses 2+/4 bilaterally   Lungs: no increased work of breathing   Abdomen: No CVAT   Extremities: No edema or cyanosis   Back: (+)tender right paraspinal muscles L5  Pain on trunk flexion to 60 degrees  SLR negative bilaterally     Neuro: Normal strength and movement in B lower extremities. Sensation intact and symmetric.       Assessment/Plan:       ICD-10-CM    1. Acute right-sided low back pain with right-sided

## 2024-05-20 ENCOUNTER — OFFICE VISIT (OUTPATIENT)
Age: 54
End: 2024-05-20
Payer: MEDICARE

## 2024-05-20 VITALS
DIASTOLIC BLOOD PRESSURE: 68 MMHG | SYSTOLIC BLOOD PRESSURE: 100 MMHG | RESPIRATION RATE: 18 BRPM | HEART RATE: 79 BPM | OXYGEN SATURATION: 97 % | BODY MASS INDEX: 35.91 KG/M2 | WEIGHT: 228.8 LBS | HEIGHT: 67 IN

## 2024-05-20 DIAGNOSIS — G43.019 INTRACTABLE MIGRAINE WITHOUT AURA AND WITHOUT STATUS MIGRAINOSUS: Primary | ICD-10-CM

## 2024-05-20 DIAGNOSIS — G47.9 SLEEP DISORDER: ICD-10-CM

## 2024-05-20 PROCEDURE — 3078F DIAST BP <80 MM HG: CPT | Performed by: NURSE PRACTITIONER

## 2024-05-20 PROCEDURE — G8417 CALC BMI ABV UP PARAM F/U: HCPCS | Performed by: NURSE PRACTITIONER

## 2024-05-20 PROCEDURE — 99213 OFFICE O/P EST LOW 20 MIN: CPT | Performed by: NURSE PRACTITIONER

## 2024-05-20 PROCEDURE — 3017F COLORECTAL CA SCREEN DOC REV: CPT | Performed by: NURSE PRACTITIONER

## 2024-05-20 PROCEDURE — 1036F TOBACCO NON-USER: CPT | Performed by: NURSE PRACTITIONER

## 2024-05-20 PROCEDURE — 3074F SYST BP LT 130 MM HG: CPT | Performed by: NURSE PRACTITIONER

## 2024-05-20 PROCEDURE — G8427 DOCREV CUR MEDS BY ELIG CLIN: HCPCS | Performed by: NURSE PRACTITIONER

## 2024-05-20 ASSESSMENT — PATIENT HEALTH QUESTIONNAIRE - PHQ9
SUM OF ALL RESPONSES TO PHQ QUESTIONS 1-9: 0
SUM OF ALL RESPONSES TO PHQ QUESTIONS 1-9: 0
SUM OF ALL RESPONSES TO PHQ9 QUESTIONS 1 & 2: 0
1. LITTLE INTEREST OR PLEASURE IN DOING THINGS: NOT AT ALL
SUM OF ALL RESPONSES TO PHQ QUESTIONS 1-9: 0
SUM OF ALL RESPONSES TO PHQ QUESTIONS 1-9: 0
2. FEELING DOWN, DEPRESSED OR HOPELESS: NOT AT ALL

## 2024-05-20 NOTE — PROGRESS NOTES
Date:  24    Name:  OJ ANAYA  :  1970  MRN:  [id number]       Chief Complaint   Patient presents with    Migraine     Follow up - not doing bad - taking nurtec every other day - pharmacy keeps running out so she will get the migraines then          HISTORY OF PRESENT ILLNESS:   Patient presents today for regular follow up for migraine headaches.    Headache Characteristics:  throbbing pain, can be ice pick pains at times.  Location of the headache:  right side of head and neck  Frequency: 15+ a month but less intense, she is able to function with them.  Length of headache/migraine: an hour or two but can last days.  Aura: at times  Nausea/Vomiting:  yes  Photophobia: yes  Phonophobia:  yes  Provoking factors: stress, weather changes, extreme heat, interrupted sleep, certain foods and drinks.  Relieving factors: Nurtec every other day (they don't last as long). Excedrin migraine, Fioricet, cold packs, laying down.      Meds:  Current abortive tx: Fioricet, Tizandine, Zofran,   Current preventative tx: Nurtec. Elavil      Social:  Work: on disability.  Drinks plenty of water, knows dietary triggers.  Tobacco use: no.  Sleep habits: better with meds.  Exercise: does some walking, yoga.      Recap from last visit:  1. Intractable migraine without aura and without status migrainosus  Assessment & Plan:   Patient notes good control of her migraines with 1 to 2 weeks on maintenance therapy with Nurtec 75 mg every other day.  She is now having chronic stereotypical migrainous headache as she was unable to get her Nurtec filled.   1. New prescription has been placed for Nurtec 75 mg every other day   2. To abort her headache patient was advised to      -She has some samples of Ubrelvy at home which is worked for her in the past.  She can take Ubrelvy as prescribed for abortive therapy      -If Ubrelvy is not successful for her she is advised to use her Medrol dose pack which was previously prescribed for her

## 2024-05-20 NOTE — PROGRESS NOTES
Chief Complaint   Patient presents with    Migraine     Follow up - not doing bad - taking nurtec every other day - pharmacy keeps running out so she will get the migraines then      1. Have you been to the ER, urgent care clinic since your last visit?  Hospitalized since your last visit? No   2. Have you seen or consulted any other health care providers outside of the [facility] since your last visit?  Include any pap smears or colon screening.  No

## 2024-05-21 ENCOUNTER — TELEPHONE (OUTPATIENT)
Age: 54
End: 2024-05-21

## 2024-05-21 NOTE — TELEPHONE ENCOUNTER
RE:Nurtec 75 mg tablet prior authorization sent to [employer] through [facility]    Key: [id number]     PA Case ID: [id number]    Determination received     Additional Information Required    This medication or product was previously approved on [id number] from 2024-01-17 to 2024-12-31.   **Please note: This request was submitted electronically. Formulary lowering, tiering exception, cost reduction and/or pre-benefit determination review (including prospective Medicare hospice reviews) requests cannot be requested using this method of submission.     Providers contact us at [phone] for further assistance    FYI to nurse

## 2024-05-25 DIAGNOSIS — M54.41 ACUTE RIGHT-SIDED LOW BACK PAIN WITH RIGHT-SIDED SCIATICA: ICD-10-CM

## 2024-05-29 RX ORDER — TIZANIDINE 4 MG/1
TABLET ORAL
Qty: 30 TABLET | Refills: 0 | Status: SHIPPED | OUTPATIENT
Start: 2024-05-29

## 2024-06-07 DIAGNOSIS — M54.41 ACUTE RIGHT-SIDED LOW BACK PAIN WITH RIGHT-SIDED SCIATICA: ICD-10-CM

## 2024-06-07 RX ORDER — TIZANIDINE 4 MG/1
TABLET ORAL
Qty: 30 TABLET | Refills: 0 | Status: SHIPPED | OUTPATIENT
Start: 2024-06-07

## 2024-06-14 DIAGNOSIS — M54.41 ACUTE RIGHT-SIDED LOW BACK PAIN WITH RIGHT-SIDED SCIATICA: ICD-10-CM

## 2024-06-14 RX ORDER — TIZANIDINE 4 MG/1
TABLET ORAL
Qty: 30 TABLET | Refills: 0 | Status: SHIPPED | OUTPATIENT
Start: 2024-06-14

## 2024-08-02 DIAGNOSIS — M54.41 ACUTE RIGHT-SIDED LOW BACK PAIN WITH RIGHT-SIDED SCIATICA: ICD-10-CM

## 2024-08-02 RX ORDER — TIZANIDINE 4 MG/1
TABLET ORAL
Qty: 30 TABLET | Refills: 0 | Status: SHIPPED | OUTPATIENT
Start: 2024-08-02

## 2024-08-10 DIAGNOSIS — G43.019 INTRACTABLE MIGRAINE WITHOUT AURA AND WITHOUT STATUS MIGRAINOSUS: ICD-10-CM

## 2024-08-12 DIAGNOSIS — M54.41 ACUTE RIGHT-SIDED LOW BACK PAIN WITH RIGHT-SIDED SCIATICA: ICD-10-CM

## 2024-08-12 RX ORDER — TIZANIDINE 4 MG/1
TABLET ORAL
Qty: 30 TABLET | Refills: 0 | Status: SHIPPED | OUTPATIENT
Start: 2024-08-12

## 2024-08-12 RX ORDER — BUTALBITAL, ACETAMINOPHEN AND CAFFEINE 50; 325; 40 MG/1; MG/1; MG/1
1 TABLET ORAL EVERY 6 HOURS PRN
Qty: 20 TABLET | Refills: 1 | Status: SHIPPED | OUTPATIENT
Start: 2024-08-12

## 2024-08-12 RX ORDER — BUTALBITAL, ACETAMINOPHEN AND CAFFEINE 50; 325; 40 MG/1; MG/1; MG/1
1 TABLET ORAL EVERY 6 HOURS PRN
Qty: 20 TABLET | Refills: 1 | OUTPATIENT
Start: 2024-08-12

## 2024-08-19 DIAGNOSIS — M54.41 ACUTE RIGHT-SIDED LOW BACK PAIN WITH RIGHT-SIDED SCIATICA: ICD-10-CM

## 2024-08-19 RX ORDER — TIZANIDINE 4 MG/1
TABLET ORAL
Qty: 30 TABLET | Refills: 0 | Status: SHIPPED | OUTPATIENT
Start: 2024-08-19

## 2024-08-25 DIAGNOSIS — M54.41 ACUTE RIGHT-SIDED LOW BACK PAIN WITH RIGHT-SIDED SCIATICA: ICD-10-CM

## 2024-10-31 RX ORDER — OLMESARTAN MEDOXOMIL AND HYDROCHLOROTHIAZIDE 40/25 40; 25 MG/1; MG/1
1 TABLET ORAL DAILY
Qty: 90 TABLET | Refills: 1 | Status: SHIPPED | OUTPATIENT
Start: 2024-10-31

## 2024-10-31 NOTE — TELEPHONE ENCOUNTER
Requested Prescriptions     Signed Prescriptions Disp Refills    olmesartan-hydroCHLOROthiazide (BENICAR HCT) 40-25 MG per tablet 90 tablet 1     Sig: TAKE 1 TABLET BY MOUTH DAILY     Authorizing Provider: FLO DAI     Ordering User: GABRIELLE DIEHL     Verbal order per Dr. Dai.    Future Appointments   Date Time Provider Department Center   11/11/2024 11:20 AM Flo Dai MD [facility] BS AMB   1/20/2025 11:30 AM Idalia Hameed APRN - [facility] BS AMB

## 2024-11-03 DIAGNOSIS — G43.019 INTRACTABLE MIGRAINE WITHOUT AURA AND WITHOUT STATUS MIGRAINOSUS: ICD-10-CM

## 2024-11-04 RX ORDER — BUTALBITAL, ACETAMINOPHEN AND CAFFEINE 50; 325; 40 MG/1; MG/1; MG/1
1 TABLET ORAL EVERY 6 HOURS PRN
Qty: 20 TABLET | Refills: 1 | Status: SHIPPED | OUTPATIENT
Start: 2024-11-04

## 2025-02-12 NOTE — ANESTHESIA PREPROCEDURE EVALUATION
Relevant Problems   No relevant active problems       Anesthetic History   No history of anesthetic complications            Review of Systems / Medical History  Patient summary reviewed, nursing notes reviewed and pertinent labs reviewed    Pulmonary  Within defined limits                 Neuro/Psych   Within defined limits           Cardiovascular  Within defined limits  Hypertension                   GI/Hepatic/Renal  Within defined limits              Endo/Other  Within defined limits      Obesity     Other Findings              Physical Exam    Airway  Mallampati: II  TM Distance: > 6 cm  Neck ROM: normal range of motion   Mouth opening: Normal     Cardiovascular  Regular rate and rhythm,  S1 and S2 normal,  no murmur, click, rub, or gallop             Dental  No notable dental hx       Pulmonary  Breath sounds clear to auscultation               Abdominal  GI exam deferred       Other Findings            Anesthetic Plan    ASA: 2  Anesthesia type: general          Induction: Intravenous  Anesthetic plan and risks discussed with: Patient Vincent Foss patient (colorectal surgery)     Order for EGD placed and wants done at [place].   Please review and advise on department, any meds to hold.

## (undated) DEVICE — SUTURE VCRL SZ 2-0 L27IN ABSRB UD L26MM SH 1/2 CIR J417H

## (undated) DEVICE — TROCAR SITE CLOSURE DEVICE: Brand: ENDO CLOSE

## (undated) DEVICE — GLOVE ORANGE PI 7 1/2   MSG9075

## (undated) DEVICE — GARMENT,MEDLINE,DVT,INT,CALF,MED, GEN2: Brand: MEDLINE

## (undated) DEVICE — TOTAL TRAY, DB, 100% SILI FOLEY, 16FR 10: Brand: MEDLINE

## (undated) DEVICE — RESERVOIR,SUCTION,100CC,SILICONE: Brand: MEDLINE

## (undated) DEVICE — LAPAROSCOPIC TROCAR SLEEVE/SINGLE USE: Brand: KII® OPTICAL ACCESS SYSTEM

## (undated) DEVICE — SUTURE SZ 0 27IN 5/8 CIR UR-6  TAPER PT VIOLET ABSRB VICRYL J603H

## (undated) DEVICE — GENERAL LAPAROSCOPY - SMH: Brand: MEDLINE INDUSTRIES, INC.

## (undated) DEVICE — CLICKLINE SCISSORS INSERT: Brand: CLICKLINE

## (undated) DEVICE — 40580 - THE PINK PAD - ADVANCED TRENDELENBURG POSITIONING KIT: Brand: 40580 - THE PINK PAD - ADVANCED TRENDELENBURG POSITIONING KIT

## (undated) DEVICE — DISSECTOR ULTRASONIC L39CM CRV JAW CRDLSS SONICISION

## (undated) DEVICE — Device

## (undated) DEVICE — TROCAR: Brand: KII® SLEEVE

## (undated) DEVICE — GLOVE SURG SZ 8 L12IN FNGR THK79MIL GRN LTX FREE

## (undated) DEVICE — DRAIN SURG 19FR 100% SIL RADPQ RND CHN FULL FLUT

## (undated) DEVICE — BAG SPEC REM 224ML W4XL6IN DIA10MM 1 HND GYN DISP ENDOPCH

## (undated) DEVICE — DERMABOND SKIN ADH 0.7ML -- DERMABOND ADVANCED 12/BX

## (undated) DEVICE — 4-PORT MANIFOLD: Brand: NEPTUNE 2

## (undated) DEVICE — SUTURE MCRYL SZ 4-0 L27IN ABSRB UD L19MM PS-2 1/2 CIR PRIM Y426H

## (undated) DEVICE — POWER SHELL: Brand: SIGNIA

## (undated) DEVICE — TROCAR: Brand: KII® OPTICAL ACCESS SYSTEM

## (undated) DEVICE — NEEDLE HYPO 22GA L1.5IN BLK S STL HUB POLYPR SHLD REG BVL

## (undated) DEVICE — INTELLIGENT RELOAD: Brand: TRI-STAPLE 2.0

## (undated) DEVICE — INSUFFLATION NEEDLE TO ESTABLISH PNEUMOPERITONEUM.: Brand: INSUFFLATION NEEDLE